# Patient Record
Sex: FEMALE | Race: WHITE | NOT HISPANIC OR LATINO | Employment: UNEMPLOYED | URBAN - METROPOLITAN AREA
[De-identification: names, ages, dates, MRNs, and addresses within clinical notes are randomized per-mention and may not be internally consistent; named-entity substitution may affect disease eponyms.]

---

## 2017-01-11 ENCOUNTER — HOSPITAL ENCOUNTER (OUTPATIENT)
Dept: ONCOLOGY | Facility: CLINIC | Age: 52
Discharge: HOME | End: 2017-01-11
Attending: INTERNAL MEDICINE | Admitting: INTERNAL MEDICINE

## 2017-04-12 ENCOUNTER — HOSPITAL ENCOUNTER (OUTPATIENT)
Dept: ONCOLOGY | Facility: CLINIC | Age: 52
Discharge: HOME | End: 2017-04-12
Attending: INTERNAL MEDICINE | Admitting: INTERNAL MEDICINE

## 2017-04-12 LAB
ALBUMIN SERPL-MCNC: 4.1 G/DL (ref 3.5–4.8)
ALBUMIN/GLOB SERPL: 1.4 {RATIO} (ref 1–1.7)
ALP SERPL-CCNC: 51 IU/L (ref 32–91)
ALT SERPL-CCNC: 17 IU/L (ref 14–54)
ANION GAP SERPL CALC-SCNC: 13.8 MMOL/L (ref 10–20)
AST SERPL-CCNC: 20 IU/L (ref 15–41)
BILIRUB SERPL-MCNC: 0.3 MG/DL (ref 0.3–1.2)
BUN SERPL-MCNC: 11 MG/DL (ref 8–20)
BUN/CREAT SERPL: 15.7 (ref 5.4–26.2)
CALCIUM SERPL-MCNC: 9.7 MG/DL (ref 8.9–10.3)
CHLORIDE SERPL-SCNC: 105 MMOL/L (ref 101–111)
CONV CO2: 25 MMOL/L (ref 22–32)
CONV TOTAL PROTEIN: 7 G/DL (ref 6.1–7.9)
CREAT UR-MCNC: 0.7 MG/DL (ref 0.4–1)
GLOBULIN UR ELPH-MCNC: 2.9 G/DL (ref 2.5–3.8)
GLUCOSE SERPL-MCNC: 94 MG/DL (ref 65–99)
POTASSIUM SERPL-SCNC: 3.8 MMOL/L (ref 3.6–5.1)
SODIUM SERPL-SCNC: 140 MMOL/L (ref 136–144)

## 2017-06-16 ENCOUNTER — HOSPITAL ENCOUNTER (OUTPATIENT)
Dept: MAMMOGRAPHY | Facility: HOSPITAL | Age: 52
Discharge: HOME | End: 2017-06-16
Attending: INTERNAL MEDICINE | Admitting: INTERNAL MEDICINE

## 2017-08-23 ENCOUNTER — HOSPITAL ENCOUNTER (OUTPATIENT)
Dept: TELEMETRY | Facility: HOSPITAL | Age: 52
Discharge: HOME | End: 2017-08-23
Attending: INTERNAL MEDICINE | Admitting: INTERNAL MEDICINE

## 2017-08-23 LAB
ALBUMIN SERPL-MCNC: 4.4 G/DL (ref 3.5–4.8)
ALBUMIN/GLOB SERPL: 1.3 {RATIO} (ref 1–1.7)
ALP SERPL-CCNC: 50 IU/L (ref 32–91)
ALT SERPL-CCNC: 16 IU/L (ref 14–54)
ANION GAP SERPL CALC-SCNC: 13.2 MMOL/L (ref 10–20)
AST SERPL-CCNC: 20 IU/L (ref 15–41)
BILIRUB SERPL-MCNC: 0.4 MG/DL (ref 0.3–1.2)
BUN SERPL-MCNC: 16 MG/DL (ref 8–20)
BUN/CREAT SERPL: 22.9 (ref 5.4–26.2)
CALCIUM SERPL-MCNC: 10.3 MG/DL (ref 8.9–10.3)
CHLORIDE SERPL-SCNC: 105 MMOL/L (ref 101–111)
CONV CO2: 27 MMOL/L (ref 22–32)
CONV TOTAL PROTEIN: 7.7 G/DL (ref 6.1–7.9)
CREAT UR-MCNC: 0.7 MG/DL (ref 0.4–1)
FSH SERPL-ACNC: NORMAL MIU/ML
GLOBULIN UR ELPH-MCNC: 3.3 G/DL (ref 2.5–3.8)
GLUCOSE SERPL-MCNC: 95 MG/DL (ref 65–99)
POTASSIUM SERPL-SCNC: 4.2 MMOL/L (ref 3.6–5.1)
SODIUM SERPL-SCNC: 141 MMOL/L (ref 136–144)

## 2017-12-13 ENCOUNTER — HOSPITAL ENCOUNTER (OUTPATIENT)
Dept: ONCOLOGY | Facility: HOSPITAL | Age: 52
Discharge: HOME | End: 2017-12-13
Attending: INTERNAL MEDICINE | Admitting: INTERNAL MEDICINE

## 2017-12-13 ENCOUNTER — HOSPITAL ENCOUNTER (OUTPATIENT)
Dept: ONCOLOGY | Facility: CLINIC | Age: 52
Setting detail: INFUSION SERIES
Discharge: HOME | End: 2017-12-13
Attending: INTERNAL MEDICINE | Admitting: INTERNAL MEDICINE

## 2017-12-13 ENCOUNTER — CLINICAL SUPPORT (OUTPATIENT)
Dept: ONCOLOGY | Facility: HOSPITAL | Age: 52
End: 2017-12-13

## 2017-12-13 LAB
ALBUMIN SERPL-MCNC: 4.5 G/DL (ref 3.5–4.8)
ALBUMIN/GLOB SERPL: 1.3 {RATIO} (ref 1–1.7)
ALP SERPL-CCNC: 45 IU/L (ref 32–91)
ALT SERPL-CCNC: 17 IU/L (ref 14–54)
ANION GAP SERPL CALC-SCNC: 11 MMOL/L (ref 10–20)
AST SERPL-CCNC: 20 IU/L (ref 15–41)
BILIRUB SERPL-MCNC: 0.4 MG/DL (ref 0.3–1.2)
BUN SERPL-MCNC: 12 MG/DL (ref 8–20)
BUN/CREAT SERPL: 17.1 (ref 5.4–26.2)
CALCIUM SERPL-MCNC: 9.9 MG/DL (ref 8.9–10.3)
CHLORIDE SERPL-SCNC: 103 MMOL/L (ref 101–111)
CONV CO2: 26 MMOL/L (ref 22–32)
CONV TOTAL PROTEIN: 7.9 G/DL (ref 6.1–7.9)
CREAT UR-MCNC: 0.7 MG/DL (ref 0.4–1)
FERRITIN SERPL-MCNC: 10 NG/ML (ref 11–307)
GLOBULIN UR ELPH-MCNC: 3.4 G/DL (ref 2.5–3.8)
GLUCOSE SERPL-MCNC: 93 MG/DL (ref 65–99)
IRON SATN MFR SERPL: 16 % (ref 15–50)
IRON SERPL-MCNC: 77 UG/DL (ref 28–170)
POTASSIUM SERPL-SCNC: 4 MMOL/L (ref 3.6–5.1)
SODIUM SERPL-SCNC: 136 MMOL/L (ref 136–144)
TIBC SERPL-MCNC: 486 UG/DL (ref 228–428)

## 2017-12-13 NOTE — PROGRESS NOTES
PATIENTS ONCOLOGY RECORD LOCATED IN [facility]      Subjective     Name:  NAKUL RODARTE     Date:  2017  Address:  [address] [place] [facility]  Home: [phone]  :  1965 AGE:  52 y.o.        RECORDS OBTAINED:  Patients Oncology Record is located in [facility]

## 2018-01-04 ENCOUNTER — HOSPITAL ENCOUNTER (OUTPATIENT)
Dept: ONCOLOGY | Facility: CLINIC | Age: 53
Setting detail: INFUSION SERIES
Discharge: HOME | End: 2018-01-04
Attending: INTERNAL MEDICINE | Admitting: INTERNAL MEDICINE

## 2018-01-04 ENCOUNTER — CLINICAL SUPPORT (OUTPATIENT)
Dept: ONCOLOGY | Facility: HOSPITAL | Age: 53
End: 2018-01-04

## 2018-01-15 ENCOUNTER — CLINICAL SUPPORT (OUTPATIENT)
Dept: ONCOLOGY | Facility: HOSPITAL | Age: 53
End: 2018-01-15

## 2018-01-15 ENCOUNTER — HOSPITAL ENCOUNTER (OUTPATIENT)
Dept: ONCOLOGY | Facility: CLINIC | Age: 53
Setting detail: INFUSION SERIES
Discharge: HOME | End: 2018-01-15
Attending: INTERNAL MEDICINE | Admitting: INTERNAL MEDICINE

## 2018-01-15 NOTE — PROGRESS NOTES
PATIENTS ONCOLOGY RECORD LOCATED IN [facility]      Subjective     Name:  NAKUL RODARTE     Date:  01/15/2018  Address:  [address] [place] [address]  Home: [phone]  :  1965 AGE:  52 y.o.        RECORDS OBTAINED:  Patients Oncology Record is located in [facility]

## 2018-01-18 ENCOUNTER — OFFICE (OUTPATIENT)
Dept: URBAN - METROPOLITAN AREA CLINIC 64 | Facility: CLINIC | Age: 53
End: 2018-01-18

## 2018-01-18 VITALS
HEART RATE: 99 BPM | SYSTOLIC BLOOD PRESSURE: 139 MMHG | HEIGHT: 65 IN | WEIGHT: 183 LBS | DIASTOLIC BLOOD PRESSURE: 80 MMHG

## 2018-01-18 DIAGNOSIS — D50.0 IRON DEFICIENCY ANEMIA SECONDARY TO BLOOD LOSS (CHRONIC): ICD-10-CM

## 2018-01-18 PROCEDURE — 99213 OFFICE O/P EST LOW 20 MIN: CPT | Performed by: INTERNAL MEDICINE

## 2018-01-24 ENCOUNTER — HOSPITAL ENCOUNTER (OUTPATIENT)
Dept: MAMMOGRAPHY | Facility: HOSPITAL | Age: 53
Discharge: HOME | End: 2018-01-24
Attending: INTERNAL MEDICINE | Admitting: INTERNAL MEDICINE

## 2018-04-11 ENCOUNTER — HOSPITAL ENCOUNTER (OUTPATIENT)
Dept: ONCOLOGY | Facility: HOSPITAL | Age: 53
Discharge: HOME | End: 2018-04-11
Attending: INTERNAL MEDICINE | Admitting: INTERNAL MEDICINE

## 2018-04-11 ENCOUNTER — CLINICAL SUPPORT (OUTPATIENT)
Dept: ONCOLOGY | Facility: HOSPITAL | Age: 53
End: 2018-04-11

## 2018-04-11 ENCOUNTER — HOSPITAL ENCOUNTER (OUTPATIENT)
Dept: ONCOLOGY | Facility: CLINIC | Age: 53
Setting detail: INFUSION SERIES
Discharge: HOME | End: 2018-04-11
Attending: INTERNAL MEDICINE | Admitting: INTERNAL MEDICINE

## 2018-04-11 LAB
ALBUMIN SERPL-MCNC: 4.3 G/DL (ref 3.5–4.8)
ALBUMIN/GLOB SERPL: 1.3 {RATIO} (ref 1–1.7)
ALP SERPL-CCNC: 70 IU/L (ref 32–91)
ALT SERPL-CCNC: 21 IU/L (ref 14–54)
ANION GAP SERPL CALC-SCNC: 13.9 MMOL/L (ref 10–20)
AST SERPL-CCNC: 21 IU/L (ref 15–41)
BILIRUB SERPL-MCNC: 0.2 MG/DL (ref 0.3–1.2)
BUN SERPL-MCNC: 12 MG/DL (ref 8–20)
BUN/CREAT SERPL: 17.1 (ref 5.4–26.2)
CALCIUM SERPL-MCNC: 10 MG/DL (ref 8.9–10.3)
CHLORIDE SERPL-SCNC: 102 MMOL/L (ref 101–111)
CONV CO2: 26 MMOL/L (ref 22–32)
CONV TOTAL PROTEIN: 7.6 G/DL (ref 6.1–7.9)
CREAT UR-MCNC: 0.7 MG/DL (ref 0.4–1)
GLOBULIN UR ELPH-MCNC: 3.3 G/DL (ref 2.5–3.8)
GLUCOSE SERPL-MCNC: 94 MG/DL (ref 65–99)
POTASSIUM SERPL-SCNC: 3.9 MMOL/L (ref 3.6–5.1)
SODIUM SERPL-SCNC: 138 MMOL/L (ref 136–144)

## 2018-04-11 NOTE — PROGRESS NOTES
PATIENTS ONCOLOGY RECORD LOCATED IN [facility]      Subjective     Name:  NAKUL RODARTE     Date:  2018  Address:  [address] [place] [facility]  Home: [phone]  :  1965 AGE:  53 y.o.        RECORDS OBTAINED:  Patients Oncology Record is located in [facility]

## 2018-06-19 ENCOUNTER — HOSPITAL ENCOUNTER (OUTPATIENT)
Dept: MAMMOGRAPHY | Facility: HOSPITAL | Age: 53
Discharge: HOME | End: 2018-06-19
Attending: INTERNAL MEDICINE | Admitting: INTERNAL MEDICINE

## 2018-08-08 ENCOUNTER — HOSPITAL ENCOUNTER (OUTPATIENT)
Dept: ONCOLOGY | Facility: CLINIC | Age: 53
Setting detail: INFUSION SERIES
Discharge: HOME | End: 2018-08-08
Attending: INTERNAL MEDICINE | Admitting: INTERNAL MEDICINE

## 2018-08-08 ENCOUNTER — HOSPITAL ENCOUNTER (OUTPATIENT)
Dept: ONCOLOGY | Facility: HOSPITAL | Age: 53
Discharge: HOME | End: 2018-08-08
Attending: INTERNAL MEDICINE | Admitting: INTERNAL MEDICINE

## 2018-08-08 ENCOUNTER — CLINICAL SUPPORT (OUTPATIENT)
Dept: ONCOLOGY | Facility: HOSPITAL | Age: 53
End: 2018-08-08

## 2018-08-08 LAB
ALBUMIN SERPL-MCNC: 4.2 G/DL (ref 3.5–4.8)
ALBUMIN/GLOB SERPL: 1.2 {RATIO} (ref 1–1.7)
ALP SERPL-CCNC: 83 IU/L (ref 32–91)
ALT SERPL-CCNC: 23 IU/L (ref 14–54)
ANION GAP SERPL CALC-SCNC: 12 MMOL/L (ref 10–20)
AST SERPL-CCNC: 18 IU/L (ref 15–41)
BILIRUB SERPL-MCNC: 0.5 MG/DL (ref 0.3–1.2)
BUN SERPL-MCNC: 13 MG/DL (ref 8–20)
BUN/CREAT SERPL: 21.7 (ref 5.4–26.2)
CALCIUM SERPL-MCNC: 10.3 MG/DL (ref 8.9–10.3)
CHLORIDE SERPL-SCNC: 103 MMOL/L (ref 101–111)
CONV CO2: 27 MMOL/L (ref 22–32)
CONV TOTAL PROTEIN: 7.7 G/DL (ref 6.1–7.9)
CREAT UR-MCNC: 0.6 MG/DL (ref 0.4–1)
GLOBULIN UR ELPH-MCNC: 3.5 G/DL (ref 2.5–3.8)
GLUCOSE SERPL-MCNC: 92 MG/DL (ref 65–99)
POTASSIUM SERPL-SCNC: 4 MMOL/L (ref 3.6–5.1)
SODIUM SERPL-SCNC: 138 MMOL/L (ref 136–144)

## 2018-08-08 NOTE — PROGRESS NOTES
PATIENTS ONCOLOGY RECORD LOCATED IN [facility]      Subjective     Name:  NAKUL RODARTE     Date:  2018  Address:  [address] [place] [address]  Home: [phone]  :  1965 AGE:  53 y.o.        RECORDS OBTAINED:  Patients Oncology Record is located in [facility]

## 2018-10-09 ENCOUNTER — CONVERSION ENCOUNTER (OUTPATIENT)
Dept: FAMILY MEDICINE CLINIC | Facility: CLINIC | Age: 53
End: 2018-10-09

## 2018-11-05 ENCOUNTER — HOSPITAL ENCOUNTER (OUTPATIENT)
Dept: LAB | Facility: HOSPITAL | Age: 53
Setting detail: SPECIMEN
Discharge: HOME | End: 2018-11-05
Attending: INTERNAL MEDICINE | Admitting: INTERNAL MEDICINE

## 2019-02-05 ENCOUNTER — CLINICAL SUPPORT (OUTPATIENT)
Dept: ONCOLOGY | Facility: HOSPITAL | Age: 54
End: 2019-02-05

## 2019-02-05 ENCOUNTER — HOSPITAL ENCOUNTER (OUTPATIENT)
Dept: ONCOLOGY | Facility: HOSPITAL | Age: 54
Discharge: HOME | End: 2019-02-05
Attending: INTERNAL MEDICINE | Admitting: INTERNAL MEDICINE

## 2019-02-05 ENCOUNTER — HOSPITAL ENCOUNTER (OUTPATIENT)
Dept: ONCOLOGY | Facility: CLINIC | Age: 54
Setting detail: INFUSION SERIES
Discharge: HOME | End: 2019-02-05
Attending: INTERNAL MEDICINE | Admitting: INTERNAL MEDICINE

## 2019-02-05 LAB
ALBUMIN SERPL-MCNC: 4 G/DL (ref 3.5–4.8)
ALBUMIN/GLOB SERPL: 1.2 {RATIO} (ref 1–1.7)
ALP SERPL-CCNC: 98 IU/L (ref 32–91)
ALT SERPL-CCNC: 19 IU/L (ref 14–54)
ANION GAP SERPL CALC-SCNC: 15.2 MMOL/L (ref 10–20)
AST SERPL-CCNC: 16 IU/L (ref 15–41)
BILIRUB SERPL-MCNC: 0.4 MG/DL (ref 0.3–1.2)
BUN SERPL-MCNC: 13 MG/DL (ref 8–20)
BUN/CREAT SERPL: 21.7 (ref 5.4–26.2)
CALCIUM SERPL-MCNC: 9.8 MG/DL (ref 8.9–10.3)
CHLORIDE SERPL-SCNC: 100 MMOL/L (ref 101–111)
CONV CO2: 25 MMOL/L (ref 22–32)
CONV TOTAL PROTEIN: 7.3 G/DL (ref 6.1–7.9)
CREAT UR-MCNC: 0.6 MG/DL (ref 0.4–1)
GLOBULIN UR ELPH-MCNC: 3.3 G/DL (ref 2.5–3.8)
GLUCOSE SERPL-MCNC: 86 MG/DL (ref 65–99)
POTASSIUM SERPL-SCNC: 4.2 MMOL/L (ref 3.6–5.1)
SODIUM SERPL-SCNC: 136 MMOL/L (ref 136–144)

## 2019-02-05 NOTE — PROGRESS NOTES
PATIENTS ONCOLOGY RECORD LOCATED IN [facility]      Subjective     Name:  NAKUL RODARTE     Date:  2019  Address:  [address] [place] [address]  Home: [unfilled]  :  1965 AGE:  53 y.o.        RECORDS OBTAINED:  Patients Oncology Record is located in [facility]

## 2019-06-14 ENCOUNTER — HOSPITAL ENCOUNTER (OUTPATIENT)
Dept: MAMMOGRAPHY | Facility: HOSPITAL | Age: 54
Discharge: HOME | End: 2019-06-14
Attending: INTERNAL MEDICINE | Admitting: INTERNAL MEDICINE

## 2019-08-06 NOTE — PROGRESS NOTES
Hematology/Oncology Outpatient Follow Up    PATIENT NAME:Lisseth Linder  :1965  MRN: [id number]  PRIMARY CARE PHYSICIAN: Kari Lou APRN  REFERRING PHYSICIAN: Kari Lou APRN    Chief Complaint   Patient presents with   • Follow-up     Breast cancer   • Follow-up     Iron deficiency   • Follow-up     Monitor drug toxicity        HISTORY OF PRESENT ILLNESS:   This is a 53-year-old female who was originally seen on 10/6/14.  • Patient’s original screening mammogram, not available.   • 14, she underwent needle localized excisional biopsy of the left breast mass and sentinel lymph node biopsy.  Tumor measured 1.1 cm, negative surgical margins.  ER positive, GA positive, HER-2/jamir negative.  • 14 - She had Oncotype DX assay performed on the tumor, which returned with a recurrent score of 10, which corresponds to 7% risk of distant recurrence following five years of Tamoxifen over a course of 10 years.  This places patient at low-risk disease.  ER was positive, GA positive and HER-2/jamir was negative on the validation assay.    • 14 - Patient completed accelerated breast radiation under the care of Dr. Pickett.  • 14 - Patient had a comprehensive genetic analysis with [facility].  This returned with no significant mutation identified, but she was found to have variant of unknown significance in the APC and BARD-1 genes.      • 14 - Patient initiated on Tamoxifen 20 mg daily.   • 6/8/15 - Bilateral digital diagnostic mammogram which showed fatty breasts and postsurgical changes on the left breast, otherwise negative.  Follow up in one year was recommended.  She also had an ultrasound of the left breast which did not show any abnormalities.   • 2/10/16 - EGD and colonoscopy were done.  On the EGD she was found to have erosive gastritis, Manuel erosions and a duodenal ulcer.  Colonoscopy showed tubular adenoma.   • 16 - Follow up mammogram was essentially normal with stable  post-op changes in the left breast.  Follow up in one year was recommended.    • 12/28/16 - Anemia work up revealed reticulocyte count (N), ferritin (L) 4, folate > 24, haptoglobin (N) 145, , iron binding capacity (H) 555.  SPEP with immunofixation assay did not reveal any monoclonal protein.  B12 (N) 353.  Methylmalonic acid level (N) 244.    • 1/11/16 - Urinalysis done showed small blood.  Urine culture did not grow any significant organisms.     • 6/16/17 - Bilateral mammogram with tomosynthesis was a stable exam without any abnormalities detected.    • 8/23/17 - Chemistry panel:  BUN 13, creatinine 0.7, liver function tests (N).  Estradiol level < 20.  FHS 31.5, also in the postmenopausal range.    • December 2017 - Patient received Injectafer for iron deficiency anemia.  Received 750 mg Injectafer day 1 and day 8.  • December 2017 - Ferritin 10.    • 12/13/17 - Tamoxifen discontinued.  Arimidex 1 mg daily and Os-Brenden D were initiated.    • 1/24/18 - DEXA scan, normal.    • 6/19/18 - Patient had bilateral diagnostic mammogram with tomosynthesis.  No mammographic evidence of malignancy.  Follow up in one year was recommended.    • 8/8/18 - Ferritin level was 227.  BUN 13.  Creatinine 0.6.   • 6/14/2019 patient had bilateral diagnostic mammogram with tomosynthesis there is no mammographic evidence of malignancy follow-up in 1 year was recommended.      Past Medical History:   Diagnosis Date   • Breast cancer (CMS/HCC)    • Hypothyroidism    • Thyroid nodule        Past Surgical History:   Procedure Laterality Date   • BREAST LUMPECTOMY     • BREAST RECONSTRUCTION     • SENTINEL LYMPH NODE BIOPSY           Current Outpatient Medications:   •  albuterol sulfate  (90 Base) MCG/ACT inhaler, Inhale 2 puffs., Disp: , Rfl:   •  anastrozole (ARIMIDEX) 1 MG tablet, Daily., Disp: , Rfl:   •  Calcium Carbonate-Vitamin D (CALCIUM 600+D) 600-200 MG-UNIT tablet, Daily., Disp: , Rfl:   •  Multiple Vitamins-Minerals  (MULTIVITAMIN ADULT PO), Take  by mouth., Disp: , Rfl:     No Known Allergies    Family History   Problem Relation Age of Onset   • Kidney cancer Father    • Prostate cancer Father    • Breast cancer Maternal Aunt    • Prostate cancer Paternal Aunt    • Lung cancer Paternal Uncle        Cancer-related family history includes Breast cancer in her maternal aunt; Kidney cancer in her father; Lung cancer in her paternal uncle; Prostate cancer in her father and paternal aunt.    Social History     Tobacco Use   • Smoking status: Never Smoker   • Smokeless tobacco: Never Used   Substance Use Topics   • Alcohol use: No     Frequency: Never   • Drug use: No       I have reviewed the history of present illness, past medical history, family history, social history, lab results, all notes and other records since the patient was last seen on Visit date not found.    SUBJECTIVE:  The patient is here for a follow up appointment.  The patient states that she has been having issues with falling asleep.  The patient continues Arimidex and Calcium twice a day without any issues.  Performs monthly breast self-exam and denies any lumps nipple discharge or skin discoloration.          REVIEW OF SYSTEMS:  Review of Systems   Constitutional: Negative for chills and fever.   HENT: Negative for ear pain, mouth sores, nosebleeds and sore throat.    Eyes: Negative for photophobia and visual disturbance.   Respiratory: Negative for wheezing and stridor.    Cardiovascular: Negative for chest pain and palpitations.   Gastrointestinal: Negative for abdominal pain, diarrhea, nausea and vomiting.   Endocrine: Negative for cold intolerance and heat intolerance.   Genitourinary: Negative for dysuria and hematuria.   Musculoskeletal: Negative for joint swelling and neck stiffness.   Skin: Negative for color change and rash.   Neurological: Negative for seizures and syncope.   Hematological: Negative for adenopathy.        No obvious bleeding  "  Psychiatric/Behavioral: Negative for agitation, confusion and hallucinations.       OBJECTIVE:    Vitals:    08/07/19 1022   BP: 129/78   Pulse: 93   Resp: 16   Temp: 98.1 °F (36.7 °C)   TempSrc: Oral   Weight: 81.6 kg (180 lb)   Height: 165.1 cm (65\")   PainSc: 0-No pain       ECOG  (0) Fully active, able to carry on all predisease performance without restriction    Physical Exam   Constitutional: She is oriented to person, place, and time. No distress.   HENT:   Head: Normocephalic and atraumatic.   Eyes: Conjunctivae and EOM are normal. Right eye exhibits no discharge. Left eye exhibits no discharge. No scleral icterus.   Neck: Normal range of motion. Neck supple. No thyromegaly present.   Cardiovascular: Normal rate, regular rhythm and normal heart sounds. Exam reveals no gallop and no friction rub.   Pulmonary/Chest: Effort normal. No stridor. No respiratory distress. She has no wheezes. Right breast exhibits tenderness. Right breast exhibits no inverted nipple, no mass, no nipple discharge and no skin change. Left breast exhibits no inverted nipple, no mass, no nipple discharge, no skin change and no tenderness.   Abdominal: Soft. Bowel sounds are normal. She exhibits no mass. There is no tenderness. There is no rebound and no guarding.   Musculoskeletal: Normal range of motion. She exhibits no tenderness.   Lymphadenopathy:     She has no cervical adenopathy.   Neurological: She is alert and oriented to person, place, and time. She exhibits normal muscle tone.   Skin: Skin is warm. No rash noted. She is not diaphoretic. No erythema.   Psychiatric: She has a normal mood and affect. Her behavior is normal.   Nursing note and vitals reviewed.      RECENT LABS  WBC   Date Value Ref Range Status   10/16/2018 8.4 THOUSAND/UL 3.8 - 10.8 K/uL Final     RBC   Date Value Ref Range Status   10/16/2018 3.79 MILLION/UL (L) 3.80 - 5.10 10*6/mm3 Final     Hemoglobin   Date Value Ref Range Status   10/16/2018 11.4 (L) 11.7 " - 15.5 g/dL Final     Hematocrit   Date Value Ref Range Status   10/16/2018 33.5 (L) 35.0 - 45.0 % Final     MCV   Date Value Ref Range Status   10/16/2018 88.4 80.0 - 100.0 fL Final     MCH   Date Value Ref Range Status   10/16/2018 30.1 27.0 - 33.0 pg Final     MCHC   Date Value Ref Range Status   10/16/2018 34.0 G/DL 32.0 - 36.0 % Final     RDW   Date Value Ref Range Status   10/16/2018 12.4 11.0 - 15.0 % Final     MPV   Date Value Ref Range Status   10/16/2018 10.3 7.5 - 12.5 fL Final     Platelets   Date Value Ref Range Status   10/16/2018 393 THOUSAND/ - 400 10*3/mm3 Final     Lymphocyte Rel %   Date Value Ref Range Status   10/16/2018 26.0 % Final     Monocyte Rel %   Date Value Ref Range Status   10/16/2018 7.7 % Final     Eosinophil Rel %   Date Value Ref Range Status   10/16/2018 3.2 % Final     Basophil Rel %   Date Value Ref Range Status   10/16/2018 0.5 % Final     Neutrophils Absolute   Date Value Ref Range Status   10/16/2018 5258 CELLS/UL 1500 - 7800 10*3/mm3 Final     Lymphocytes Absolute   Date Value Ref Range Status   10/16/2018 2184 CELLS/ - 3900 10*3/mm3 Final     Monocytes Absolute   Date Value Ref Range Status   10/16/2018 647 CELLS/ - 950 10*3/microliter Final     Eosinophils Absolute   Date Value Ref Range Status   10/16/2018 269 CELLS/UL 15 - 500 10*3/mm3 Final     Basophils Absolute   Date Value Ref Range Status   10/16/2018 42 CELLS/UL 0 - 200 10*3/mm3 Final       Lab Results   Component Value Date    GLUCOSE 86 02/05/2019    BUN 13 02/05/2019    CREATININE 0.6 02/05/2019    EGFRIFNONA 112 10/16/2018    EGFRIFAFRI 97 10/16/2018    BCR 21.7 02/05/2019    K 4.2 02/05/2019    CO2 25 02/05/2019    CALCIUM 9.8 02/05/2019    ALBUMIN 4.0 02/05/2019    LABIL2 1.2 02/05/2019    AST 16 02/05/2019    ALT 19 02/05/2019         Assessment/Plan     There are no diagnoses linked to this encounter.    ASSESSMENT:  1. Invasive ductal carcinoma involving the left breast, status post left  excisional biopsy with sentinel lymph node biopsy, ER positive, RI positive, HER-2/jamir negative for T1c N0 M0.    2. She has completed accelerated partial breast radiation under the care of Dr. Pickett.  3. Negative surgical margin of excision.   4. Low recurrence score on Oncotype DX assay.     5. Family history of three relatives with breast cancer including patient, worrisome for hereditary breast cancer syndrome.   6. Negative for deleterious mutation, but patient was found to have variant of unknown significance in the APC and BARD-1 genes.  7. Status post EGD which showed erosive gastritis, Manuel erosions and duodenal ulcer in February 2016.  Status post colonoscopy which showed tubular adenoma in February 2016.  8. Iron deficiency anemia status post EGD and colonoscopy in 2016.    9. Long history of hematuria.   10. Status post Tamoxifen 12/22/14 to 12/13/17.  Arimidex initiated 12/13/17.    11. Status post Injectafer with response.   12. Lesion on the right lateral aspect of her nose bridge, followed by Dermatology and benign per patient.    PLAN:     I will continue with Arimidex and Os-Brenden D.  She is currently off oral iron supplementation.  I will plan to see her back in six months, but she is due for bilateral diagnostic mammogram in June 2020.  She will continue monthly breast self exam and call for any changes.  Patient would be due for a follow-up bone density in February 2020.  Will go ahead and place the order.  In the meantime she will continue on the above recommendations and I will see her back in six months.   Continue monthly breast self-exam and call for lumps, nipple discharge, skin discoloration or breast pain.  Now she will continue Arimidex and Os-Brenden D.  CBC and CMP were ordered today       I have reviewed labs results, imaging, vitals, and medications with the patient today. Will follow up in 6 months with me.         Patient verbalized understanding and is in agreement of the above  plan.    Part of this document was scribed by Jazlyn Caal RN, BSN.        Much of the above report is an electronic transcription/translation of the spoken language to printed text using Dragon Software. As such, the subtleties and finesse of the spoken language may permit erroneous, or at times, nonsensical words or phrases to be inadvertently transcribed; thus changes may be made at a later date to rectify these errors.

## 2019-08-07 ENCOUNTER — OFFICE VISIT (OUTPATIENT)
Dept: ONCOLOGY | Facility: CLINIC | Age: 54
End: 2019-08-07

## 2019-08-07 VITALS
HEART RATE: 93 BPM | RESPIRATION RATE: 16 BRPM | SYSTOLIC BLOOD PRESSURE: 129 MMHG | BODY MASS INDEX: 29.99 KG/M2 | WEIGHT: 180 LBS | TEMPERATURE: 98.1 F | HEIGHT: 65 IN | DIASTOLIC BLOOD PRESSURE: 78 MMHG

## 2019-08-07 DIAGNOSIS — Z17.0 MALIGNANT NEOPLASM OF LEFT BREAST IN FEMALE, ESTROGEN RECEPTOR POSITIVE, UNSPECIFIED SITE OF BREAST (HCC): Primary | ICD-10-CM

## 2019-08-07 DIAGNOSIS — C50.912 MALIGNANT NEOPLASM OF LEFT BREAST IN FEMALE, ESTROGEN RECEPTOR POSITIVE, UNSPECIFIED SITE OF BREAST (HCC): Primary | ICD-10-CM

## 2019-08-07 DIAGNOSIS — Z78.0 POSTMENOPAUSAL: ICD-10-CM

## 2019-08-07 PROCEDURE — 99215 OFFICE O/P EST HI 40 MIN: CPT | Performed by: INTERNAL MEDICINE

## 2019-08-07 RX ORDER — ANASTROZOLE 1 MG/1
TABLET ORAL EVERY 24 HOURS
COMMUNITY
Start: 2018-10-16 | End: 2019-12-13 | Stop reason: SDUPTHER

## 2019-08-07 RX ORDER — ALBUTEROL SULFATE 90 UG/1
2 AEROSOL, METERED RESPIRATORY (INHALATION)
COMMUNITY
Start: 2014-06-06

## 2019-10-21 PROBLEM — E05.90 HYPERTHYROIDISM: Status: ACTIVE | Noted: 2018-11-05

## 2019-10-21 PROBLEM — R79.89 ABNORMAL SERUM THYROID STIMULATING HORMONE (TSH) LEVEL: Status: ACTIVE | Noted: 2018-10-17

## 2019-10-21 PROBLEM — F41.9 ANXIETY: Status: ACTIVE | Noted: 2019-01-16

## 2019-10-21 PROBLEM — J45.909 ASTHMA: Status: ACTIVE | Noted: 2019-10-21

## 2019-10-21 PROBLEM — E04.1 THYROID NODULE: Status: ACTIVE | Noted: 2018-11-16

## 2019-10-21 PROBLEM — R03.0 ELEVATED BLOOD-PRESSURE READING WITHOUT DIAGNOSIS OF HYPERTENSION: Status: ACTIVE | Noted: 2017-10-31

## 2019-10-21 RX ORDER — FERROUS SULFATE 325(65) MG
325 TABLET ORAL
COMMUNITY
End: 2019-10-22

## 2019-10-21 RX ORDER — DOCUSATE SODIUM 100 MG/1
2 CAPSULE, LIQUID FILLED ORAL
COMMUNITY
Start: 2013-06-01 | End: 2019-10-22

## 2019-10-21 RX ORDER — FLUTICASONE PROPIONATE 50 MCG
1 SPRAY, SUSPENSION (ML) NASAL
COMMUNITY
End: 2019-10-22

## 2019-10-21 RX ORDER — LORATADINE 10 MG/1
TABLET ORAL
COMMUNITY
Start: 2018-03-09 | End: 2022-09-13 | Stop reason: ALTCHOICE

## 2019-10-21 RX ORDER — PANTOPRAZOLE SODIUM 40 MG/1
TABLET, DELAYED RELEASE ORAL
COMMUNITY
Start: 2018-10-30 | End: 2019-10-22

## 2019-10-22 ENCOUNTER — OFFICE VISIT (OUTPATIENT)
Dept: FAMILY MEDICINE CLINIC | Facility: CLINIC | Age: 54
End: 2019-10-22

## 2019-10-22 VITALS
RESPIRATION RATE: 18 BRPM | SYSTOLIC BLOOD PRESSURE: 128 MMHG | HEIGHT: 65 IN | DIASTOLIC BLOOD PRESSURE: 79 MMHG | OXYGEN SATURATION: 100 % | HEART RATE: 109 BPM | TEMPERATURE: 98.2 F | BODY MASS INDEX: 31.59 KG/M2 | WEIGHT: 189.6 LBS

## 2019-10-22 DIAGNOSIS — J30.1 SEASONAL ALLERGIC RHINITIS DUE TO POLLEN: ICD-10-CM

## 2019-10-22 DIAGNOSIS — E05.90 HYPERTHYROIDISM: ICD-10-CM

## 2019-10-22 DIAGNOSIS — E04.1 THYROID NODULE: ICD-10-CM

## 2019-10-22 DIAGNOSIS — R31.1 BENIGN ESSENTIAL MICROSCOPIC HEMATURIA: ICD-10-CM

## 2019-10-22 DIAGNOSIS — K80.20 CALCULUS OF GALLBLADDER WITHOUT CHOLECYSTITIS WITHOUT OBSTRUCTION: ICD-10-CM

## 2019-10-22 DIAGNOSIS — Z12.11 SCREENING FOR COLON CANCER: ICD-10-CM

## 2019-10-22 DIAGNOSIS — Z12.39 SCREENING FOR BREAST CANCER: ICD-10-CM

## 2019-10-22 DIAGNOSIS — Z12.4 CERVICAL CANCER SCREENING: ICD-10-CM

## 2019-10-22 DIAGNOSIS — Z23 FLU VACCINE NEED: ICD-10-CM

## 2019-10-22 DIAGNOSIS — J45.20 MILD INTERMITTENT ASTHMA WITHOUT COMPLICATION: ICD-10-CM

## 2019-10-22 DIAGNOSIS — Z00.00 PREVENTATIVE HEALTH CARE: Primary | ICD-10-CM

## 2019-10-22 DIAGNOSIS — Z85.3 HISTORY OF BREAST CANCER: ICD-10-CM

## 2019-10-22 DIAGNOSIS — D50.9 IRON DEFICIENCY ANEMIA, UNSPECIFIED IRON DEFICIENCY ANEMIA TYPE: ICD-10-CM

## 2019-10-22 DIAGNOSIS — F41.9 ANXIETY: ICD-10-CM

## 2019-10-22 DIAGNOSIS — F51.04 CHRONIC INSOMNIA: ICD-10-CM

## 2019-10-22 PROBLEM — R03.0 ELEVATED BLOOD-PRESSURE READING WITHOUT DIAGNOSIS OF HYPERTENSION: Status: RESOLVED | Noted: 2017-10-31 | Resolved: 2019-10-22

## 2019-10-22 LAB
BILIRUB BLD-MCNC: NEGATIVE MG/DL
CLARITY, POC: CLEAR
COLOR UR: YELLOW
GLUCOSE UR STRIP-MCNC: NEGATIVE MG/DL
KETONES UR QL: ABNORMAL
LEUKOCYTE EST, POC: ABNORMAL
NITRITE UR-MCNC: NEGATIVE MG/ML
PH UR: 5.5 [PH] (ref 5–8)
PROT UR STRIP-MCNC: ABNORMAL MG/DL
RBC # UR STRIP: ABNORMAL /UL
SP GR UR: 1.03 (ref 1–1.03)
UROBILINOGEN UR QL: NORMAL

## 2019-10-22 PROCEDURE — 81003 URINALYSIS AUTO W/O SCOPE: CPT | Performed by: NURSE PRACTITIONER

## 2019-10-22 PROCEDURE — 90471 IMMUNIZATION ADMIN: CPT | Performed by: NURSE PRACTITIONER

## 2019-10-22 PROCEDURE — 99396 PREV VISIT EST AGE 40-64: CPT | Performed by: NURSE PRACTITIONER

## 2019-10-22 PROCEDURE — 90686 IIV4 VACC NO PRSV 0.5 ML IM: CPT | Performed by: NURSE PRACTITIONER

## 2019-10-22 NOTE — PROGRESS NOTES
Chief Complaint   Patient presents with   • Annual Exam      Subjective     Lisseth Linder  has a past medical history of Adenomatous colon polyp, Allergic rhinitis, Asthma, Breast cancer (CMS/HCC), Cholelithiasis, Constipation, Duodenal ulcer, Erosive gastritis, Hiatal hernia, History of breast cancer, Hypothyroidism, Iron deficiency anemia, Microscopic hematuria, SELMA (stress urinary incontinence, female), and Thyroid nodule.    Gynecologic Exam   The patient's pertinent negatives include no pelvic pain or vaginal discharge. The patient is experiencing no pain. Pertinent negatives include no abdominal pain, back pain, chills, constipation, diarrhea, dysuria, fever, flank pain, frequency, hematuria, nausea, rash, sore throat, urgency or vomiting. She is postmenopausal.     PHQ-2 Depression Screening  Little interest or pleasure in doing things? 0   Feeling down, depressed, or hopeless? 0   PHQ-2 Total Score 0     No Known Allergies      Current Outpatient Medications:   •  albuterol sulfate  (90 Base) MCG/ACT inhaler, Inhale 2 puffs., Disp: , Rfl:   •  anastrozole (ARIMIDEX) 1 MG tablet, Daily., Disp: , Rfl:   •  Calcium Carbonate-Vitamin D (CALCIUM 600+D) 600-200 MG-UNIT tablet, 2 (Two) Times a Day., Disp: , Rfl:   •  loratadine (CLARITIN) 10 MG tablet, CLARITIN 10 MG TABS, Disp: , Rfl:   •  Multiple Vitamin (MULTI-VITAMIN) tablet, MULTI-VITAMIN TABS, Disp: , Rfl:     Past Medical History:   Diagnosis Date   • Adenomatous colon polyp    • Allergic rhinitis    • Asthma    • Breast cancer (CMS/HCC)    • Cholelithiasis    • Constipation    • Duodenal ulcer    • Erosive gastritis    • Hiatal hernia    • History of breast cancer    • Hypothyroidism    • Iron deficiency anemia    • Microscopic hematuria    • SELMA (stress urinary incontinence, female)    • Thyroid nodule      Past Surgical History:   Procedure Laterality Date   • BREAST LUMPECTOMY     • BREAST RECONSTRUCTION     • ENDOSCOPY     • SENTINEL LYMPH  NODE BIOPSY       Social History     Socioeconomic History   • Marital status:      Spouse name: Not on file   • Number of children: Not on file   • Years of education: Not on file   • Highest education level: Not on file   Tobacco Use   • Smoking status: Never Smoker   • Smokeless tobacco: Never Used   Substance and Sexual Activity   • Alcohol use: No     Frequency: Never   • Drug use: No   • Sexual activity: Defer     Family History   Problem Relation Age of Onset   • Kidney cancer Father    • Prostate cancer Father    • Breast cancer Maternal Aunt    • Prostate cancer Paternal Aunt    • Lung cancer Paternal Uncle    • Diabetes Mother    • Hypertension Mother    • Hyperlipidemia Mother    • Heart disease Other         Grandparents   • Breast cancer Cousin      Family history, surgical history, past medical history, Allergies and med's reviewed with patient today and updated in [facility] EMR.     ROS:  Review of Systems   Constitutional: Negative for appetite change, chills, diaphoresis, fatigue, fever, unexpected weight gain and unexpected weight loss.   HENT: Negative for congestion, ear discharge, ear pain, hearing loss, mouth sores, nosebleeds, postnasal drip, rhinorrhea, sinus pressure, sneezing, sore throat, swollen glands and trouble swallowing.    Eyes: Negative for blurred vision, double vision, pain, discharge, redness and itching.   Respiratory: Negative for apnea, cough, choking, shortness of breath, wheezing and stridor.    Cardiovascular: Negative for chest pain, palpitations and leg swelling.   Gastrointestinal: Negative for abdominal distention, abdominal pain, anal bleeding, blood in stool, constipation, diarrhea, nausea, rectal pain, vomiting, GERD and indigestion.   Endocrine: Negative for cold intolerance, heat intolerance, polydipsia, polyphagia and polyuria.   Genitourinary: Positive for breast lump. Negative for breast discharge, breast pain, difficulty urinating, dysuria, flank pain,  "frequency, genital sores, hematuria, pelvic pain, urgency, urinary incontinence, vaginal bleeding, vaginal discharge and vaginal pain.        Left side - scar tissue - no change   Musculoskeletal: Negative for arthralgias, back pain, gait problem, joint swelling, myalgias, neck pain and neck stiffness.   Skin: Negative for color change, rash and skin lesions.   Neurological: Negative for dizziness, tremors, seizures, syncope, speech difficulty, weakness, light-headedness, numbness, headache, memory problem and confusion.   Hematological: Negative for adenopathy. Does not bruise/bleed easily.   Psychiatric/Behavioral: Positive for sleep disturbance. Negative for self-injury, suicidal ideas, depressed mood and stress. The patient is not nervous/anxious.         Trouble falling asleep. Tried Tylenol PM - not working. Benadryl helps.      OBJECTIVE:  Vitals:    10/22/19 1300   BP: 128/79   BP Location: Left arm   Patient Position: Sitting   Cuff Size: Large Adult   Pulse: 109   Resp: 18   Temp: 98.2 °F (36.8 °C)   TempSrc: Oral   SpO2: 100%   Weight: 86 kg (189 lb 9.6 oz)   Height: 165.1 cm (65\")     Body mass index is 31.55 kg/m².    Physical Exam   Constitutional: She is oriented to person, place, and time. She appears well-developed and well-nourished. She is active. No distress.   HENT:   Head: Normocephalic and atraumatic.   Right Ear: Tympanic membrane, external ear and ear canal normal. Tympanic membrane is not injected, not erythematous, not retracted and not bulging.   Left Ear: Tympanic membrane, external ear and ear canal normal. Tympanic membrane is not injected, not erythematous, not retracted and not bulging.   Nose: Nose normal. No mucosal edema or rhinorrhea. Right sinus exhibits no maxillary sinus tenderness and no frontal sinus tenderness. Left sinus exhibits no maxillary sinus tenderness and no frontal sinus tenderness.   Mouth/Throat: Uvula is midline, oropharynx is clear and moist and mucous " membranes are normal. No oral lesions. No oropharyngeal exudate, posterior oropharyngeal edema or posterior oropharyngeal erythema.   Eyes: Conjunctivae, EOM and lids are normal. Pupils are equal, round, and reactive to light. Right eye exhibits no discharge. Left eye exhibits no discharge.   Neck: Normal range of motion. Neck supple. No JVD present. Carotid bruit is not present. No tracheal deviation present. No thyroid mass and no thyromegaly present.   Cardiovascular: Normal rate, regular rhythm, normal heart sounds and intact distal pulses. Exam reveals no gallop and no friction rub.   No murmur heard.  Pulmonary/Chest: Effort normal and breath sounds normal. No respiratory distress. She has no wheezes. She has no rales. Right breast exhibits no inverted nipple, no mass, no nipple discharge, no skin change and no tenderness. Left breast exhibits no inverted nipple, no mass, no nipple discharge, no skin change and no tenderness. Breasts are symmetrical.   Abdominal: Soft. Bowel sounds are normal. She exhibits no distension and no mass. There is no hepatosplenomegaly. There is no tenderness. No hernia.   Genitourinary: Rectum normal, vagina normal and uterus normal. Pelvic exam was performed with patient supine. There is no rash, tenderness or lesion on the right labia. There is no rash, tenderness or lesion on the left labia. Uterus is not tender. Cervix exhibits no motion tenderness, no discharge and no friability. Right adnexum displays no mass, no tenderness and no fullness. Left adnexum displays no mass, no tenderness and no fullness. No erythema or tenderness in the vagina. No vaginal discharge found.   Musculoskeletal: Normal range of motion. She exhibits no edema or deformity.   Lymphadenopathy:     She has no cervical adenopathy.     She has no axillary adenopathy. No inguinal adenopathy noted on the right or left side.   Neurological: She is alert and oriented to person, place, and time. She has normal  strength and normal reflexes. No cranial nerve deficit or sensory deficit. Gait normal.   Skin: Skin is warm, dry and intact. No lesion and no rash noted.   Psychiatric: She has a normal mood and affect. Her speech is normal and behavior is normal. Judgment and thought content normal. Cognition and memory are normal.   Vitals reviewed.    Office Visit on 10/22/2019   Component Date Value Ref Range Status   • Color 10/22/2019 Yellow  Yellow, Straw, Dark Yellow, [place]ixa Final   • Clarity, UA 10/22/2019 Clear  Clear Final   • Specific Gravity  10/22/2019 1.030  1.005 - 1.030 Final   • pH, Urine 10/22/2019 5.5  5.0 - 8.0 Final   • Leukocytes 10/22/2019 Trace* Negative Final   • Nitrite, UA 10/22/2019 Negative  Negative Final   • Protein, POC 10/22/2019 2+* Negative mg/dL Final   • Glucose, UA 10/22/2019 Negative  Negative, 1000 mg/dL (3+) mg/dL Final   • Ketones, UA 10/22/2019 Trace* Negative Final   • Urobilinogen, UA 10/22/2019 Normal  Normal Final   • Bilirubin 10/22/2019 Negative  Negative Final   • Blood, UA 10/22/2019 Trace* Negative Final     ASSESSMENT/ PLAN:    Diagnoses and all orders for this visit:    1. Preventative health care (Primary)  Comments:  Discussed age-appropriate health maintenance.   Recommended Shingrix vaccines.  Normal lipids in 2018.  Orders:  -     POC Urinalysis Dipstick, Automated    2. Seasonal allergic rhinitis due to pollen  Comments:  Mild. Stable.     3. Mild intermittent asthma without complication  Comments:  No recent flares. Rare use of rescue inhaler.     4. Calculus of gallbladder without cholecystitis without obstruction  Comments:  Asymptomatic.     5. Hyperthyroidism  Comments:  Followed by Dr. Vital.     6. Thyroid nodule  Comments:  Followed by Dr. Vital.     7. Iron deficiency anemia, unspecified iron deficiency anemia type  Comments:  Followed by Dr. Bautista.     8. History of breast cancer  Comments:  Followed by Dr. Bautista.     9. Anxiety  Comments:  Improved.     10.  Chronic insomnia  Comments:  Discussed sleep hygiene. Recommended Benadryl PRN.     11. Benign essential microscopic hematuria  Comments:  Past eval by Urology.     12. Screening for colon cancer  Comments:  Next colonoscopy due in 2021.     13. Cervical cancer screening  -     Pap IG, HPV-hr    14. Screening for breast cancer  Comments:  Negative mammogram in 6/2019.   Orders:  -     Cancel: Mammo Screening Digital Tomosynthesis Bilateral With CAD; Future    15. Flu vaccine need  -     Fluarix/Fluzone/Afluria Quad>6 Months      Orders Placed Today:     No orders of the defined types were placed in this encounter.     Management Plan:     An After Visit Summary was printed and given to the patient at discharge.    Follow-up: No Follow-up on file.    Kari Lou, LELA 10/22/2019 1:51 PM  This note was electronically signed.

## 2019-10-25 LAB
CYTOLOGIST CVX/VAG CYTO: NORMAL
CYTOLOGY CVX/VAG DOC CYTO: NORMAL
CYTOLOGY CVX/VAG DOC THIN PREP: NORMAL
DX ICD CODE: NORMAL
HIV 1 & 2 AB SER-IMP: NORMAL
HPV I/H RISK 1 DNA CVX QL PROBE+SIG AMP: NEGATIVE
Lab: NORMAL
OTHER STN SPEC: NORMAL
RECOM F/U CVX/VAG CYTO: NORMAL
STAT OF ADQ CVX/VAG CYTO-IMP: NORMAL

## 2019-11-06 ENCOUNTER — OFFICE VISIT (OUTPATIENT)
Dept: FAMILY MEDICINE CLINIC | Facility: CLINIC | Age: 54
End: 2019-11-06

## 2019-11-06 ENCOUNTER — CLINICAL SUPPORT (OUTPATIENT)
Dept: FAMILY MEDICINE CLINIC | Facility: CLINIC | Age: 54
End: 2019-11-06

## 2019-11-06 VITALS
BODY MASS INDEX: 31.56 KG/M2 | SYSTOLIC BLOOD PRESSURE: 134 MMHG | OXYGEN SATURATION: 96 % | DIASTOLIC BLOOD PRESSURE: 76 MMHG | HEIGHT: 65 IN | HEART RATE: 102 BPM | WEIGHT: 189.4 LBS | RESPIRATION RATE: 18 BRPM | TEMPERATURE: 98.1 F

## 2019-11-06 DIAGNOSIS — Z23 NEED FOR SHINGLES VACCINE: Primary | ICD-10-CM

## 2019-11-06 DIAGNOSIS — Z12.4 CERVICAL CANCER SCREENING: Primary | ICD-10-CM

## 2019-11-06 PROCEDURE — 95115 IMMUNOTHERAPY ONE INJECTION: CPT | Performed by: NURSE PRACTITIONER

## 2019-11-06 PROCEDURE — 90750 HZV VACC RECOMBINANT IM: CPT | Performed by: NURSE PRACTITIONER

## 2019-11-06 NOTE — PROGRESS NOTES
Chief Complaint   Patient presents with   • Gynecologic Exam        Subjective     Lisseth VIELKA Linder  has a past medical history of Adenomatous colon polyp, Allergic rhinitis, Asthma, Breast cancer (CMS/HCC), Cholelithiasis, Constipation, Duodenal ulcer, Erosive gastritis, Hiatal hernia, History of breast cancer, Hypothyroidism, Iron deficiency anemia, Microscopic hematuria, SELMA (stress urinary incontinence, female), and Thyroid nodule.    HPI:  Pap Smear Repeat:  10/22/19 Pap Specimen was processed and examined but unsatisfactory for evaluation of epithelial abnormality because of insufficient cellularity. Areas of partially obscuring inflammatory exudate were present. High-risk HPV testing was negative. She is here this morning to repeat her pap smear. She has had no GYN symptoms.     No Known Allergies      Current Outpatient Medications:   •  albuterol sulfate  (90 Base) MCG/ACT inhaler, Inhale 2 puffs., Disp: , Rfl:   •  anastrozole (ARIMIDEX) 1 MG tablet, Daily., Disp: , Rfl:   •  Calcium Carbonate-Vitamin D (CALCIUM 600+D) 600-200 MG-UNIT tablet, 2 (Two) Times a Day., Disp: , Rfl:   •  loratadine (CLARITIN) 10 MG tablet, CLARITIN 10 MG TABS, Disp: , Rfl:   •  Multiple Vitamin (MULTI-VITAMIN) tablet, MULTI-VITAMIN TABS, Disp: , Rfl:     Past Medical History:   Diagnosis Date   • Adenomatous colon polyp    • Allergic rhinitis    • Asthma    • Breast cancer (CMS/HCC)    • Cholelithiasis    • Constipation    • Duodenal ulcer    • Erosive gastritis    • Hiatal hernia    • History of breast cancer    • Hypothyroidism    • Iron deficiency anemia    • Microscopic hematuria    • SELMA (stress urinary incontinence, female)    • Thyroid nodule        Past Surgical History:   Procedure Laterality Date   • BREAST LUMPECTOMY     • BREAST RECONSTRUCTION     • ENDOSCOPY     • SENTINEL LYMPH NODE BIOPSY         Social History     Socioeconomic History   • Marital status:      Spouse name: Not on file   • Number of  "children: Not on file   • Years of education: Not on file   • Highest education level: Not on file   Tobacco Use   • Smoking status: Never Smoker   • Smokeless tobacco: Never Used   Substance and Sexual Activity   • Alcohol use: No     Frequency: Never   • Drug use: No   • Sexual activity: Defer       Family History   Problem Relation Age of Onset   • Kidney cancer Father    • Prostate cancer Father    • Breast cancer Maternal Aunt    • Prostate cancer Paternal Aunt    • Lung cancer Paternal Uncle    • Diabetes Mother    • Hypertension Mother    • Hyperlipidemia Mother    • Heart disease Other         Grandparents   • Breast cancer Cousin        Family history, surgical history, past medical history, Allergies and med's reviewed with patient today and updated in [employer] EMR.     ROS:  Review of Systems   Genitourinary: Negative for pelvic pain, pelvic pressure, vaginal bleeding, vaginal discharge and vaginal pain.       OBJECTIVE:  Vitals:    11/06/19 1032   BP: 134/76   BP Location: Left arm   Patient Position: Sitting   Cuff Size: Large Adult   Pulse: 102   Resp: 18   Temp: 98.1 °F (36.7 °C)   TempSrc: Oral   SpO2: 96%   Weight: 85.9 kg (189 lb 6.4 oz)   Height: 165.1 cm (65\")     Body mass index is 31.52 kg/m².    Physical Exam   Genitourinary: Rectum normal, vagina normal and uterus normal. Pelvic exam was performed with patient supine. There is no rash or tenderness on the right labia. There is no rash or tenderness on the left labia. Cervix exhibits no motion tenderness, no discharge and no friability. Right adnexum displays no mass, no tenderness and no fullness. Left adnexum displays no mass, no tenderness and no fullness.       ASSESSMENT/ PLAN:    Diagnoses and all orders for this visit:    1. Cervical cancer screening (Primary)  -     PAP, Liquid Based ([facility] Only)      Orders Placed Today:     No orders of the defined types were placed in this encounter.       Management Plan:     An After Visit Summary " was printed and given to the patient at discharge.    Follow-up: No Follow-up on file.    Kari Lou, APRN 11/6/2019 12:25 PM  This note was electronically signed.

## 2019-11-09 LAB
CYTOLOGIST CVX/VAG CYTO: NORMAL
CYTOLOGY CVX/VAG DOC CYTO: NORMAL
DX ICD CODE: NORMAL
HIV 1 & 2 AB SER-IMP: NORMAL
OTHER STN SPEC: NORMAL
STAT OF ADQ CVX/VAG CYTO-IMP: NORMAL

## 2019-11-11 ENCOUNTER — TELEPHONE (OUTPATIENT)
Dept: FAMILY MEDICINE CLINIC | Facility: CLINIC | Age: 54
End: 2019-11-11

## 2019-11-11 NOTE — TELEPHONE ENCOUNTER
Patient says that she can not come in at all this week. She said that you told her that if her pap came back showing inflammation that you would just send something in for her. She is wanting to know if you can just do this. Please advise.

## 2019-11-12 ENCOUNTER — OFFICE VISIT (OUTPATIENT)
Dept: FAMILY MEDICINE CLINIC | Facility: CLINIC | Age: 54
End: 2019-11-12

## 2019-11-12 VITALS
TEMPERATURE: 98 F | DIASTOLIC BLOOD PRESSURE: 78 MMHG | SYSTOLIC BLOOD PRESSURE: 157 MMHG | HEIGHT: 65 IN | OXYGEN SATURATION: 96 % | HEART RATE: 93 BPM | RESPIRATION RATE: 16 BRPM | BODY MASS INDEX: 31.49 KG/M2 | WEIGHT: 189 LBS

## 2019-11-12 DIAGNOSIS — N72 CERVICITIS: Primary | ICD-10-CM

## 2019-11-12 PROCEDURE — 99212 OFFICE O/P EST SF 10 MIN: CPT | Performed by: NURSE PRACTITIONER

## 2019-11-12 NOTE — TELEPHONE ENCOUNTER
I told her we would just treat the inflammation if it came back showing it again. But I need to try to figure out what is causing it. That will require a vaginal swab - then I will know what to use to treat it. It doesn't have to be this week.

## 2019-11-12 NOTE — PATIENT INSTRUCTIONS
Cervicitis              Cervicitis is irritation and swelling of the cervix. The cervix is the lower and narrow end of the uterus. It is the part of the uterus that opens up to the vagina.  What are the causes?  This condition may be caused by:  · An STI (sexually transmitted infection), such as gonorrhea, chlamydia, or genital herpes.  · Objects that are put in the vagina, such as tampons or birth control devices. This usually occurs if an object is left in for too long.  · Chemical irritation or allergic reaction. This may be from vaginal douches, latex condoms, or contraceptive creams.  · An injury to the cervix.  · A bacterial infection.  · Radiation therapy.  What increases the risk?  You are more likely to develop this condition if:  · You have unprotected sex.  · You have sex with many partners.  · You have a new sexual partner.  · You start having sex at an early age.  · You have a history of STIs.  What are the signs or symptoms?  Symptoms of this condition include:  · Gray, white, yellow, or bad-smelling vaginal discharge.  · Pain or itchiness around the vagina.  · Pain during sex.  · Pain in the lower abdomen or lower back, especially during sex.  · Urinating often.  · Pain during urination.  · Abnormal vaginal bleeding, such as bleeding between periods, after sex, or after menopause.  In some cases, there are no symptoms.  How is this diagnosed?  This condition may be diagnosed with:  · A pelvic exam. Your health care provider will examine whether the cervix has an unusual discharge or bleeds easily when touched with a swab.  · A wet prep. This is a test in which vaginal discharge is examined under a microscope to check for signs of infection.  · A swab test of the cervix. For this test, sample cells from the cervix are collected on a swab and examined under a microscope to check for signs of infection.  · Urine tests.  How is this treated?  Treatment for cervicitis depends on what is causing the  condition. Treatment may include:  · Antibiotic medicines. These are used to treat certain infections, including STIs like gonorrhea or chlamydia. If you are taking these medicines to treat an STI, your sexual partner may also need to take these medicines.  · Antiviral medicines. These are used to treat herpes simplex virus. Your sexual partner may also need to take these medicines.  · Stopping use of items that cause irritation, such as tampons, latex condoms, douches, or spermicides.  Follow these instructions at home:  · Take over-the-counter and prescription medicines only as told by your health care provider.  · If you were prescribed an antibiotic, take it as told by your health care provider. Do not stop taking the antibiotic even if you start to feel better.  · Keep all follow-up visits as told by your health care provider. This is important.  Contact a health care provider if:  · Your symptoms come back or get worse after treatment.  · You have a fever.  · You have fatigue.  · You have pain in your abdomen.  · You experience nausea, vomiting, or diarrhea.  · You have back pain.  Get help right away if:  · You have severe abdominal pain that cannot be helped with medicine.  · You cannot urinate.  Summary  · Cervicitis is irritation and swelling of the cervix.  · This condition may be caused by an STI (sexually transmitted infection), an allergic reaction or chemical irritation, radiation therapy, or objects that are put in the vagina, such as tampons or diaphragms.  · Symptoms of this condition can include unusual vaginal discharge, painful urination, irritation or pain around the vagina, bleeding between periods or after sex, and pain during sex.  · You are more likely to develop this condition if you have unprotected sex, have many sexual partners, or have a history of STIs.  · This condition may be treated with antibiotic or antiviral medicines or by stopping use of items that cause irritation.  This  information is not intended to replace advice given to you by your health care provider. Make sure you discuss any questions you have with your health care provider.  Document Released: 12/18/2006 Document Revised: 09/02/2017 Document Reviewed: 09/02/2017  [employer] Patient Education © 2019 [employer]

## 2019-11-12 NOTE — PROGRESS NOTES
Chief Complaint   Patient presents with   • Results     Pap showing inflammation        Subjective     Lissethyoav Linder  has a past medical history of Adenomatous colon polyp, Allergic rhinitis, Asthma, Breast cancer (CMS/HCC), Cholelithiasis, Constipation, Duodenal ulcer, Erosive gastritis, Hiatal hernia, History of breast cancer, Hypothyroidism, Iron deficiency anemia, Microscopic hematuria, SELMA (stress urinary incontinence, female), and Thyroid nodule.    HPI:  Cervicitis  Recent pap smears showing inflammation, though negative for intraepithelial lesion or malignancy and negative for high risk HPV. She denies vaginal discharge or irritation. No pelvic pain. She is in a monogamous relationship and has no concerns about STD's. She does not douche, no tampon or condom use. She had not had intercourse for several days prior to her Pap Tests.     No Known Allergies      Current Outpatient Medications:   •  albuterol sulfate  (90 Base) MCG/ACT inhaler, Inhale 2 puffs., Disp: , Rfl:   •  anastrozole (ARIMIDEX) 1 MG tablet, Daily., Disp: , Rfl:   •  Calcium Carbonate-Vitamin D (CALCIUM 600+D) 600-200 MG-UNIT tablet, 2 (Two) Times a Day., Disp: , Rfl:   •  loratadine (CLARITIN) 10 MG tablet, CLARITIN 10 MG TABS, Disp: , Rfl:   •  Multiple Vitamin (MULTI-VITAMIN) tablet, MULTI-VITAMIN TABS, Disp: , Rfl:     Past Medical History:   Diagnosis Date   • Adenomatous colon polyp    • Allergic rhinitis    • Asthma    • Breast cancer (CMS/HCC)    • Cholelithiasis    • Constipation    • Duodenal ulcer    • Erosive gastritis    • Hiatal hernia    • History of breast cancer    • Hypothyroidism    • Iron deficiency anemia    • Microscopic hematuria    • SELMA (stress urinary incontinence, female)    • Thyroid nodule        Past Surgical History:   Procedure Laterality Date   • BREAST LUMPECTOMY     • BREAST RECONSTRUCTION     • ENDOSCOPY     • SENTINEL LYMPH NODE BIOPSY         Social History     Socioeconomic History   •  "Marital status:      Spouse name: Not on file   • Number of children: Not on file   • Years of education: Not on file   • Highest education level: Not on file   Tobacco Use   • Smoking status: Never Smoker   • Smokeless tobacco: Never Used   Substance and Sexual Activity   • Alcohol use: No     Frequency: Never   • Drug use: No   • Sexual activity: Defer       Family History   Problem Relation Age of Onset   • Kidney cancer Father    • Prostate cancer Father    • Breast cancer Maternal Aunt    • Prostate cancer Paternal Aunt    • Lung cancer Paternal Uncle    • Diabetes Mother    • Hypertension Mother    • Hyperlipidemia Mother    • Heart disease Other         Grandparents   • Breast cancer Cousin        Family history, surgical history, past medical history, Allergies and med's reviewed with patient today and updated in [employer] EMR.     ROS:  Review of Systems   Genitourinary: Negative for dysuria, genital sores, pelvic pain, pelvic pressure, vaginal bleeding, vaginal discharge and vaginal pain.   Musculoskeletal: Negative for back pain.       OBJECTIVE:  Vitals:    11/12/19 1605   BP: 157/78   BP Location: Left arm   Patient Position: Sitting   Cuff Size: Adult   Pulse: 93   Resp: 16   Temp: 98 °F (36.7 °C)   TempSrc: Oral   SpO2: 96%   Weight: 85.7 kg (189 lb)   Height: 165.1 cm (65\")     Body mass index is 31.45 kg/m².    Physical Exam   Genitourinary: Rectum normal, vagina normal and uterus normal. Pelvic exam was performed with patient supine. There is no rash, tenderness, lesion or injury on the right labia. There is no rash, tenderness, lesion or injury on the left labia. Cervix exhibits no motion tenderness, no discharge and no friability. Right adnexum displays no mass, no tenderness and no fullness. Left adnexum displays no mass, no tenderness and no fullness. No erythema or tenderness in the vagina. No vaginal discharge found.   Lymphadenopathy: No inguinal adenopathy noted on the right or left side. "       Office Visit on 11/06/2019   Component Date Value Ref Range Status   • Diagnosis 11/06/2019 Comment   Final    Comment: NEGATIVE FOR INTRAEPITHELIAL LESION OR MALIGNANCY.  CELLULAR CHANGES ASSOCIATED WITH INFLAMMATION ARE PRESENT.     • Specimen adequacy: 11/06/2019 Comment   Final    Comment: Satisfactory for evaluation.  Endocervical and/or squamous metaplastic  cells (endocervical component) are present.  Areas of partially obscuring inflammatory exudate are present.     • Clinician Provided ICD-10: 11/06/2019 Comment   Final    Z12.4   • Performed by: 11/06/2019 Comment   Final    Linda Mckeon, Cytotechnologist ([facility])   • . 11/06/2019 .   Final   • Note: 11/06/2019 Comment   Final    Comment: The Pap smear is a screening test designed to aid in the detection of  premalignant and malignant conditions of the uterine cervix.  It is not a  diagnostic procedure and should not be used as the sole means of detecting  cervical cancer.  Both false-positive and false-negative reports do occur.     Office Visit on 10/22/2019   Component Date Value Ref Range Status   • Color 10/22/2019 Yellow  Yellow, Straw, Dark Yellow, Aixa Final   • Clarity, UA 10/22/2019 Clear  Clear Final   • Specific Gravity  10/22/2019 1.030  1.005 - 1.030 Final   • pH, Urine 10/22/2019 5.5  5.0 - 8.0 Final   • Leukocytes 10/22/2019 Trace* Negative Final   • Nitrite, UA 10/22/2019 Negative  Negative Final   • Protein, POC 10/22/2019 2+* Negative mg/dL Final   • Glucose, UA 10/22/2019 Negative  Negative, 1000 mg/dL (3+) mg/dL Final   • Ketones, UA 10/22/2019 Trace* Negative Final   • Urobilinogen, UA 10/22/2019 Normal  Normal Final   • Bilirubin 10/22/2019 Negative  Negative Final   • Blood, UA 10/22/2019 Trace* Negative Final   • Diagnosis 10/22/2019 Comment   Final    UNSATISFACTORY FOR EVALUATION.   • Recommendation: 10/22/2019 Comment   Final    Suggest follow up as clinically appropriate.   • Specimen adequacy: 10/22/2019 Comment   Final     Comment: Specimen processed and examined but unsatisfactory for evaluation of  epithelial abnormality because of insufficient cellularity.  Areas of partially obscuring inflammatory exudate are present.     • Clinician Provided ICD-10: 10/22/2019 Comment   Final    Z12.4   • Performed by: 10/22/2019 Comment   Final    Leah Hardin, Cytotechnologist ([facility])   • QC reviewed by: 10/22/2019 Comment   Final    Keely Hung, Supervisory Cytotechnologist ([facility])   • . 10/22/2019 .   Final   • Note: 10/22/2019 Comment   Final    Comment: The Pap smear is a screening test designed to aid in the detection of  premalignant and malignant conditions of the uterine cervix.  It is not a  diagnostic procedure and should not be used as the sole means of detecting  cervical cancer.  Both false-positive and false-negative reports do occur.     • Method: 10/22/2019 Comment   Final    Comment: This liquid based ThinPrep(R) pap test was screened with the  use of an image guided system.     • HPV, high-risk 10/22/2019 Negative  Negative Final    Comment: This high-risk HPV test detects thirteen high-risk types  (16/18/31/33/35/39/45/51/52/56/58/59/68) without differentiation.         ASSESSMENT/ PLAN:    Diagnoses and all orders for this visit:    1. Cervicitis (Primary)  Comments:  Will call with test results and further recommendations.   Discussed possible causes w/pt.   Orders:  -     NuSwab VG+ - Swab, Vagina        Orders Placed Today:     No orders of the defined types were placed in this encounter.       Management Plan:     An After Visit Summary was printed and given to the patient at discharge.    Follow-up: No Follow-up on file.    LELA Caldwell 11/12/2019 5:52 PM  This note was electronically signed.

## 2019-11-15 DIAGNOSIS — N72 CERVICITIS: Primary | ICD-10-CM

## 2019-11-15 LAB
A VAGINAE DNA VAG QL NAA+PROBE: NORMAL SCORE
BVAB2 DNA VAG QL NAA+PROBE: NORMAL SCORE
C ALBICANS DNA VAG QL NAA+PROBE: NEGATIVE
C GLABRATA DNA VAG QL NAA+PROBE: NEGATIVE
C TRACH RRNA SPEC QL NAA+PROBE: NEGATIVE
MEGA1 DNA VAG QL NAA+PROBE: NORMAL SCORE
N GONORRHOEA RRNA SPEC QL NAA+PROBE: NEGATIVE
T VAGINALIS RRNA SPEC QL NAA+PROBE: NEGATIVE

## 2019-11-26 ENCOUNTER — TELEPHONE (OUTPATIENT)
Dept: FAMILY MEDICINE CLINIC | Facility: CLINIC | Age: 54
End: 2019-11-26

## 2019-11-26 NOTE — TELEPHONE ENCOUNTER
Antibiotics are no longer recommended for sinus infections, generally. You can work her in to my schedule today if she'd like to be seen.

## 2019-11-26 NOTE — TELEPHONE ENCOUNTER
Patient thinks she has a sinus infection, having drainage and a cough. Says that you checked her ears last time she was in. Wanting to know if you could send something into [facility].

## 2019-12-08 DIAGNOSIS — Z17.0 MALIGNANT NEOPLASM OF BREAST IN FEMALE, ESTROGEN RECEPTOR POSITIVE, UNSPECIFIED LATERALITY, UNSPECIFIED SITE OF BREAST (HCC): Primary | ICD-10-CM

## 2019-12-08 DIAGNOSIS — C50.919 MALIGNANT NEOPLASM OF BREAST IN FEMALE, ESTROGEN RECEPTOR POSITIVE, UNSPECIFIED LATERALITY, UNSPECIFIED SITE OF BREAST (HCC): Primary | ICD-10-CM

## 2019-12-09 PROBLEM — Z17.0 MALIGNANT NEOPLASM OF BREAST IN FEMALE, ESTROGEN RECEPTOR POSITIVE (HCC): Status: ACTIVE | Noted: 2019-12-09

## 2019-12-09 PROBLEM — C50.919 MALIGNANT NEOPLASM OF BREAST IN FEMALE, ESTROGEN RECEPTOR POSITIVE: Status: ACTIVE | Noted: 2019-12-09

## 2019-12-09 RX ORDER — ANASTROZOLE 1 MG/1
1 TABLET ORAL DAILY
Qty: 30 TABLET | Refills: 11 | Status: SHIPPED | OUTPATIENT
Start: 2019-12-09 | End: 2019-12-13 | Stop reason: SDUPTHER

## 2019-12-09 RX ORDER — ANASTROZOLE 1 MG/1
TABLET ORAL
Qty: 90 TABLET | Refills: 4 | OUTPATIENT
Start: 2019-12-09

## 2019-12-13 ENCOUNTER — TELEPHONE (OUTPATIENT)
Dept: ONCOLOGY | Facility: CLINIC | Age: 54
End: 2019-12-13

## 2019-12-13 DIAGNOSIS — C50.919 MALIGNANT NEOPLASM OF BREAST IN FEMALE, ESTROGEN RECEPTOR POSITIVE, UNSPECIFIED LATERALITY, UNSPECIFIED SITE OF BREAST (HCC): ICD-10-CM

## 2019-12-13 DIAGNOSIS — Z17.0 MALIGNANT NEOPLASM OF BREAST IN FEMALE, ESTROGEN RECEPTOR POSITIVE, UNSPECIFIED LATERALITY, UNSPECIFIED SITE OF BREAST (HCC): ICD-10-CM

## 2019-12-13 RX ORDER — ANASTROZOLE 1 MG/1
1 TABLET ORAL DAILY
Qty: 90 TABLET | Refills: 0 | Status: SHIPPED | OUTPATIENT
Start: 2019-12-13 | End: 2020-03-12

## 2020-01-16 ENCOUNTER — CLINICAL SUPPORT (OUTPATIENT)
Dept: FAMILY MEDICINE CLINIC | Facility: CLINIC | Age: 55
End: 2020-01-16

## 2020-01-16 DIAGNOSIS — Z23 NEED FOR VACCINATION: Primary | ICD-10-CM

## 2020-01-16 PROCEDURE — 90471 IMMUNIZATION ADMIN: CPT | Performed by: NURSE PRACTITIONER

## 2020-01-16 PROCEDURE — 90750 HZV VACC RECOMBINANT IM: CPT | Performed by: NURSE PRACTITIONER

## 2020-01-27 ENCOUNTER — TELEPHONE (OUTPATIENT)
Dept: ONCOLOGY | Facility: CLINIC | Age: 55
End: 2020-01-27

## 2020-01-27 NOTE — TELEPHONE ENCOUNTER
Tiana with [facility] saying does not have account number for lab orders. Needs to speak to someone in regards to this [phone] option 7.

## 2020-01-27 NOTE — TELEPHONE ENCOUNTER
Spoke with Tiana from [employer] about account number. She stated it was Dr. Bautista's account number she needed. She spoke with Yaneth our [facility] person, to get the right account number in their system.

## 2020-02-04 ENCOUNTER — HOSPITAL ENCOUNTER (OUTPATIENT)
Dept: BONE DENSITY | Facility: HOSPITAL | Age: 55
Discharge: HOME | End: 2020-02-04
Admitting: INTERNAL MEDICINE

## 2020-02-04 DIAGNOSIS — Z78.0 POSTMENOPAUSAL: ICD-10-CM

## 2020-02-04 PROCEDURE — 77080 DXA BONE DENSITY AXIAL: CPT

## 2020-02-12 ENCOUNTER — OFFICE VISIT (OUTPATIENT)
Dept: ONCOLOGY | Facility: CLINIC | Age: 55
End: 2020-02-12

## 2020-02-12 ENCOUNTER — APPOINTMENT (OUTPATIENT)
Dept: LAB | Facility: HOSPITAL | Age: 55
End: 2020-02-12

## 2020-02-12 VITALS
DIASTOLIC BLOOD PRESSURE: 83 MMHG | RESPIRATION RATE: 18 BRPM | BODY MASS INDEX: 30.62 KG/M2 | TEMPERATURE: 98 F | HEART RATE: 93 BPM | WEIGHT: 183.8 LBS | HEIGHT: 65 IN | SYSTOLIC BLOOD PRESSURE: 149 MMHG

## 2020-02-12 DIAGNOSIS — D50.0 IRON DEFICIENCY ANEMIA DUE TO CHRONIC BLOOD LOSS: Primary | ICD-10-CM

## 2020-02-12 DIAGNOSIS — C50.919 MALIGNANT NEOPLASM OF BREAST IN FEMALE, ESTROGEN RECEPTOR POSITIVE, UNSPECIFIED LATERALITY, UNSPECIFIED SITE OF BREAST (HCC): Primary | ICD-10-CM

## 2020-02-12 DIAGNOSIS — Z17.0 MALIGNANT NEOPLASM OF BREAST IN FEMALE, ESTROGEN RECEPTOR POSITIVE, UNSPECIFIED LATERALITY, UNSPECIFIED SITE OF BREAST (HCC): Primary | ICD-10-CM

## 2020-02-12 DIAGNOSIS — D50.9 IRON DEFICIENCY ANEMIA, UNSPECIFIED IRON DEFICIENCY ANEMIA TYPE: ICD-10-CM

## 2020-02-12 LAB
ALBUMIN SERPL-MCNC: 4.2 G/DL (ref 3.5–5.2)
ALBUMIN/GLOB SERPL: 1.3 G/DL
ALP SERPL-CCNC: 101 U/L (ref 39–117)
ALT SERPL W P-5'-P-CCNC: 13 U/L (ref 1–33)
ANION GAP SERPL CALCULATED.3IONS-SCNC: 12 MMOL/L (ref 5–15)
AST SERPL-CCNC: 14 U/L (ref 1–32)
BASOPHILS # BLD AUTO: 0.04 10*3/MM3 (ref 0–0.2)
BASOPHILS NFR BLD AUTO: 0.4 % (ref 0–1.5)
BILIRUB SERPL-MCNC: 0.2 MG/DL (ref 0.2–1.2)
BUN BLD-MCNC: 15 MG/DL (ref 6–20)
BUN/CREAT SERPL: 22.4 (ref 7–25)
CALCIUM SPEC-SCNC: 9.8 MG/DL (ref 8.6–10.5)
CHLORIDE SERPL-SCNC: 104 MMOL/L (ref 98–107)
CO2 SERPL-SCNC: 25 MMOL/L (ref 22–29)
CREAT BLD-MCNC: 0.67 MG/DL (ref 0.57–1)
DEPRECATED RDW RBC AUTO: 55.9 FL (ref 37–54)
EOSINOPHIL # BLD AUTO: 0.22 10*3/MM3 (ref 0–0.4)
EOSINOPHIL NFR BLD AUTO: 2.4 % (ref 0.3–6.2)
ERYTHROCYTE [DISTWIDTH] IN BLOOD BY AUTOMATED COUNT: 20.9 % (ref 12.3–15.4)
FERRITIN SERPL-MCNC: 4.84 NG/ML (ref 13–150)
GFR SERPL CREATININE-BSD FRML MDRD: 91 ML/MIN/1.73
GLOBULIN UR ELPH-MCNC: 3.2 GM/DL
GLUCOSE BLD-MCNC: 125 MG/DL (ref 65–99)
HCT VFR BLD AUTO: 29.9 % (ref 34–46.6)
HGB BLD-MCNC: 9 G/DL (ref 12–15.9)
IRON 24H UR-MRATE: 13 MCG/DL (ref 37–145)
IRON SATN MFR SERPL: 2 % (ref 20–50)
LDH SERPL-CCNC: 155 U/L (ref 135–214)
LYMPHOCYTES # BLD AUTO: 2.18 10*3/MM3 (ref 0.7–3.1)
LYMPHOCYTES NFR BLD AUTO: 23.4 % (ref 19.6–45.3)
MCH RBC QN AUTO: 22.9 PG (ref 26.6–33)
MCHC RBC AUTO-ENTMCNC: 30.1 G/DL (ref 31.5–35.7)
MCV RBC AUTO: 76.1 FL (ref 79–97)
MONOCYTES # BLD AUTO: 0.76 10*3/MM3 (ref 0.1–0.9)
MONOCYTES NFR BLD AUTO: 8.2 % (ref 5–12)
NEUTROPHILS # BLD AUTO: 6.11 10*3/MM3 (ref 1.7–7)
NEUTROPHILS NFR BLD AUTO: 65.6 % (ref 42.7–76)
PLATELET # BLD AUTO: 442 10*3/MM3 (ref 140–450)
PMV BLD AUTO: 8.4 FL (ref 6–12)
POTASSIUM BLD-SCNC: 3.7 MMOL/L (ref 3.5–5.2)
PROT SERPL-MCNC: 7.4 G/DL (ref 6–8.5)
RBC # BLD AUTO: 3.93 10*6/MM3 (ref 3.77–5.28)
RETICS # AUTO: 0.05 10*6/MM3 (ref 0.02–0.13)
RETICS/RBC NFR AUTO: 1.17 % (ref 0.7–1.9)
SODIUM BLD-SCNC: 141 MMOL/L (ref 136–145)
TIBC SERPL-MCNC: 559 MCG/DL (ref 298–536)
TRANSFERRIN SERPL-MCNC: 375 MG/DL (ref 200–360)
WBC NRBC COR # BLD: 9.31 10*3/MM3 (ref 3.4–10.8)

## 2020-02-12 PROCEDURE — 36415 COLL VENOUS BLD VENIPUNCTURE: CPT | Performed by: INTERNAL MEDICINE

## 2020-02-12 PROCEDURE — 83540 ASSAY OF IRON: CPT | Performed by: NURSE PRACTITIONER

## 2020-02-12 PROCEDURE — 82728 ASSAY OF FERRITIN: CPT | Performed by: NURSE PRACTITIONER

## 2020-02-12 PROCEDURE — 80053 COMPREHEN METABOLIC PANEL: CPT | Performed by: NURSE PRACTITIONER

## 2020-02-12 PROCEDURE — 85025 COMPLETE CBC W/AUTO DIFF WBC: CPT | Performed by: INTERNAL MEDICINE

## 2020-02-12 PROCEDURE — 99214 OFFICE O/P EST MOD 30 MIN: CPT | Performed by: INTERNAL MEDICINE

## 2020-02-12 PROCEDURE — 83615 LACTATE (LD) (LDH) ENZYME: CPT | Performed by: NURSE PRACTITIONER

## 2020-02-12 PROCEDURE — 84466 ASSAY OF TRANSFERRIN: CPT | Performed by: NURSE PRACTITIONER

## 2020-02-12 PROCEDURE — 85045 AUTOMATED RETICULOCYTE COUNT: CPT | Performed by: INTERNAL MEDICINE

## 2020-02-12 RX ORDER — PANTOPRAZOLE SODIUM 40 MG/1
40 TABLET, DELAYED RELEASE ORAL DAILY
Qty: 30 TABLET | Refills: 3 | Status: SHIPPED | OUTPATIENT
Start: 2020-02-12 | End: 2020-03-31 | Stop reason: SDUPTHER

## 2020-02-12 NOTE — PROGRESS NOTES
Hematology/Oncology Outpatient Follow Up    PATIENT NAME:Lisseth Linder  :1965  MRN: [id number]  PRIMARY CARE PHYSICIAN: Kari Lou APRN  REFERRING PHYSICIAN: Kari Lou APRN    Chief Complaint   Patient presents with   • Follow-up     Breast cancer   • Follow-up     Monitor drug toxicity        HISTORY OF PRESENT ILLNESS:   This is a 54-year-old female who was originally seen on 10/6/14.  • Patient’s original screening mammogram, not available.   • 14, she underwent needle localized excisional biopsy of the left breast mass and sentinel lymph node biopsy.  Tumor measured 1.1 cm, negative surgical margins.  ER positive, UT positive, HER-2/jamir negative.  • 14 - She had Oncotype DX assay performed on the tumor, which returned with a recurrent score of 10, which corresponds to 7% risk of distant recurrence following five years of Tamoxifen over a course of 10 years.  This places patient at low-risk disease.  ER was positive, UT positive and HER-2/jamir was negative on the validation assay.    • 14 - Patient completed accelerated breast radiation under the care of Dr. Pickett.  • 14 - Patient had a comprehensive genetic analysis with [facility].  This returned with no significant mutation identified, but she was found to have variant of unknown significance in the APC and BARD-1 genes.      • 14 - Patient initiated on Tamoxifen 20 mg daily.   • 6/8/15 - Bilateral digital diagnostic mammogram which showed fatty breasts and postsurgical changes on the left breast, otherwise negative.  Follow up in one year was recommended.  She also had an ultrasound of the left breast which did not show any abnormalities.   • 2/10/16 - EGD and colonoscopy were done.  On the EGD she was found to have erosive gastritis, Manuel erosions and a duodenal ulcer.  Colonoscopy showed tubular adenoma.   • 16 - Follow up mammogram was essentially normal with stable post-op changes in the left breast.   Follow up in one year was recommended.    • 12/28/16 - Anemia work up revealed reticulocyte count (N), ferritin (L) 4, folate > 24, haptoglobin (N) 145, , iron binding capacity (H) 555.  SPEP with immunofixation assay did not reveal any monoclonal protein.  B12 (N) 353.  Methylmalonic acid level (N) 244.    • 1/11/16 - Urinalysis done showed small blood.  Urine culture did not grow any significant organisms.     • 6/16/17 - Bilateral mammogram with tomosynthesis was a stable exam without any abnormalities detected.    • 8/23/17 - Chemistry panel:  BUN 13, creatinine 0.7, liver function tests (N).  Estradiol level < 20.  FHS 31.5, also in the postmenopausal range.    • December 2017 - Patient received Injectafer for iron deficiency anemia.  Received 750 mg Injectafer day 1 and day 8.  • December 2017 - Ferritin 10.    • 12/13/17 - Tamoxifen discontinued.  Arimidex 1 mg daily and Os-Brenden D were initiated.    • 1/24/18 - DEXA scan, normal.    • 6/19/18 - Patient had bilateral diagnostic mammogram with tomosynthesis.  No mammographic evidence of malignancy.  Follow up in one year was recommended.    • 8/8/18 - Ferritin level was 227.  BUN 13.  Creatinine 0.6.   • 6/14/2019 patient had bilateral diagnostic mammogram with tomosynthesis there is no mammographic evidence of malignancy follow-up in 1 year was recommended.  • 2/4/2020-patient had a DEXA scan which showed a normal bone density      Past Medical History:   Diagnosis Date   • Adenomatous colon polyp    • Allergic rhinitis    • Asthma    • Breast cancer (CMS/HCC)    • Cholelithiasis    • Constipation    • Duodenal ulcer    • Erosive gastritis    • Hiatal hernia    • History of breast cancer    • Hypothyroidism    • Iron deficiency anemia    • Microscopic hematuria    • SELMA (stress urinary incontinence, female)    • Thyroid nodule        Past Surgical History:   Procedure Laterality Date   • BREAST LUMPECTOMY     • BREAST RECONSTRUCTION     • ENDOSCOPY     •  SENTINEL LYMPH NODE BIOPSY           Current Outpatient Medications:   •  albuterol sulfate  (90 Base) MCG/ACT inhaler, Inhale 2 puffs., Disp: , Rfl:   •  anastrozole (ARIMIDEX) 1 MG tablet, Take 1 tablet by mouth Daily., Disp: 90 tablet, Rfl: 0  •  Calcium Carbonate-Vitamin D (CALCIUM 600+D) 600-200 MG-UNIT tablet, 2 (Two) Times a Day., Disp: , Rfl:   •  loratadine (CLARITIN) 10 MG tablet, CLARITIN 10 MG TABS, Disp: , Rfl:   •  Multiple Vitamin (MULTI-VITAMIN) tablet, MULTI-VITAMIN TABS, Disp: , Rfl:     No Known Allergies    Family History   Problem Relation Age of Onset   • Kidney cancer Father    • Prostate cancer Father    • Breast cancer Maternal Aunt    • Prostate cancer Paternal Aunt    • Lung cancer Paternal Uncle    • Diabetes Mother    • Hypertension Mother    • Hyperlipidemia Mother    • Heart disease Other         Grandparents   • Breast cancer Cousin        Cancer-related family history includes Breast cancer in her cousin and maternal aunt; Kidney cancer in her father; Lung cancer in her paternal uncle; Prostate cancer in her father and paternal aunt.    Social History     Tobacco Use   • Smoking status: Never Smoker   • Smokeless tobacco: Never Used   Substance Use Topics   • Alcohol use: No     Frequency: Never   • Drug use: No       I have reviewed the history of present illness, past medical history, family history, social history, lab results, all notes and other records since the patient was last seen on Visit date not found.    SUBJECTIVE:    The patient is here for a follow up appointment.  The patient continues Arimidex and Calcium twice a day without any issues.  Performs monthly breast self-exam and denies any lumps nipple discharge or skin discoloration.          REVIEW OF SYSTEMS:  Review of Systems   Constitutional: Negative for chills and fever.   HENT: Negative for ear pain, mouth sores, nosebleeds and sore throat.    Eyes: Negative for photophobia and visual disturbance.  "  Respiratory: Negative for wheezing and stridor.    Cardiovascular: Negative for chest pain and palpitations.   Gastrointestinal: Negative for abdominal pain, diarrhea, nausea and vomiting.   Endocrine: Negative for cold intolerance and heat intolerance.   Genitourinary: Negative for dysuria and hematuria.   Musculoskeletal: Negative for joint swelling and neck stiffness.   Skin: Negative for color change and rash.   Neurological: Negative for seizures and syncope.   Hematological: Negative for adenopathy.        No obvious bleeding   Psychiatric/Behavioral: Negative for agitation, confusion and hallucinations.       OBJECTIVE:    Vitals:    02/12/20 1226   BP: 149/83   Pulse: 93   Resp: 18   Temp: 98 °F (36.7 °C)   Weight: 83.4 kg (183 lb 12.8 oz)   Height: 165.1 cm (65\")   PainSc: 0-No pain       ECOG  (0) Fully active, able to carry on all predisease performance without restriction    Physical Exam   Constitutional: She is oriented to person, place, and time. No distress.   HENT:   Head: Normocephalic and atraumatic.   Eyes: Conjunctivae and EOM are normal. Right eye exhibits no discharge. Left eye exhibits no discharge. No scleral icterus.   Neck: Normal range of motion. Neck supple. No thyromegaly present.   Cardiovascular: Normal rate, regular rhythm and normal heart sounds. Exam reveals no gallop and no friction rub.   Pulmonary/Chest: Effort normal. No stridor. No respiratory distress. She has no wheezes. Right breast exhibits tenderness. Right breast exhibits no inverted nipple, no mass, no nipple discharge and no skin change. Left breast exhibits no inverted nipple, no mass, no nipple discharge, no skin change and no tenderness. No breast bleeding. Breasts are symmetrical.   Bilateral breast exam was negative  Bilateral axillary exam was negative   Abdominal: Soft. Bowel sounds are normal. She exhibits no mass. There is no tenderness. There is no rebound and no guarding.   Musculoskeletal: Normal range of " motion. She exhibits no tenderness.   Lymphadenopathy:     She has no cervical adenopathy.   Neurological: She is alert and oriented to person, place, and time. She exhibits normal muscle tone.   Skin: Skin is warm. No rash noted. She is not diaphoretic. No erythema.   Psychiatric: She has a normal mood and affect. Her behavior is normal.   Nursing note and vitals reviewed.      RECENT LABS  WBC   Date Value Ref Range Status   02/12/2020 9.31 3.40 - 10.80 10*3/mm3 Final     RBC   Date Value Ref Range Status   02/12/2020 3.93 3.77 - 5.28 10*6/mm3 Final     Hemoglobin   Date Value Ref Range Status   02/12/2020 9.0 (L) 12.0 - 15.9 g/dL Final     Hematocrit   Date Value Ref Range Status   02/12/2020 29.9 (L) 34.0 - 46.6 % Final     MCV   Date Value Ref Range Status   02/12/2020 76.1 (L) 79.0 - 97.0 fL Final     MCH   Date Value Ref Range Status   02/12/2020 22.9 (L) 26.6 - 33.0 pg Final     MCHC   Date Value Ref Range Status   02/12/2020 30.1 (L) 31.5 - 35.7 g/dL Final     RDW   Date Value Ref Range Status   02/12/2020 20.9 (H) 12.3 - 15.4 % Final     RDW-SD   Date Value Ref Range Status   02/12/2020 55.9 (H) 37.0 - 54.0 fl Final     MPV   Date Value Ref Range Status   02/12/2020 8.4 6.0 - 12.0 fL Final     Platelets   Date Value Ref Range Status   02/12/2020 442 140 - 450 10*3/mm3 Final     Neutrophil %   Date Value Ref Range Status   02/12/2020 65.6 42.7 - 76.0 % Final     Lymphocyte %   Date Value Ref Range Status   02/12/2020 23.4 19.6 - 45.3 % Final     Monocyte %   Date Value Ref Range Status   02/12/2020 8.2 5.0 - 12.0 % Final     Eosinophil %   Date Value Ref Range Status   02/12/2020 2.4 0.3 - 6.2 % Final     Basophil %   Date Value Ref Range Status   02/12/2020 0.4 0.0 - 1.5 % Final     Neutrophils, Absolute   Date Value Ref Range Status   02/12/2020 6.11 1.70 - 7.00 10*3/mm3 Final     Lymphocytes, Absolute   Date Value Ref Range Status   02/12/2020 2.18 0.70 - 3.10 10*3/mm3 Final     Monocytes, Absolute    Date Value Ref Range Status   02/12/2020 0.76 0.10 - 0.90 10*3/mm3 Final     Eosinophils, Absolute   Date Value Ref Range Status   02/12/2020 0.22 0.00 - 0.40 10*3/mm3 Final     Basophils, Absolute   Date Value Ref Range Status   02/12/2020 0.04 0.00 - 0.20 10*3/mm3 Final       Lab Results   Component Value Date    GLUCOSE 86 02/05/2019    BUN 13 02/05/2019    CREATININE 0.6 02/05/2019    EGFRIFNONA 112 10/16/2018    EGFRIFAFRI 97 10/16/2018    BCR 21.7 02/05/2019    K 4.2 02/05/2019    CO2 25 02/05/2019    CALCIUM 9.8 02/05/2019    ALBUMIN 4.0 02/05/2019    LABIL2 1.2 02/05/2019    AST 16 02/05/2019    ALT 19 02/05/2019         Assessment/Plan     Malignant neoplasm of breast in female, estrogen receptor positive, unspecified laterality, unspecified site of breast (CMS/HCC)  - CBC & Differential  - Comprehensive Metabolic Panel  - CBC Auto Differential    Iron deficiency anemia, unspecified iron deficiency anemia type  - Iron Profile  - Ferritin  - Vitamin B12  - Folate  - Reticulocytes  - Lactate Dehydrogenase  - Haptoglobin  - Protein Electrophoresis, Total  - Immunofixation, Serum      ASSESSMENT:  1. 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    2. She has completed accelerated partial breast radiation under the care of Dr. Pickett.  3. Negative surgical margin of excision.   4. Low recurrence score on Oncotype DX assay.     5. Family history of three relatives with breast cancer including patient, worrisome for hereditary breast cancer syndrome.   6. Negative for deleterious mutation, but patient was found to have variant of unknown significance in the APC and BARD-1 genes.  7. Status post EGD which showed erosive gastritis, Manuel erosions and duodenal ulcer in February 2016.  Status post colonoscopy which showed tubular adenoma in February 2016.  8. Iron deficiency  anemia status post EGD and colonoscopy in 2016.    9. Long history of hematuria.   10. Status post Tamoxifen 12/22/14 to 12/13/17.  Arimidex initiated 12/13/17.    11. Status post Injectafer with response.   12. Lesion on the right lateral aspect of her nose bridge, followed by Dermatology and benign per patient.    PLANS:    She has recurrent microcytic anemia likely secondary to iron deficiency.  We will obtain a full iron panel.  Would also include B12 and folate as well as reticulocyte count LDH and haptoglobin level.    She will continue with Arimidex and Os-Brenden D.  She is currently off oral iron supplementation.  I will plan to see her back in 6 weeks, but she is due for bilateral diagnostic mammogram in June 2020.  She will continue monthly breast self exam and call for any changes.   Will go ahead and place the order.  In the meantime she will continue on the above recommendations and I will see her back in 6 weeks.   Continue monthly breast self-exam and call for lumps, nipple discharge, skin discoloration or breast pain.  Now she will continue Arimidex and Os-Brenden D.  CBC and CMP were ordered today    Bone density is due 2/4/22       I have reviewed labs results, imaging, vitals, and medications with the patient today. Will follow up in 6 weeks with me.         Patient verbalized understanding and is in agreement of the above plan.    Part of this document was scribed by Jazlyn Caal, RN, BSN.

## 2020-02-12 NOTE — PROGRESS NOTES
Let patient know I will be referring her back to [facility].  I also will be sending in a prescription for Protonix.

## 2020-02-13 LAB
ALBUMIN SERPL-MCNC: 3.6 G/DL (ref 2.9–4.4)
ALBUMIN/GLOB SERPL: 1.1 {RATIO} (ref 0.7–1.7)
ALPHA1 GLOB FLD ELPH-MCNC: 0.2 G/DL (ref 0–0.4)
ALPHA2 GLOB SERPL ELPH-MCNC: 0.8 G/DL (ref 0.4–1)
B-GLOBULIN SERPL ELPH-MCNC: 1.1 G/DL (ref 0.7–1.3)
FOLATE SERPL-MCNC: >20 NG/ML
GAMMA GLOB SERPL ELPH-MCNC: 1.1 G/DL (ref 0.4–1.8)
GLOBULIN SER CALC-MCNC: 3.3 G/DL (ref 2.2–3.9)
HAPTOGLOB SERPL-MCNC: 212 MG/DL (ref 33–346)
IGA SERPL-MCNC: 187 MG/DL (ref 87–352)
IGG SERPL-MCNC: 1105 MG/DL (ref 700–1600)
IGM SERPL-MCNC: 178 MG/DL (ref 26–217)
Lab: NORMAL
M-SPIKE: NORMAL G/DL
PROT PATTERN SERPL IFE-IMP: NORMAL
PROT SERPL-MCNC: 6.9 G/DL (ref 6–8.5)
VIT B12 SERPL-MCNC: 950 PG/ML (ref 232–1245)

## 2020-02-14 ENCOUNTER — TELEPHONE (OUTPATIENT)
Dept: ONCOLOGY | Facility: CLINIC | Age: 55
End: 2020-02-14

## 2020-02-14 DIAGNOSIS — D50.0 IRON DEFICIENCY ANEMIA DUE TO CHRONIC BLOOD LOSS: Primary | ICD-10-CM

## 2020-02-14 NOTE — TELEPHONE ENCOUNTER
The patient's Iron saturation and ferritin were low and Dr. Bautista ordered for the patient to see [facility].  Dr. Bautista also ordered for the patient to receive Injectafer 750mg IV days 1 and 8.  The patient is also needing a urinalysis done the day she comes in for Injectafer.  I called and left a message for the patient to call the office to discuss her results.

## 2020-02-14 NOTE — TELEPHONE ENCOUNTER
I called the patient and she was notified that Injectafer has been ordered and that Dr. Bautista would like for her to follow up with [facility].  She stated that she would call to schedule the appointment with [facility].  She stated that she did receive the refill of her Protonix.  She was also notified that she would need a urinalysis when she comes in for the Injectafer.  The patient voiced understanding.  She was notified that our office would call to schedule the Injectafer once it was approved.

## 2020-03-12 DIAGNOSIS — C50.919 MALIGNANT NEOPLASM OF BREAST IN FEMALE, ESTROGEN RECEPTOR POSITIVE, UNSPECIFIED LATERALITY, UNSPECIFIED SITE OF BREAST (HCC): ICD-10-CM

## 2020-03-12 DIAGNOSIS — Z17.0 MALIGNANT NEOPLASM OF BREAST IN FEMALE, ESTROGEN RECEPTOR POSITIVE, UNSPECIFIED LATERALITY, UNSPECIFIED SITE OF BREAST (HCC): ICD-10-CM

## 2020-03-12 RX ORDER — ANASTROZOLE 1 MG/1
TABLET ORAL
Qty: 30 TABLET | Refills: 3 | Status: SHIPPED | OUTPATIENT
Start: 2020-03-12 | End: 2020-10-27 | Stop reason: SDUPTHER

## 2020-03-20 ENCOUNTER — TELEPHONE (OUTPATIENT)
Dept: ONCOLOGY | Facility: CLINIC | Age: 55
End: 2020-03-20

## 2020-03-20 NOTE — TELEPHONE ENCOUNTER
Patient wants to cancel appointment on 03/25/20 and reschedule      Call patient back at [phone]

## 2020-03-20 NOTE — TELEPHONE ENCOUNTER
Ms best call about reschedule her appointment on 3/25/20 stated that she want to way until thing settle down and she is doing fine also stated that she will call back to make appointment

## 2020-03-25 ENCOUNTER — APPOINTMENT (OUTPATIENT)
Dept: LAB | Facility: HOSPITAL | Age: 55
End: 2020-03-25

## 2020-03-31 DIAGNOSIS — C50.919 MALIGNANT NEOPLASM OF BREAST IN FEMALE, ESTROGEN RECEPTOR POSITIVE, UNSPECIFIED LATERALITY, UNSPECIFIED SITE OF BREAST (HCC): Primary | ICD-10-CM

## 2020-03-31 DIAGNOSIS — Z17.0 MALIGNANT NEOPLASM OF BREAST IN FEMALE, ESTROGEN RECEPTOR POSITIVE, UNSPECIFIED LATERALITY, UNSPECIFIED SITE OF BREAST (HCC): Primary | ICD-10-CM

## 2020-03-31 RX ORDER — ANASTROZOLE 1 MG/1
1 TABLET ORAL DAILY
Qty: 30 TABLET | Refills: 3 | OUTPATIENT
Start: 2020-03-31

## 2020-03-31 RX ORDER — PANTOPRAZOLE SODIUM 40 MG/1
40 TABLET, DELAYED RELEASE ORAL DAILY
Qty: 30 TABLET | Refills: 3 | Status: SHIPPED | OUTPATIENT
Start: 2020-03-31 | End: 2020-04-07

## 2020-03-31 RX ORDER — PANTOPRAZOLE SODIUM 40 MG/1
40 TABLET, DELAYED RELEASE ORAL DAILY
Qty: 30 TABLET | Refills: 3 | OUTPATIENT
Start: 2020-03-31

## 2020-03-31 RX ORDER — ANASTROZOLE 1 MG/1
1 TABLET ORAL DAILY
Qty: 90 TABLET | Refills: 1 | Status: SHIPPED | OUTPATIENT
Start: 2020-03-31 | End: 2020-10-27 | Stop reason: SDUPTHER

## 2020-03-31 NOTE — TELEPHONE ENCOUNTER
Patient needs refills:    Pantoprazole 40 mg  Anastrozole 1 mg tab    Patient has enough for this week.    [facility] to [facility] for [facility] day supply    [phone]  
None

## 2020-04-07 DIAGNOSIS — Z17.0 MALIGNANT NEOPLASM OF BREAST IN FEMALE, ESTROGEN RECEPTOR POSITIVE, UNSPECIFIED LATERALITY, UNSPECIFIED SITE OF BREAST (HCC): ICD-10-CM

## 2020-04-07 DIAGNOSIS — C50.919 MALIGNANT NEOPLASM OF BREAST IN FEMALE, ESTROGEN RECEPTOR POSITIVE, UNSPECIFIED LATERALITY, UNSPECIFIED SITE OF BREAST (HCC): ICD-10-CM

## 2020-04-07 RX ORDER — ANASTROZOLE 1 MG/1
1 TABLET ORAL DAILY
Qty: 30 TABLET | Refills: 3 | OUTPATIENT
Start: 2020-04-07

## 2020-04-07 RX ORDER — PANTOPRAZOLE SODIUM 40 MG/1
40 TABLET, DELAYED RELEASE ORAL DAILY
Qty: 90 TABLET | Refills: 1 | OUTPATIENT
Start: 2020-04-07

## 2020-04-07 RX ORDER — PANTOPRAZOLE SODIUM 40 MG/1
40 TABLET, DELAYED RELEASE ORAL DAILY
Qty: 90 TABLET | Refills: 1 | Status: SHIPPED | OUTPATIENT
Start: 2020-04-07 | End: 2020-09-16

## 2020-04-07 NOTE — TELEPHONE ENCOUNTER
Pt needs both prescriptions changed to 90 day refills and sent to [facility]     Pt contact # [phone]

## 2020-04-13 DIAGNOSIS — Z17.0 MALIGNANT NEOPLASM OF BREAST IN FEMALE, ESTROGEN RECEPTOR POSITIVE, UNSPECIFIED LATERALITY, UNSPECIFIED SITE OF BREAST (HCC): Primary | ICD-10-CM

## 2020-04-13 DIAGNOSIS — C50.919 MALIGNANT NEOPLASM OF BREAST IN FEMALE, ESTROGEN RECEPTOR POSITIVE, UNSPECIFIED LATERALITY, UNSPECIFIED SITE OF BREAST (HCC): Primary | ICD-10-CM

## 2020-04-13 RX ORDER — ANASTROZOLE 1 MG/1
1 TABLET ORAL DAILY
Qty: 90 TABLET | Refills: 1 | Status: SHIPPED | OUTPATIENT
Start: 2020-04-13 | End: 2020-10-27 | Stop reason: SDUPTHER

## 2020-04-13 RX ORDER — ANASTROZOLE 1 MG/1
1 TABLET ORAL DAILY
Qty: 30 TABLET | Refills: 3 | OUTPATIENT
Start: 2020-04-13

## 2020-05-18 ENCOUNTER — TRANSCRIBE ORDERS (OUTPATIENT)
Dept: ADMINISTRATIVE | Facility: HOSPITAL | Age: 55
End: 2020-05-18

## 2020-05-18 DIAGNOSIS — Z12.31 ENCOUNTER FOR SCREENING MAMMOGRAM FOR BREAST CANCER: Primary | ICD-10-CM

## 2020-06-15 ENCOUNTER — HOSPITAL ENCOUNTER (OUTPATIENT)
Dept: MAMMOGRAPHY | Facility: HOSPITAL | Age: 55
Discharge: HOME | End: 2020-06-15
Admitting: INTERNAL MEDICINE

## 2020-06-15 DIAGNOSIS — Z12.31 ENCOUNTER FOR SCREENING MAMMOGRAM FOR BREAST CANCER: ICD-10-CM

## 2020-06-15 PROCEDURE — 77063 BREAST TOMOSYNTHESIS BI: CPT

## 2020-06-15 PROCEDURE — 77067 SCR MAMMO BI INCL CAD: CPT

## 2020-08-13 ENCOUNTER — TELEPHONE (OUTPATIENT)
Dept: ONCOLOGY | Facility: CLINIC | Age: 55
End: 2020-08-13

## 2020-08-13 NOTE — TELEPHONE ENCOUNTER
R/S patient's missed appt to 8/19/20.  Will fax lab order for pt to have done tomorrow prior to appt next week.  cc

## 2020-08-13 NOTE — TELEPHONE ENCOUNTER
Patient called to reschedule cancelled appointment from March, please schedule on a Wednesday in [facility] call back number [phone]

## 2020-08-14 NOTE — PROGRESS NOTES
Hematology/Oncology Outpatient Follow Up    PATIENT NAME:Lisseth Linder  :1965  MRN: [id number]  PRIMARY CARE PHYSICIAN: Kari Lou APRN  REFERRING PHYSICIAN: Kari Lou APRN    Chief Complaint   Patient presents with   • Follow-up     breast cancer        HISTORY OF PRESENT ILLNESS:   This is a 54-year-old female who was originally seen on 10/6/14.  • Patient’s original screening mammogram, not available.   • 14, she underwent needle localized excisional biopsy of the left breast mass and sentinel lymph node biopsy.  Tumor measured 1.1 cm, negative surgical margins.  ER positive, WV positive, HER-2/jamir negative.  • 14 - She had Oncotype DX assay performed on the tumor, which returned with a recurrent score of 10, which corresponds to 7% risk of distant recurrence following five years of Tamoxifen over a course of 10 years.  This places patient at low-risk disease.  ER was positive, WV positive and HER-2/jamir was negative on the validation assay.    • 14 - Patient completed accelerated breast radiation under the care of Dr. Pickett.  • 14 - Patient had a comprehensive genetic analysis with [facility].  This returned with no significant mutation identified, but she was found to have variant of unknown significance in the APC and BARD-1 genes.      • 14 - Patient initiated on Tamoxifen 20 mg daily.   • 6/8/15 - Bilateral digital diagnostic mammogram which showed fatty breasts and postsurgical changes on the left breast, otherwise negative.  Follow up in one year was recommended.  She also had an ultrasound of the left breast which did not show any abnormalities.   • 2/10/16 - EGD and colonoscopy were done.  On the EGD she was found to have erosive gastritis, Manuel erosions and a duodenal ulcer.  Colonoscopy showed tubular adenoma.   • 16 - Follow up mammogram was essentially normal with stable post-op changes in the left breast.  Follow up in one year was recommended.     • 12/28/16 - Anemia work up revealed reticulocyte count (N), ferritin (L) 4, folate > 24, haptoglobin (N) 145, , iron binding capacity (H) 555.  SPEP with immunofixation assay did not reveal any monoclonal protein.  B12 (N) 353.  Methylmalonic acid level (N) 244.    • 1/11/16 - Urinalysis done showed small blood.  Urine culture did not grow any significant organisms.     • 6/16/17 - Bilateral mammogram with tomosynthesis was a stable exam without any abnormalities detected.    • 8/23/17 - Chemistry panel:  BUN 13, creatinine 0.7, liver function tests (N).  Estradiol level < 20.  FHS 31.5, also in the postmenopausal range.    • December 2017 - Patient received Injectafer for iron deficiency anemia.  Received 750 mg Injectafer day 1 and day 8.  • December 2017 - Ferritin 10.    • 12/13/17 - Tamoxifen discontinued.  Arimidex 1 mg daily and Os-Brenden D were initiated.    • 1/24/18 - DEXA scan, normal.    • 6/19/18 - Patient had bilateral diagnostic mammogram with tomosynthesis.  No mammographic evidence of malignancy.  Follow up in one year was recommended.    • 8/8/18 - Ferritin level was 227.  BUN 13.  Creatinine 0.6.   • 6/14/2019 patient had bilateral diagnostic mammogram with tomosynthesis there is no mammographic evidence of malignancy follow-up in 1 year was recommended.  • 2/4/2020-patient had a DEXA scan which showed a normal bone density  • February 2020 patient had anemia work-up which showed persistent iron deficiency with a ferritin level of 4.  Patient was referred back to [facility] and is to receive  IV Injectafer but due to the pandemic she canceled her appointments  • 5/18/2020 patient had bilateral screening mammogram, there was no mammographic evidence of malignancy.  Follow-up in 1 year was recommended      Past Medical History:   Diagnosis Date   • Adenomatous colon polyp    • Allergic rhinitis    • Asthma    • Breast cancer (CMS/HCC)    • Cholelithiasis    • Constipation    • Duodenal ulcer    •  Erosive gastritis    • Hiatal hernia    • History of breast cancer    • Hypothyroidism    • Iron deficiency anemia    • Microscopic hematuria    • SELMA (stress urinary incontinence, female)    • Thyroid nodule        Past Surgical History:   Procedure Laterality Date   • AUGMENTATION MAMMAPLASTY     • BREAST LUMPECTOMY     • BREAST RECONSTRUCTION     • ENDOSCOPY     • SENTINEL LYMPH NODE BIOPSY           Current Outpatient Medications:   •  albuterol sulfate  (90 Base) MCG/ACT inhaler, Inhale 2 puffs., Disp: , Rfl:   •  anastrozole (ARIMIDEX) 1 MG tablet, TAKE 1 TABLET DAILY, Disp: 30 tablet, Rfl: 3  •  anastrozole (ARIMIDEX) 1 MG tablet, Take 1 tablet by mouth Daily., Disp: 90 tablet, Rfl: 1  •  anastrozole (ARIMIDEX) 1 MG tablet, Take 1 tablet by mouth Daily., Disp: 90 tablet, Rfl: 1  •  Calcium Carbonate-Vitamin D (CALCIUM 600+D) 600-200 MG-UNIT tablet, 2 (Two) Times a Day., Disp: , Rfl:   •  loratadine (CLARITIN) 10 MG tablet, CLARITIN 10 MG TABS, Disp: , Rfl:   •  Multiple Vitamin (MULTI-VITAMIN) tablet, MULTI-VITAMIN TABS, Disp: , Rfl:   •  pantoprazole (PROTONIX) 40 MG EC tablet, Take 1 tablet by mouth Daily., Disp: 90 tablet, Rfl: 1    No Known Allergies    Family History   Problem Relation Age of Onset   • Kidney cancer Father    • Prostate cancer Father    • Breast cancer Maternal Aunt    • Prostate cancer Paternal Aunt    • Lung cancer Paternal Uncle    • Diabetes Mother    • Hypertension Mother    • Hyperlipidemia Mother    • Heart disease Other         Grandparents   • Breast cancer Cousin        Cancer-related family history includes Breast cancer in her cousin and maternal aunt; Kidney cancer in her father; Lung cancer in her paternal uncle; Prostate cancer in her father and paternal aunt.    Social History     Tobacco Use   • Smoking status: Never Smoker   • Smokeless tobacco: Never Used   Substance Use Topics   • Alcohol use: No     Frequency: Never   • Drug use: No     I have reviewed and  "confirmed the accuracy of the patient's history: Chief complaint, HPI and ROS as entered by the MA/LPN/RN. Courtney Bautista MD 08/19/20       SUBJECTIVE:    The patient is here for a follow up appointment.  The patient continues Arimidex and Calcium twice a day without any issues.  Performs monthly breast self-exam and denies any lumps nipple discharge or skin discoloration.  She did not receive IV Injectafer due to the pandemic has not followed up with [facility] but she will try to make those appointments now.  Her energy level is good.  Denies any obvious bleeding issues          REVIEW OF SYSTEMS:  Review of Systems   Constitutional: Negative for chills and fever.   HENT: Negative for ear pain, mouth sores, nosebleeds and sore throat.    Eyes: Negative for photophobia and visual disturbance.   Respiratory: Negative for wheezing and stridor.    Cardiovascular: Negative for chest pain and palpitations.   Gastrointestinal: Negative for abdominal pain, diarrhea, nausea and vomiting.   Endocrine: Negative for cold intolerance and heat intolerance.   Genitourinary: Negative for dysuria and hematuria.   Musculoskeletal: Negative for joint swelling and neck stiffness.   Skin: Negative for color change and rash.   Neurological: Negative for seizures and syncope.   Hematological: Negative for adenopathy.        No obvious bleeding   Psychiatric/Behavioral: Negative for agitation, confusion and hallucinations.     ROS as documented above  OBJECTIVE:    Vitals:    08/19/20 1050   BP: 148/88   Pulse: 92   Resp: 18   Temp: 98 °F (36.7 °C)   TempSrc: Temporal   Weight: 90.3 kg (199 lb)   Height: 167.6 cm (66\")   PainSc: 0-No pain       ECOG  (0) Fully active, able to carry on all predisease performance without restriction    Physical Exam   Constitutional: She is oriented to person, place, and time. No distress.   HENT:   Head: Normocephalic and atraumatic.   Eyes: Conjunctivae and EOM are normal. Right eye exhibits no " discharge. Left eye exhibits no discharge. No scleral icterus.   Neck: Normal range of motion. Neck supple. No thyromegaly present.   Cardiovascular: Normal rate, regular rhythm and normal heart sounds. Exam reveals no gallop and no friction rub.   Pulmonary/Chest: Effort normal. No stridor. No respiratory distress. She has no wheezes. Right breast exhibits tenderness. Right breast exhibits no inverted nipple, no mass, no nipple discharge and no skin change. Left breast exhibits no inverted nipple, no mass, no nipple discharge, no skin change and no tenderness. No breast bleeding. Breasts are symmetrical.   Bilateral breast exam was negative  Bilateral axillary exam was negative   Abdominal: Soft. Bowel sounds are normal. She exhibits no mass. There is no tenderness. There is no rebound and no guarding.   Musculoskeletal: Normal range of motion. She exhibits no tenderness.   Lymphadenopathy:     She has no cervical adenopathy.   Neurological: She is alert and oriented to person, place, and time. She exhibits normal muscle tone.   Skin: Skin is warm. No rash noted. She is not diaphoretic. No erythema.   Psychiatric: She has a normal mood and affect. Her behavior is normal.   Nursing note and vitals reviewed.    Physical exam as documented above  RECENT LABS  WBC   Date Value Ref Range Status   02/12/2020 9.31 3.40 - 10.80 10*3/mm3 Final     RBC   Date Value Ref Range Status   02/12/2020 3.93 3.77 - 5.28 10*6/mm3 Final     Hemoglobin   Date Value Ref Range Status   02/12/2020 9.0 (L) 12.0 - 15.9 g/dL Final     Hematocrit   Date Value Ref Range Status   02/12/2020 29.9 (L) 34.0 - 46.6 % Final     MCV   Date Value Ref Range Status   02/12/2020 76.1 (L) 79.0 - 97.0 fL Final     MCH   Date Value Ref Range Status   02/12/2020 22.9 (L) 26.6 - 33.0 pg Final     MCHC   Date Value Ref Range Status   02/12/2020 30.1 (L) 31.5 - 35.7 g/dL Final     RDW   Date Value Ref Range Status   02/12/2020 20.9 (H) 12.3 - 15.4 % Final      RDW-SD   Date Value Ref Range Status   02/12/2020 55.9 (H) 37.0 - 54.0 fl Final     MPV   Date Value Ref Range Status   02/12/2020 8.4 6.0 - 12.0 fL Final     Platelets   Date Value Ref Range Status   02/12/2020 442 140 - 450 10*3/mm3 Final     Neutrophil %   Date Value Ref Range Status   02/12/2020 65.6 42.7 - 76.0 % Final     Lymphocyte %   Date Value Ref Range Status   02/12/2020 23.4 19.6 - 45.3 % Final     Monocyte %   Date Value Ref Range Status   02/12/2020 8.2 5.0 - 12.0 % Final     Eosinophil %   Date Value Ref Range Status   02/12/2020 2.4 0.3 - 6.2 % Final     Basophil %   Date Value Ref Range Status   02/12/2020 0.4 0.0 - 1.5 % Final     Neutrophils, Absolute   Date Value Ref Range Status   02/12/2020 6.11 1.70 - 7.00 10*3/mm3 Final     Lymphocytes, Absolute   Date Value Ref Range Status   02/12/2020 2.18 0.70 - 3.10 10*3/mm3 Final     Monocytes, Absolute   Date Value Ref Range Status   02/12/2020 0.76 0.10 - 0.90 10*3/mm3 Final     Eosinophils, Absolute   Date Value Ref Range Status   02/12/2020 0.22 0.00 - 0.40 10*3/mm3 Final     Basophils, Absolute   Date Value Ref Range Status   02/12/2020 0.04 0.00 - 0.20 10*3/mm3 Final       Lab Results   Component Value Date    GLUCOSE 125 (H) 02/12/2020    BUN 15 02/12/2020    CREATININE 0.67 02/12/2020    EGFRIFNONA 91 02/12/2020    EGFRIFAFRI 97 10/16/2018    BCR 22.4 02/12/2020    K 3.7 02/12/2020    CO2 25.0 02/12/2020    CALCIUM 9.8 02/12/2020    PROTENTOTREF 6.9 02/12/2020    ALBUMIN 4.20 02/12/2020    LABIL2 1.1 02/12/2020    AST 14 02/12/2020    ALT 13 02/12/2020         Assessment/Plan     There are no diagnoses linked to this encounter.    ASSESSMENT:  1. Invasive ductal carcinoma involving the left breast, status post left excisional biopsy with sentinel lymph node biopsy, ER positive, VT positive, HER-2/jamir negative for T1c N0 M0.  Ongoing surveillance  2. She has completed accelerated partial breast radiation under the care of   Piyush.  3. Negative surgical margin of excision.   4. Low recurrence score on Oncotype DX assay.     5. Family history of three relatives with breast cancer including patient, worrisome for hereditary breast cancer syndrome.   6. Negative for deleterious mutation, but patient was found to have variant of unknown significance in the APC and BARD-1 genes.  7. Status post EGD which showed erosive gastritis, Manuel erosions and duodenal ulcer in February 2016.  Status post colonoscopy which showed tubular adenoma in February 2016.  8. Recurrent iron deficiency anemia status post EGD and colonoscopy in 2016.  Have recommended follow-up with GI  9. Long history of hematuria.   10. Status post Tamoxifen 12/22/14 to 12/13/17.  Arimidex initiated 12/13/17.    11. Lesion on the right lateral aspect of her nose bridge, followed by Dermatology and benign per patient.    PLANS:    She has recurrent microcytic anemia likely due to to iron deficiency.  Sidney CBC and CMP now.  She will continue with Arimidex and Os-Brenden D.  Monitoring for toxicities    She is due for bilateral diagnostic mammogram in May 2021.      Continue monthly breast self-exam and call for lumps, nipple discharge, skin discoloration or breast pain.  She will continue Arimidex and Os-Brenden D.    CBC and CMP will be ordered today to reassess her anemia and also monitoring for toxicities on aromatase inhibitor    Bone density is due 2/4/22       I have reviewed labs results, imaging, vitals, and medications with the patient today. Will follow up in 4 months with me.         Patient verbalized understanding and is in agreement of the above plan.

## 2020-08-19 ENCOUNTER — OFFICE VISIT (OUTPATIENT)
Dept: ONCOLOGY | Facility: CLINIC | Age: 55
End: 2020-08-19

## 2020-08-19 VITALS
TEMPERATURE: 98 F | HEIGHT: 66 IN | RESPIRATION RATE: 18 BRPM | HEART RATE: 92 BPM | SYSTOLIC BLOOD PRESSURE: 148 MMHG | BODY MASS INDEX: 31.98 KG/M2 | WEIGHT: 199 LBS | DIASTOLIC BLOOD PRESSURE: 88 MMHG

## 2020-08-19 DIAGNOSIS — D50.0 IRON DEFICIENCY ANEMIA DUE TO CHRONIC BLOOD LOSS: Primary | ICD-10-CM

## 2020-08-19 PROCEDURE — 99214 OFFICE O/P EST MOD 30 MIN: CPT | Performed by: INTERNAL MEDICINE

## 2020-09-16 RX ORDER — PANTOPRAZOLE SODIUM 40 MG/1
TABLET, DELAYED RELEASE ORAL
Qty: 90 TABLET | Refills: 3 | Status: SHIPPED | OUTPATIENT
Start: 2020-09-16 | End: 2021-09-13

## 2020-10-12 ENCOUNTER — TELEPHONE (OUTPATIENT)
Dept: ONCOLOGY | Facility: CLINIC | Age: 55
End: 2020-10-12

## 2020-10-12 NOTE — TELEPHONE ENCOUNTER
This is a treatment med and goes through [facility].     Electronically signed by LELA Hunt, 10/12/20, 12:43 PM EDT.

## 2020-10-12 NOTE — TELEPHONE ENCOUNTER
Pt needs a refill for anastrozole (ARIMIDEX) 1 MG. The prescription should go through [facility].    The pt has about a week's supply left.    Phone #: [phone]

## 2020-10-13 DIAGNOSIS — Z17.0 MALIGNANT NEOPLASM OF BREAST IN FEMALE, ESTROGEN RECEPTOR POSITIVE, UNSPECIFIED LATERALITY, UNSPECIFIED SITE OF BREAST (HCC): Primary | ICD-10-CM

## 2020-10-13 DIAGNOSIS — C50.919 MALIGNANT NEOPLASM OF BREAST IN FEMALE, ESTROGEN RECEPTOR POSITIVE, UNSPECIFIED LATERALITY, UNSPECIFIED SITE OF BREAST (HCC): Primary | ICD-10-CM

## 2020-10-13 RX ORDER — ANASTROZOLE 1 MG/1
1 TABLET ORAL DAILY
Qty: 90 TABLET | Refills: 1 | Status: SHIPPED | OUTPATIENT
Start: 2020-10-13 | End: 2021-03-25

## 2020-10-27 ENCOUNTER — OFFICE VISIT (OUTPATIENT)
Dept: FAMILY MEDICINE CLINIC | Facility: CLINIC | Age: 55
End: 2020-10-27

## 2020-10-27 ENCOUNTER — TELEPHONE (OUTPATIENT)
Dept: FAMILY MEDICINE CLINIC | Facility: CLINIC | Age: 55
End: 2020-10-27

## 2020-10-27 VITALS
TEMPERATURE: 97.6 F | HEIGHT: 65 IN | DIASTOLIC BLOOD PRESSURE: 88 MMHG | OXYGEN SATURATION: 95 % | BODY MASS INDEX: 32.82 KG/M2 | WEIGHT: 197 LBS | RESPIRATION RATE: 16 BRPM | HEART RATE: 86 BPM | SYSTOLIC BLOOD PRESSURE: 142 MMHG

## 2020-10-27 DIAGNOSIS — R03.0 ELEVATED BLOOD PRESSURE READING: ICD-10-CM

## 2020-10-27 DIAGNOSIS — R31.1 BENIGN ESSENTIAL MICROSCOPIC HEMATURIA: ICD-10-CM

## 2020-10-27 DIAGNOSIS — Z12.11 SCREENING FOR COLON CANCER: ICD-10-CM

## 2020-10-27 DIAGNOSIS — D50.0 IRON DEFICIENCY ANEMIA DUE TO CHRONIC BLOOD LOSS: ICD-10-CM

## 2020-10-27 DIAGNOSIS — J45.20 MILD INTERMITTENT ASTHMA WITHOUT COMPLICATION: ICD-10-CM

## 2020-10-27 DIAGNOSIS — E05.90 HYPERTHYROIDISM: ICD-10-CM

## 2020-10-27 DIAGNOSIS — K80.20 CALCULUS OF GALLBLADDER WITHOUT CHOLECYSTITIS WITHOUT OBSTRUCTION: ICD-10-CM

## 2020-10-27 DIAGNOSIS — Z85.3 HISTORY OF BREAST CANCER: ICD-10-CM

## 2020-10-27 DIAGNOSIS — K44.9 HIATAL HERNIA: ICD-10-CM

## 2020-10-27 DIAGNOSIS — Z00.00 PREVENTATIVE HEALTH CARE: Primary | ICD-10-CM

## 2020-10-27 DIAGNOSIS — J30.1 SEASONAL ALLERGIC RHINITIS DUE TO POLLEN: ICD-10-CM

## 2020-10-27 DIAGNOSIS — E04.1 THYROID NODULE: ICD-10-CM

## 2020-10-27 DIAGNOSIS — Z12.4 SCREENING FOR CERVICAL CANCER: ICD-10-CM

## 2020-10-27 LAB
BILIRUB BLD-MCNC: NEGATIVE MG/DL
CLARITY, POC: CLEAR
COLOR UR: YELLOW
GLUCOSE UR STRIP-MCNC: NEGATIVE MG/DL
KETONES UR QL: ABNORMAL
LEUKOCYTE EST, POC: NEGATIVE
NITRITE UR-MCNC: NEGATIVE MG/ML
PH UR: 5.5 [PH] (ref 5–8)
PROT UR STRIP-MCNC: ABNORMAL MG/DL
RBC # UR STRIP: ABNORMAL /UL
SP GR UR: 1.03 (ref 1–1.03)
UROBILINOGEN UR QL: NORMAL

## 2020-10-27 PROCEDURE — 81003 URINALYSIS AUTO W/O SCOPE: CPT | Performed by: NURSE PRACTITIONER

## 2020-10-27 PROCEDURE — 99213 OFFICE O/P EST LOW 20 MIN: CPT | Performed by: NURSE PRACTITIONER

## 2020-10-27 PROCEDURE — 99396 PREV VISIT EST AGE 40-64: CPT | Performed by: NURSE PRACTITIONER

## 2020-10-27 RX ORDER — FERROUS SULFATE 325(65) MG
325 TABLET ORAL
COMMUNITY
End: 2022-09-13

## 2020-10-27 NOTE — ASSESSMENT & PLAN NOTE
Followed by Dr. Vital in the past, but has not seen him for about 2 years.  We will recheck lab work and refer back to him if abnormal.

## 2020-10-27 NOTE — PROGRESS NOTES
Chief Complaint   Patient presents with   • Annual Exam        Subjective     Lissethyoav Linder  has a past medical history of Adenomatous colon polyp, Allergic rhinitis, Asthma, Breast cancer (CMS/HCC), Cholelithiasis, Constipation, Duodenal ulcer, Erosive gastritis, Hiatal hernia, History of breast cancer, Hypothyroidism, Iron deficiency anemia, Microscopic hematuria, SELMA (stress urinary incontinence, female), and Thyroid nodule.    Gynecologic Exam  The patient's pertinent negatives include no genital itching, genital lesions, genital odor, genital rash, pelvic pain or vaginal discharge. The patient is experiencing no pain. Pertinent negatives include no abdominal pain, back pain, chills, constipation, diarrhea, dysuria, fever, flank pain, frequency, hematuria, nausea, rash, sore throat, urgency or vomiting. She is postmenopausal.   Thyroid Problem  Presents for follow-up visit. Patient reports no anxiety, cold intolerance, constipation, depressed mood, diaphoresis, diarrhea, fatigue, heat intolerance, palpitations, tremors, weight gain or weight loss. The symptoms have been stable.   Thyroid nodules noted on past ultrasound.  She has had decreased TSH with normal free T4 in the past.  Was followed by Dr. Vital until 2018, however she has not followed back up with him.    PHQ-2 Depression Screening  Little interest or pleasure in doing things? 0   Feeling down, depressed, or hopeless? 0   PHQ-2 Total Score 0     No Known Allergies      Current Outpatient Medications:   •  albuterol sulfate  (90 Base) MCG/ACT inhaler, Inhale 2 puffs., Disp: , Rfl:   •  anastrozole (ARIMIDEX) 1 MG tablet, Take 1 tablet by mouth Daily., Disp: 90 tablet, Rfl: 1  •  Calcium Carbonate-Vitamin D (CALCIUM 600+D) 600-200 MG-UNIT tablet, 2 (Two) Times a Day., Disp: , Rfl:   •  ferrous sulfate 325 (65 FE) MG tablet, Take 325 mg by mouth Daily With Breakfast., Disp: , Rfl:   •  loratadine (CLARITIN) 10 MG tablet, CLARITIN 10 MG TABS,  Disp: , Rfl:   •  Multiple Vitamin (MULTI-VITAMIN) tablet, MULTI-VITAMIN TABS, Disp: , Rfl:   •  pantoprazole (PROTONIX) 40 MG EC tablet, TAKE 1 TABLET DAILY, Disp: 90 tablet, Rfl: 3    Patient Active Problem List   Diagnosis   • Hematuria, unspecified   • Abnormal serum thyroid stimulating hormone (TSH) level   • Allergic rhinitis   • Anxiety   • Asthma   • Calculus of gallbladder   • Hiatal hernia   • Hyperthyroidism   • Iron deficiency anemia   • Thyroid nodule   • Chronic insomnia   • History of breast cancer   • Malignant neoplasm of breast in female, estrogen receptor positive (CMS/HCC)        Past Surgical History:   Procedure Laterality Date   • AUGMENTATION MAMMAPLASTY  1989   • BREAST LUMPECTOMY Left 2014   • BREAST RECONSTRUCTION Left 2014   • ENDOSCOPY     • SENTINEL LYMPH NODE BIOPSY         Social History     Socioeconomic History   • Marital status:      Spouse name: Not on file   • Number of children: Not on file   • Years of education: Not on file   • Highest education level: Not on file   Tobacco Use   • Smoking status: Never Smoker   • Smokeless tobacco: Never Used   Substance and Sexual Activity   • Alcohol use: No     Frequency: Never   • Drug use: No   • Sexual activity: Defer       Family History   Problem Relation Age of Onset   • Kidney cancer Father    • Prostate cancer Father    • Breast cancer Maternal Aunt    • Prostate cancer Paternal Aunt    • Lung cancer Paternal Uncle    • Diabetes Mother    • Hypertension Mother    • Hyperlipidemia Mother    • Heart disease Other         Grandparents   • Breast cancer Cousin        Family history, surgical history, past medical history, Allergies and med's reviewed with patient today and updated in [facility] EMR.     ROS:  Review of Systems   Constitutional: Negative for appetite change, chills, diaphoresis, fatigue, fever, unexpected weight gain and unexpected weight loss.   HENT: Negative for congestion, ear discharge, ear pain, hearing loss,  "mouth sores, nosebleeds, postnasal drip, rhinorrhea, sinus pressure, sneezing, sore throat, swollen glands and trouble swallowing.    Eyes: Negative for blurred vision, double vision, pain, discharge, redness and itching.   Respiratory: Negative for apnea, cough, choking, shortness of breath, wheezing and stridor.    Cardiovascular: Negative for chest pain, palpitations and leg swelling.   Gastrointestinal: Negative for abdominal distention, abdominal pain, anal bleeding, blood in stool, constipation, diarrhea, nausea, rectal pain, vomiting, GERD and indigestion.   Endocrine: Negative for cold intolerance, heat intolerance, polydipsia, polyphagia and polyuria.   Genitourinary: Negative for breast discharge, breast lump, breast pain, difficulty urinating, dysuria, flank pain, frequency, genital sores, hematuria, pelvic pain, urgency, urinary incontinence, vaginal bleeding, vaginal discharge and vaginal pain.   Musculoskeletal: Negative for arthralgias, back pain, gait problem, joint swelling, myalgias, neck pain and neck stiffness.   Skin: Negative for color change, rash and skin lesions.   Neurological: Negative for dizziness, tremors, seizures, syncope, speech difficulty, weakness, light-headedness, numbness, headache, memory problem and confusion.   Hematological: Negative for adenopathy. Does not bruise/bleed easily.   Psychiatric/Behavioral: Negative for self-injury, sleep disturbance, suicidal ideas, depressed mood and stress. The patient is not nervous/anxious.        OBJECTIVE:  Vitals:    10/27/20 1406   BP: 142/88   BP Location: Right arm   Patient Position: Sitting   Cuff Size: Large Adult   Pulse: 86   Resp: 16   Temp: 97.6 °F (36.4 °C)   TempSrc: Infrared   SpO2: 95%   Weight: 89.4 kg (197 lb)   Height: 165.1 cm (65\")     Body mass index is 32.78 kg/m².     Physical Exam  Vitals signs reviewed.   Constitutional:       General: She is not in acute distress.     Appearance: She is well-developed.   HENT: "      Head: Normocephalic and atraumatic.      Right Ear: Tympanic membrane, ear canal and external ear normal. Tympanic membrane is not injected, erythematous, retracted or bulging.      Left Ear: Tympanic membrane, ear canal and external ear normal. Tympanic membrane is not injected, erythematous, retracted or bulging.      Nose: Nose normal. No mucosal edema or rhinorrhea.      Right Sinus: No maxillary sinus tenderness or frontal sinus tenderness.      Left Sinus: No maxillary sinus tenderness or frontal sinus tenderness.      Mouth/Throat:      Mouth: No oral lesions.      Pharynx: Uvula midline. No oropharyngeal exudate or posterior oropharyngeal erythema.   Eyes:      General: Lids are normal.         Right eye: No discharge.         Left eye: No discharge.      Conjunctiva/sclera: Conjunctivae normal.      Pupils: Pupils are equal, round, and reactive to light.   Neck:      Musculoskeletal: Normal range of motion and neck supple.      Thyroid: No thyroid mass or thyromegaly.      Vascular: No carotid bruit or JVD.      Trachea: No tracheal deviation.   Cardiovascular:      Rate and Rhythm: Normal rate and regular rhythm.      Heart sounds: Normal heart sounds. No murmur. No friction rub. No gallop.    Pulmonary:      Effort: Pulmonary effort is normal. No respiratory distress.      Breath sounds: Normal breath sounds. No wheezing or rales.   Chest:      Breasts: Breasts are symmetrical.         Right: No inverted nipple, mass, nipple discharge, skin change or tenderness.         Left: No inverted nipple, mass, nipple discharge, skin change or tenderness.   Abdominal:      General: Bowel sounds are normal. There is no distension.      Palpations: Abdomen is soft. There is no mass.      Tenderness: There is no abdominal tenderness.      Hernia: No hernia is present.   Genitourinary:     Exam position: Supine.      Labia:         Right: No rash, tenderness or lesion.         Left: No rash, tenderness or lesion.        Vagina: Normal. No vaginal discharge, erythema or tenderness.      Cervix: No cervical motion tenderness, discharge or friability.      Uterus: Not tender.       Adnexa:         Right: No mass, tenderness or fullness.          Left: No mass, tenderness or fullness.        Rectum: Normal.   Musculoskeletal: Normal range of motion.         General: No deformity.   Lymphadenopathy:      Cervical: No cervical adenopathy.      Lower Body: No right inguinal adenopathy. No left inguinal adenopathy.   Skin:     General: Skin is warm and dry.      Findings: No lesion or rash.   Neurological:      Mental Status: She is alert and oriented to person, place, and time.      Cranial Nerves: No cranial nerve deficit.      Sensory: No sensory deficit.      Gait: Gait normal.      Deep Tendon Reflexes: Reflexes are normal and symmetric.   Psychiatric:         Speech: Speech normal.         Behavior: Behavior normal.         Thought Content: Thought content normal.         Judgment: Judgment normal.         Office Visit on 10/27/2020   Component Date Value Ref Range Status   • Color 10/27/2020 Yellow  Yellow, Straw, Dark Yellow, [place] Final   • Clarity, UA 10/27/2020 Clear  Clear Final   • Specific Gravity  10/27/2020 1.030  1.005 - 1.030 Final   • pH, Urine 10/27/2020 5.5  5.0 - 8.0 Final   • Leukocytes 10/27/2020 Negative  Negative Final   • Nitrite, UA 10/27/2020 Negative  Negative Final   • Protein, POC 10/27/2020 Trace* Negative mg/dL Final   • Glucose, UA 10/27/2020 Negative  Negative, 1000 mg/dL (3+) mg/dL Final   • Ketones, UA 10/27/2020 Trace* Negative Final   • Urobilinogen, UA 10/27/2020 Normal  Normal Final   • Bilirubin 10/27/2020 Negative  Negative Final   • Blood, UA 10/27/2020 Trace* Negative Final     Lab Results   Component Value Date    CHOL 182 10/16/2018    TRIG 135 10/16/2018    HDL 66 10/16/2018    LDL 93 MG/DL (CALC) 10/16/2018     Lab Results   Component Value Date    WBC 9.31 02/12/2020    HGB 9.0 (L)  02/12/2020    HCT 29.9 (L) 02/12/2020    MCV 76.1 (L) 02/12/2020     02/12/2020     Lab Results   Component Value Date    GLUCOSE 125 (H) 02/12/2020    BUN 15 02/12/2020    CREATININE 0.67 02/12/2020    EGFRIFNONA 91 02/12/2020    EGFRIFAFRI 97 10/16/2018    BCR 22.4 02/12/2020    K 3.7 02/12/2020    CO2 25.0 02/12/2020    CALCIUM 9.8 02/12/2020    PROTENTOTREF 6.9 02/12/2020    ALBUMIN 4.20 02/12/2020    LABIL2 1.1 02/12/2020    AST 14 02/12/2020    ALT 13 02/12/2020     Lab Results   Component Value Date    TSH 0.34 (L) 10/16/2018       ASSESSMENT/ PLAN:    Diagnoses and all orders for this visit:    1. Preventative health care (Primary)  Comments:  Discussed age-appropriate health maintenance.  Orders:  -     POC Urinalysis Dipstick, Automated    2. Hyperthyroidism  Assessment & Plan:  Followed by Dr. Vital in the past, but has not seen him for about 2 years.  We will recheck lab work and refer back to him if abnormal.    Orders:  -     T3  -     T4, Free  -     TSH    3. Thyroid nodule  Assessment & Plan:  2018 ultrasound showed mild thyroid enlargement with multiple thyroid nodules, the largest was 1.3 x 1.1 cm in the right thyroid lobe.  Findings are consistent with multinodular goiter.  2019 nuclear medicine thyroid imaging with uptake showed normal uptake and no focal lesion.      Orders:  -     US Thyroid; Future    4. Elevated blood pressure reading  Comments:  Return for nurse visit in 1-2 weeks to recheck.     5. Iron deficiency anemia due to chronic blood loss  Assessment & Plan:  Followed by Dr. Bautista and Dr. Daniels.    Orders:  -     CBC & Differential  -     Comprehensive Metabolic Panel    6. Mild intermittent asthma without complication  Assessment & Plan:  Mild.  Has not needed rescue inhaler in over 2 years.  Report any worsening.        7. Seasonal allergic rhinitis due to pollen  Assessment & Plan:  Mild.  Uses over-the-counter antihistamine as needed.      8. Calculus of gallbladder  without cholecystitis without obstruction  Assessment & Plan:  Asymptomatic.      9. Benign essential microscopic hematuria  Assessment & Plan:  Negative work-up by urology in the past, including CT of the abdomen and pelvis.  No worsening.  We will continue to monitor.      10. Hiatal hernia  Assessment & Plan:  Continue follow-up with GI.      11. History of breast cancer  Assessment & Plan:  Negative mammogram in June 2020.  Continue follow-up with Dr. Bautista.      12. Screening for cervical cancer  -     PAP, Liquid Based ([facility] Only)    13. Screening for colon cancer  Comments:  Colonoscopy due February 2021.  Seeing GI next month, may be having this done earlier due to FAUSTO.    Other orders  -     Cancel: Fluarix/Fluzone/Afluria Quad>6 Months      Orders Placed Today:     No orders of the defined types were placed in this encounter.       Management Plan:     An After Visit Summary was printed and given to the patient at discharge.    Follow-up: No follow-ups on file.    LELA Caldwell 10/27/2020 15:05 EDT  This note was electronically signed.

## 2020-10-27 NOTE — ASSESSMENT & PLAN NOTE
Negative work-up by urology in the past, including CT of the abdomen and pelvis.  No worsening.  We will continue to monitor.

## 2020-10-27 NOTE — ASSESSMENT & PLAN NOTE
2018 ultrasound showed mild thyroid enlargement with multiple thyroid nodules, the largest was 1.3 x 1.1 cm in the right thyroid lobe.  Findings are consistent with multinodular goiter.  2019 nuclear medicine thyroid imaging with uptake showed normal uptake and no focal lesion.

## 2020-10-27 NOTE — TELEPHONE ENCOUNTER
Let her know that I was looking back at her last thyroid ultrasound (November 2018) and they recommended that she have the thyroid nodules rechecked in 1 year. I've put in an order for the ultrasound, they should call her from downstairs to get this scheduled.   Also - her blood pressure was a little high today. Have her stop in for a recheck of the blood pressure when she has the thyroid ultrasound done. If it is still high I want her to schedule a follow-up appointment with me.

## 2020-10-28 PROBLEM — R74.8 ELEVATED ALKALINE PHOSPHATASE LEVEL: Status: ACTIVE | Noted: 2020-10-28

## 2020-10-28 LAB
ALBUMIN SERPL-MCNC: 4.3 G/DL (ref 3.8–4.9)
ALBUMIN/GLOB SERPL: 1.4 {RATIO} (ref 1.2–2.2)
ALP SERPL-CCNC: 120 IU/L (ref 39–117)
ALT SERPL-CCNC: 16 IU/L (ref 0–32)
AST SERPL-CCNC: 21 IU/L (ref 0–40)
BASOPHILS # BLD AUTO: 0 X10E3/UL (ref 0–0.2)
BASOPHILS NFR BLD AUTO: 0 %
BILIRUB SERPL-MCNC: 0.3 MG/DL (ref 0–1.2)
BUN SERPL-MCNC: 16 MG/DL (ref 6–24)
BUN/CREAT SERPL: 21 (ref 9–23)
CALCIUM SERPL-MCNC: 9.9 MG/DL (ref 8.7–10.2)
CHLORIDE SERPL-SCNC: 104 MMOL/L (ref 96–106)
CO2 SERPL-SCNC: 26 MMOL/L (ref 20–29)
CREAT SERPL-MCNC: 0.77 MG/DL (ref 0.57–1)
EOSINOPHIL # BLD AUTO: 0.2 X10E3/UL (ref 0–0.4)
EOSINOPHIL NFR BLD AUTO: 3 %
ERYTHROCYTE [DISTWIDTH] IN BLOOD BY AUTOMATED COUNT: 16.9 % (ref 11.7–15.4)
GLOBULIN SER CALC-MCNC: 3.1 G/DL (ref 1.5–4.5)
GLUCOSE SERPL-MCNC: 83 MG/DL (ref 65–99)
HCT VFR BLD AUTO: 37.8 % (ref 34–46.6)
HGB BLD-MCNC: 12.2 G/DL (ref 11.1–15.9)
IMM GRANULOCYTES # BLD AUTO: 0 X10E3/UL (ref 0–0.1)
IMM GRANULOCYTES NFR BLD AUTO: 0 %
LYMPHOCYTES # BLD AUTO: 2.2 X10E3/UL (ref 0.7–3.1)
LYMPHOCYTES NFR BLD AUTO: 25 %
MCH RBC QN AUTO: 27 PG (ref 26.6–33)
MCHC RBC AUTO-ENTMCNC: 32.3 G/DL (ref 31.5–35.7)
MCV RBC AUTO: 84 FL (ref 79–97)
MONOCYTES # BLD AUTO: 0.8 X10E3/UL (ref 0.1–0.9)
MONOCYTES NFR BLD AUTO: 9 %
NEUTROPHILS # BLD AUTO: 5.7 X10E3/UL (ref 1.4–7)
NEUTROPHILS NFR BLD AUTO: 63 %
PLATELET # BLD AUTO: 386 X10E3/UL (ref 150–450)
POTASSIUM SERPL-SCNC: 4.4 MMOL/L (ref 3.5–5.2)
PROT SERPL-MCNC: 7.4 G/DL (ref 6–8.5)
RBC # BLD AUTO: 4.52 X10E6/UL (ref 3.77–5.28)
SODIUM SERPL-SCNC: 143 MMOL/L (ref 134–144)
T3 SERPL-MCNC: 105 NG/DL (ref 71–180)
T4 FREE SERPL-MCNC: 1.44 NG/DL (ref 0.82–1.77)
TSH SERPL DL<=0.005 MIU/L-ACNC: 0.42 UIU/ML (ref 0.45–4.5)
WBC # BLD AUTO: 9 X10E3/UL (ref 3.4–10.8)

## 2020-10-30 LAB
CYTOLOGIST CVX/VAG CYTO: NORMAL
CYTOLOGY CVX/VAG DOC CYTO: NORMAL
DX ICD CODE: NORMAL
HIV 1 & 2 AB SER-IMP: NORMAL
Lab: NORMAL
OTHER STN SPEC: NORMAL
RECOM F/U CVX/VAG CYTO: NORMAL
STAT OF ADQ CVX/VAG CYTO-IMP: NORMAL

## 2020-11-09 ENCOUNTER — APPOINTMENT (OUTPATIENT)
Dept: OTHER | Facility: HOSPITAL | Age: 55
End: 2020-11-09

## 2020-11-09 ENCOUNTER — HOSPITAL ENCOUNTER (OUTPATIENT)
Dept: ULTRASOUND IMAGING | Facility: HOSPITAL | Age: 55
Discharge: HOME | End: 2020-11-09

## 2020-11-09 DIAGNOSIS — E04.1 THYROID NODULE: ICD-10-CM

## 2020-11-09 DIAGNOSIS — E04.2 MULTIPLE THYROID NODULES: ICD-10-CM

## 2020-11-09 PROCEDURE — 76536 US EXAM OF HEAD AND NECK: CPT

## 2020-11-10 ENCOUNTER — OFFICE (OUTPATIENT)
Dept: URBAN - METROPOLITAN AREA CLINIC 64 | Facility: CLINIC | Age: 55
End: 2020-11-10

## 2020-11-10 VITALS
SYSTOLIC BLOOD PRESSURE: 114 MMHG | HEART RATE: 108 BPM | DIASTOLIC BLOOD PRESSURE: 81 MMHG | HEIGHT: 65 IN | WEIGHT: 182 LBS

## 2020-11-10 DIAGNOSIS — Z86.010 PERSONAL HISTORY OF COLONIC POLYPS: ICD-10-CM

## 2020-11-10 DIAGNOSIS — D50.0 IRON DEFICIENCY ANEMIA SECONDARY TO BLOOD LOSS (CHRONIC): ICD-10-CM

## 2020-11-10 PROCEDURE — 99213 OFFICE O/P EST LOW 20 MIN: CPT | Performed by: INTERNAL MEDICINE

## 2020-12-02 ENCOUNTER — OFFICE VISIT (OUTPATIENT)
Dept: ONCOLOGY | Facility: CLINIC | Age: 55
End: 2020-12-02

## 2020-12-02 VITALS
BODY MASS INDEX: 31.65 KG/M2 | TEMPERATURE: 96.9 F | HEIGHT: 65 IN | WEIGHT: 190 LBS | RESPIRATION RATE: 18 BRPM | SYSTOLIC BLOOD PRESSURE: 159 MMHG | HEART RATE: 91 BPM | DIASTOLIC BLOOD PRESSURE: 94 MMHG

## 2020-12-02 DIAGNOSIS — C50.919 MALIGNANT NEOPLASM OF BREAST IN FEMALE, ESTROGEN RECEPTOR POSITIVE, UNSPECIFIED LATERALITY, UNSPECIFIED SITE OF BREAST (HCC): Primary | ICD-10-CM

## 2020-12-02 DIAGNOSIS — Z17.0 MALIGNANT NEOPLASM OF BREAST IN FEMALE, ESTROGEN RECEPTOR POSITIVE, UNSPECIFIED LATERALITY, UNSPECIFIED SITE OF BREAST (HCC): Primary | ICD-10-CM

## 2020-12-02 PROCEDURE — 99214 OFFICE O/P EST MOD 30 MIN: CPT | Performed by: INTERNAL MEDICINE

## 2021-01-06 ENCOUNTER — ON CAMPUS - OUTPATIENT (OUTPATIENT)
Dept: RURAL HOSPITAL 3 | Facility: HOSPITAL | Age: 56
End: 2021-01-06
Payer: COMMERCIAL

## 2021-01-06 DIAGNOSIS — K64.1 SECOND DEGREE HEMORRHOIDS: ICD-10-CM

## 2021-01-06 DIAGNOSIS — D50.9 IRON DEFICIENCY ANEMIA, UNSPECIFIED: ICD-10-CM

## 2021-01-06 DIAGNOSIS — K44.9 DIAPHRAGMATIC HERNIA WITHOUT OBSTRUCTION OR GANGRENE: ICD-10-CM

## 2021-01-06 DIAGNOSIS — Z86.010 PERSONAL HISTORY OF COLONIC POLYPS: ICD-10-CM

## 2021-01-06 DIAGNOSIS — K31.89 OTHER DISEASES OF STOMACH AND DUODENUM: ICD-10-CM

## 2021-01-06 PROCEDURE — 43239 EGD BIOPSY SINGLE/MULTIPLE: CPT | Performed by: INTERNAL MEDICINE

## 2021-01-06 PROCEDURE — 45378 DIAGNOSTIC COLONOSCOPY: CPT | Mod: 33 | Performed by: INTERNAL MEDICINE

## 2021-01-12 ENCOUNTER — TELEPHONE (OUTPATIENT)
Dept: FAMILY MEDICINE CLINIC | Facility: CLINIC | Age: 56
End: 2021-01-12

## 2021-01-12 NOTE — TELEPHONE ENCOUNTER
----- Message from LELA Caldwell sent at 1/11/2021 11:07 AM EST -----  Regarding: path report needed  Please get path report from narcisa biopsy done at [facility] during her EGD on 1/6/21.

## 2021-02-09 ENCOUNTER — TELEPHONE (OUTPATIENT)
Dept: FAMILY MEDICINE CLINIC | Facility: CLINIC | Age: 56
End: 2021-02-09

## 2021-02-09 DIAGNOSIS — R74.8 ELEVATED ALKALINE PHOSPHATASE LEVEL: Primary | ICD-10-CM

## 2021-02-09 NOTE — TELEPHONE ENCOUNTER
----- Message from Maureen Vargas sent at 12/21/2020  1:02 PM EST -----  Regarding: FW: Labs    ----- Message -----  From: Arlet Ren MA  Sent: 10/29/2020  11:48 AM EST  To: [facility] [place] [facility]  Subject: Labs                                             CMP due for elevated alkaline phos.

## 2021-02-09 NOTE — TELEPHONE ENCOUNTER
Patient is due for blood work, I have ordered it but she is wanting to know if you can do a blood count on her as well.    Please advise.

## 2021-02-10 NOTE — TELEPHONE ENCOUNTER
It looks like Dr. Bautista future ordered a CBC and CMP at her December appointment. She should be able to get this labwork done when she has our labs done. Please check on this.

## 2021-02-20 LAB
ALBUMIN SERPL-MCNC: 4.4 G/DL (ref 3.8–4.9)
ALBUMIN/GLOB SERPL: 1.5 {RATIO} (ref 1.2–2.2)
ALP SERPL-CCNC: 130 IU/L (ref 39–117)
ALT SERPL-CCNC: 15 IU/L (ref 0–32)
AST SERPL-CCNC: 16 IU/L (ref 0–40)
BILIRUB SERPL-MCNC: 0.5 MG/DL (ref 0–1.2)
BUN SERPL-MCNC: 12 MG/DL (ref 6–24)
BUN/CREAT SERPL: 17 (ref 9–23)
CALCIUM SERPL-MCNC: 10.3 MG/DL (ref 8.7–10.2)
CHLORIDE SERPL-SCNC: 101 MMOL/L (ref 96–106)
CO2 SERPL-SCNC: 26 MMOL/L (ref 20–29)
CREAT SERPL-MCNC: 0.71 MG/DL (ref 0.57–1)
GLOBULIN SER CALC-MCNC: 2.9 G/DL (ref 1.5–4.5)
GLUCOSE SERPL-MCNC: 93 MG/DL (ref 65–99)
POTASSIUM SERPL-SCNC: 4.4 MMOL/L (ref 3.5–5.2)
PROT SERPL-MCNC: 7.3 G/DL (ref 6–8.5)
SODIUM SERPL-SCNC: 141 MMOL/L (ref 134–144)

## 2021-03-24 DIAGNOSIS — Z17.0 MALIGNANT NEOPLASM OF BREAST IN FEMALE, ESTROGEN RECEPTOR POSITIVE, UNSPECIFIED LATERALITY, UNSPECIFIED SITE OF BREAST (HCC): ICD-10-CM

## 2021-03-24 DIAGNOSIS — C50.919 MALIGNANT NEOPLASM OF BREAST IN FEMALE, ESTROGEN RECEPTOR POSITIVE, UNSPECIFIED LATERALITY, UNSPECIFIED SITE OF BREAST (HCC): ICD-10-CM

## 2021-03-25 RX ORDER — ANASTROZOLE 1 MG/1
TABLET ORAL
Qty: 90 TABLET | Refills: 3 | Status: SHIPPED | OUTPATIENT
Start: 2021-03-25 | End: 2022-03-21

## 2021-04-02 ENCOUNTER — TELEPHONE (OUTPATIENT)
Dept: FAMILY MEDICINE CLINIC | Facility: CLINIC | Age: 56
End: 2021-04-02

## 2021-04-02 NOTE — TELEPHONE ENCOUNTER
"Patient got her COVID-19 vaccine yesterday and has \"COVID arm\" today. Her arm is swollen and itchy. She is using ice and hydrocortisone cream. She is wanting to know what else can be done for this.   "

## 2021-04-07 ENCOUNTER — APPOINTMENT (OUTPATIENT)
Dept: ONCOLOGY | Facility: CLINIC | Age: 56
End: 2021-04-07

## 2021-04-07 ENCOUNTER — TELEPHONE (OUTPATIENT)
Dept: ONCOLOGY | Facility: CLINIC | Age: 56
End: 2021-04-07

## 2021-04-07 NOTE — TELEPHONE ENCOUNTER
4-7-2021 MD appt rescheduled for Friday 4- at 10:15AM.  Dr. Bautista out.  Patient notified and v/u.

## 2021-04-23 ENCOUNTER — OFFICE VISIT (OUTPATIENT)
Dept: ONCOLOGY | Facility: CLINIC | Age: 56
End: 2021-04-23

## 2021-04-23 ENCOUNTER — LAB (OUTPATIENT)
Dept: LAB | Facility: HOSPITAL | Age: 56
End: 2021-04-23

## 2021-04-23 VITALS
WEIGHT: 180 LBS | DIASTOLIC BLOOD PRESSURE: 90 MMHG | RESPIRATION RATE: 18 BRPM | SYSTOLIC BLOOD PRESSURE: 161 MMHG | HEART RATE: 83 BPM | TEMPERATURE: 97.7 F | BODY MASS INDEX: 29.99 KG/M2 | HEIGHT: 65 IN

## 2021-04-23 DIAGNOSIS — C50.919 MALIGNANT NEOPLASM OF BREAST IN FEMALE, ESTROGEN RECEPTOR POSITIVE, UNSPECIFIED LATERALITY, UNSPECIFIED SITE OF BREAST (HCC): ICD-10-CM

## 2021-04-23 DIAGNOSIS — Z17.0 MALIGNANT NEOPLASM OF BREAST IN FEMALE, ESTROGEN RECEPTOR POSITIVE, UNSPECIFIED LATERALITY, UNSPECIFIED SITE OF BREAST (HCC): Primary | ICD-10-CM

## 2021-04-23 DIAGNOSIS — D50.0 IRON DEFICIENCY ANEMIA DUE TO CHRONIC BLOOD LOSS: ICD-10-CM

## 2021-04-23 DIAGNOSIS — C50.919 MALIGNANT NEOPLASM OF BREAST IN FEMALE, ESTROGEN RECEPTOR POSITIVE, UNSPECIFIED LATERALITY, UNSPECIFIED SITE OF BREAST (HCC): Primary | ICD-10-CM

## 2021-04-23 DIAGNOSIS — Z17.0 MALIGNANT NEOPLASM OF BREAST IN FEMALE, ESTROGEN RECEPTOR POSITIVE, UNSPECIFIED LATERALITY, UNSPECIFIED SITE OF BREAST (HCC): ICD-10-CM

## 2021-04-23 LAB
ALBUMIN SERPL-MCNC: 4.2 G/DL (ref 3.5–5.2)
ALBUMIN/GLOB SERPL: 1.2 G/DL
ALP SERPL-CCNC: 107 U/L (ref 39–117)
ALT SERPL W P-5'-P-CCNC: 10 U/L (ref 1–33)
ANION GAP SERPL CALCULATED.3IONS-SCNC: 10 MMOL/L (ref 5–15)
AST SERPL-CCNC: 16 U/L (ref 1–32)
BASOPHILS # BLD AUTO: 0.03 10*3/MM3 (ref 0–0.2)
BASOPHILS NFR BLD AUTO: 0.4 % (ref 0–1.5)
BILIRUB SERPL-MCNC: 0.5 MG/DL (ref 0–1.2)
BUN SERPL-MCNC: 17 MG/DL (ref 6–20)
BUN/CREAT SERPL: 24.3 (ref 7–25)
CALCIUM SPEC-SCNC: 10.3 MG/DL (ref 8.6–10.5)
CHLORIDE SERPL-SCNC: 103 MMOL/L (ref 98–107)
CO2 SERPL-SCNC: 28 MMOL/L (ref 22–29)
CREAT SERPL-MCNC: 0.7 MG/DL (ref 0.57–1)
DEPRECATED RDW RBC AUTO: 43.2 FL (ref 37–54)
EOSINOPHIL # BLD AUTO: 0.29 10*3/MM3 (ref 0–0.4)
EOSINOPHIL NFR BLD AUTO: 3.6 % (ref 0.3–6.2)
ERYTHROCYTE [DISTWIDTH] IN BLOOD BY AUTOMATED COUNT: 13.1 % (ref 12.3–15.4)
GFR SERPL CREATININE-BSD FRML MDRD: 86 ML/MIN/1.73
GLOBULIN UR ELPH-MCNC: 3.4 GM/DL
GLUCOSE SERPL-MCNC: 80 MG/DL (ref 65–99)
HCT VFR BLD AUTO: 41.7 % (ref 34–46.6)
HGB BLD-MCNC: 13.5 G/DL (ref 12–15.9)
LYMPHOCYTES # BLD AUTO: 2.08 10*3/MM3 (ref 0.7–3.1)
LYMPHOCYTES NFR BLD AUTO: 25.8 % (ref 19.6–45.3)
MCH RBC QN AUTO: 30 PG (ref 26.6–33)
MCHC RBC AUTO-ENTMCNC: 32.4 G/DL (ref 31.5–35.7)
MCV RBC AUTO: 92.7 FL (ref 79–97)
MONOCYTES # BLD AUTO: 0.8 10*3/MM3 (ref 0.1–0.9)
MONOCYTES NFR BLD AUTO: 9.9 % (ref 5–12)
NEUTROPHILS NFR BLD AUTO: 4.87 10*3/MM3 (ref 1.7–7)
NEUTROPHILS NFR BLD AUTO: 60.3 % (ref 42.7–76)
PLATELET # BLD AUTO: 304 10*3/MM3 (ref 140–450)
PMV BLD AUTO: 9.6 FL (ref 6–12)
POTASSIUM SERPL-SCNC: 4.7 MMOL/L (ref 3.5–5.2)
PROT SERPL-MCNC: 7.6 G/DL (ref 6–8.5)
RBC # BLD AUTO: 4.5 10*6/MM3 (ref 3.77–5.28)
SODIUM SERPL-SCNC: 141 MMOL/L (ref 136–145)
WBC # BLD AUTO: 8.07 10*3/MM3 (ref 3.4–10.8)

## 2021-04-23 PROCEDURE — 80053 COMPREHEN METABOLIC PANEL: CPT | Performed by: INTERNAL MEDICINE

## 2021-04-23 PROCEDURE — 99214 OFFICE O/P EST MOD 30 MIN: CPT | Performed by: INTERNAL MEDICINE

## 2021-04-23 PROCEDURE — 85025 COMPLETE CBC W/AUTO DIFF WBC: CPT

## 2021-04-23 PROCEDURE — 36415 COLL VENOUS BLD VENIPUNCTURE: CPT | Performed by: INTERNAL MEDICINE

## 2021-04-23 NOTE — PROGRESS NOTES
Hematology/Oncology Outpatient Follow Up    PATIENT NAME:Lisseth Linder  :1965  MRN: [id number]  PRIMARY CARE PHYSICIAN: Kari Lou APRN  REFERRING PHYSICIAN: Kari Lou APRN    Chief Complaint   Patient presents with   • Follow-up     Malignant neoplasm of breast in female, estrogen receptor positive        HISTORY OF PRESENT ILLNESS:     This is a 54-year-old female who was originally seen on 10/6/14.  • Patient’s original screening mammogram, not available.   • 14, she underwent needle localized excisional biopsy of the left breast mass and sentinel lymph node biopsy.  Tumor measured 1.1 cm, negative surgical margins.  ER positive, OR positive, HER-2/jamir negative.  • 14 - She had Oncotype DX assay performed on the tumor, which returned with a recurrent score of 10, which corresponds to 7% risk of distant recurrence following five years of Tamoxifen over a course of 10 years.  This places patient at low-risk disease.  ER was positive, OR positive and HER-2/jamir was negative on the validation assay.    • 14 - Patient completed accelerated breast radiation under the care of Dr. Pickett.  • 14 - Patient had a comprehensive genetic analysis with [facility].  This returned with no significant mutation identified, but she was found to have variant of unknown significance in the APC and BARD-1 genes.      • 14 - Patient initiated on Tamoxifen 20 mg daily.   • 6/8/15 - Bilateral digital diagnostic mammogram which showed fatty breasts and postsurgical changes on the left breast, otherwise negative.  Follow up in one year was recommended.  She also had an ultrasound of the left breast which did not show any abnormalities.   • 2/10/16 - EGD and colonoscopy were done.  On the EGD she was found to have erosive gastritis, Manuel erosions and a duodenal ulcer.  Colonoscopy showed tubular adenoma.   • 16 - Follow up mammogram was essentially normal with stable post-op changes in the  left breast.  Follow up in one year was recommended.    • 12/28/16 - Anemia work up revealed reticulocyte count (N), ferritin (L) 4, folate > 24, haptoglobin (N) 145, , iron binding capacity (H) 555.  SPEP with immunofixation assay did not reveal any monoclonal protein.  B12 (N) 353.  Methylmalonic acid level (N) 244.    • 1/11/16 - Urinalysis done showed small blood.  Urine culture did not grow any significant organisms.     • 6/16/17 - Bilateral mammogram with tomosynthesis was a stable exam without any abnormalities detected.    • 8/23/17 - Chemistry panel:  BUN 13, creatinine 0.7, liver function tests (N).  Estradiol level < 20.  FHS 31.5, also in the postmenopausal range.    • December 2017 - Patient received Injectafer for iron deficiency anemia.  Received 750 mg Injectafer day 1 and day 8.  • December 2017 - Ferritin 10.    • 12/13/17 - Tamoxifen discontinued.  Arimidex 1 mg daily and Os-Brenden D were initiated.    • 1/24/18 - DEXA scan, normal.    • 6/19/18 - Patient had bilateral diagnostic mammogram with tomosynthesis.  No mammographic evidence of malignancy.  Follow up in one year was recommended.    • 8/8/18 - Ferritin level was 227.  BUN 13.  Creatinine 0.6.   • 6/14/2019 patient had bilateral diagnostic mammogram with tomosynthesis there is no mammographic evidence of malignancy follow-up in 1 year was recommended.  • 2/4/2020-patient had a DEXA scan which showed a normal bone density  • February 2020 patient had anemia work-up which showed persistent iron deficiency with a ferritin level of 4.  Patient was referred back to [facility] and is to receive  IV Injectafer but due to the pandemic she canceled her appointments  • 5/18/2020 patient had bilateral screening mammogram, there was no mammographic evidence of malignancy.  Follow-up in 1 year was recommended  • HPI has been reviewed      Past Medical History:   Diagnosis Date   • Adenomatous colon polyp    • Allergic rhinitis    • Asthma    • Breast  cancer (CMS/HCC)    • Cholelithiasis    • Constipation    • Duodenal ulcer    • Erosive gastritis    • Hiatal hernia    • History of breast cancer    • Hypothyroidism    • Iron deficiency anemia    • Microscopic hematuria    • SELMA (stress urinary incontinence, female)    • Thyroid nodule        Past Surgical History:   Procedure Laterality Date   • AUGMENTATION MAMMAPLASTY  1989   • BREAST LUMPECTOMY Left 2014   • BREAST RECONSTRUCTION Left 2014   • ENDOSCOPY     • SENTINEL LYMPH NODE BIOPSY           Current Outpatient Medications:   •  albuterol sulfate  (90 Base) MCG/ACT inhaler, Inhale 2 puffs., Disp: , Rfl:   •  anastrozole (ARIMIDEX) 1 MG tablet, TAKE 1 TABLET DAILY, Disp: 90 tablet, Rfl: 3  •  Calcium Carbonate-Vitamin D (CALCIUM 600+D) 600-200 MG-UNIT tablet, 2 (Two) Times a Day., Disp: , Rfl:   •  ferrous sulfate 325 (65 FE) MG tablet, Take 325 mg by mouth Daily With Breakfast., Disp: , Rfl:   •  loratadine (CLARITIN) 10 MG tablet, CLARITIN 10 MG TABS, Disp: , Rfl:   •  Multiple Vitamin (MULTI-VITAMIN) tablet, MULTI-VITAMIN TABS, Disp: , Rfl:   •  pantoprazole (PROTONIX) 40 MG EC tablet, TAKE 1 TABLET DAILY, Disp: 90 tablet, Rfl: 3    No Known Allergies    Family History   Problem Relation Age of Onset   • Kidney cancer Father    • Prostate cancer Father    • Breast cancer Maternal Aunt    • Prostate cancer Paternal Aunt    • Lung cancer Paternal Uncle    • Diabetes Mother    • Hypertension Mother    • Hyperlipidemia Mother    • Heart disease Other         Grandparents   • Breast cancer Cousin        Cancer-related family history includes Breast cancer in her cousin and maternal aunt; Kidney cancer in her father; Lung cancer in her paternal uncle; Prostate cancer in her father and paternal aunt.    Social History     Tobacco Use   • Smoking status: Never Smoker   • Smokeless tobacco: Never Used   Substance Use Topics   • Alcohol use: No   • Drug use: No     I have reviewed and confirmed the accuracy  "of the patient's history: Chief complaint, HPI and ROS as entered by the MA/RADHAN/RN. Courtney Bautista MD 04/23/21       SUBJECTIVE:    The patient is here for a follow up appointment.  Does not have any new issues today        REVIEW OF SYSTEMS:  Review of Systems   Constitutional: Negative for chills and fever.   HENT: Negative for ear pain, mouth sores, nosebleeds and sore throat.    Eyes: Negative for photophobia and visual disturbance.   Respiratory: Negative for wheezing and stridor.    Cardiovascular: Negative for chest pain and palpitations.   Gastrointestinal: Negative for abdominal pain, diarrhea, nausea and vomiting.   Endocrine: Negative for cold intolerance and heat intolerance.   Genitourinary: Negative for dysuria and hematuria.   Musculoskeletal: Negative for joint swelling and neck stiffness.   Skin: Negative for color change and rash.   Neurological: Negative for seizures and syncope.   Hematological: Negative for adenopathy.        No obvious bleeding   Psychiatric/Behavioral: Negative for agitation, confusion and hallucinations.     I have reviewed and confirmed the accuracy of the ROS as documented by the MA/LPN/RN Courtney Bautista MD    OBJECTIVE:    Vitals:    04/23/21 1028   BP: 161/90   Pulse: 83   Resp: 18   Temp: 97.7 °F (36.5 °C)   TempSrc: Temporal   Weight: 81.6 kg (180 lb)   Height: 165.1 cm (65\")   PainSc: 0-No pain       ECOG  (0) Fully active, able to carry on all predisease performance without restriction    Physical Exam   Constitutional: She is oriented to person, place, and time. No distress.   HENT:   Head: Normocephalic and atraumatic.   Eyes: Conjunctivae are normal. Right eye exhibits no discharge. Left eye exhibits no discharge. No scleral icterus.   Neck: No thyromegaly present.   Cardiovascular: Normal rate, regular rhythm and normal heart sounds. Exam reveals no gallop and no friction rub.   Pulmonary/Chest: Effort normal. No stridor. No respiratory distress. " She has no wheezes. Right breast exhibits tenderness. Right breast exhibits no inverted nipple, no mass, no nipple discharge and no skin change. Left breast exhibits no inverted nipple, no mass, no nipple discharge, no skin change and no tenderness. No breast bleeding. Breasts are symmetrical.   Bilateral breast exam was negative  Bilateral axillary exam was negative   Abdominal: Soft. Bowel sounds are normal. She exhibits no mass. There is no abdominal tenderness. There is no rebound and no guarding.   Musculoskeletal: Normal range of motion. No tenderness.   Lymphadenopathy:     She has no cervical adenopathy.   Neurological: She is alert and oriented to person, place, and time. She exhibits normal muscle tone.   Skin: Skin is warm. No rash noted. She is not diaphoretic. No erythema.   Psychiatric: Her behavior is normal.   Nursing note and vitals reviewed.    Physical exam as documented above  I have reexamined the patient and the results are consistent with the previously documented exam. Courtneyele Bautista MD     RECENT LABS  WBC   Date Value Ref Range Status   04/23/2021 8.07 3.40 - 10.80 10*3/mm3 Final   10/27/2020 9.0 3.4 - 10.8 x10E3/uL Final     RBC   Date Value Ref Range Status   04/23/2021 4.50 3.77 - 5.28 10*6/mm3 Final   10/27/2020 4.52 3.77 - 5.28 x10E6/uL Final     Hemoglobin   Date Value Ref Range Status   04/23/2021 13.5 12.0 - 15.9 g/dL Final     Hematocrit   Date Value Ref Range Status   04/23/2021 41.7 34.0 - 46.6 % Final     MCV   Date Value Ref Range Status   04/23/2021 92.7 79.0 - 97.0 fL Final     MCH   Date Value Ref Range Status   04/23/2021 30.0 26.6 - 33.0 pg Final     MCHC   Date Value Ref Range Status   04/23/2021 32.4 31.5 - 35.7 g/dL Final     RDW   Date Value Ref Range Status   04/23/2021 13.1 12.3 - 15.4 % Final     RDW-SD   Date Value Ref Range Status   04/23/2021 43.2 37.0 - 54.0 fl Final     MPV   Date Value Ref Range Status   04/23/2021 9.6 6.0 - 12.0 fL Final     Platelets    Date Value Ref Range Status   04/23/2021 304 140 - 450 10*3/mm3 Final     Neutrophil %   Date Value Ref Range Status   04/23/2021 60.3 42.7 - 76.0 % Final     Lymphocyte %   Date Value Ref Range Status   04/23/2021 25.8 19.6 - 45.3 % Final     Monocyte %   Date Value Ref Range Status   04/23/2021 9.9 5.0 - 12.0 % Final     Eosinophil %   Date Value Ref Range Status   04/23/2021 3.6 0.3 - 6.2 % Final     Basophil %   Date Value Ref Range Status   04/23/2021 0.4 0.0 - 1.5 % Final     Neutrophils, Absolute   Date Value Ref Range Status   04/23/2021 4.87 1.70 - 7.00 10*3/mm3 Final     Lymphocytes, Absolute   Date Value Ref Range Status   04/23/2021 2.08 0.70 - 3.10 10*3/mm3 Final     Monocytes, Absolute   Date Value Ref Range Status   04/23/2021 0.80 0.10 - 0.90 10*3/mm3 Final     Eosinophils, Absolute   Date Value Ref Range Status   04/23/2021 0.29 0.00 - 0.40 10*3/mm3 Final     Basophils, Absolute   Date Value Ref Range Status   04/23/2021 0.03 0.00 - 0.20 10*3/mm3 Final       Lab Results   Component Value Date    GLUCOSE 125 (H) 02/12/2020    BUN 12 02/19/2021    CREATININE 0.71 02/19/2021    EGFRIFNONA 96 02/19/2021    EGFRIFAFRI 110 02/19/2021    BCR 17 02/19/2021    K 4.4 02/19/2021    CO2 26 02/19/2021    CALCIUM 10.3 (H) 02/19/2021    PROTENTOTREF 7.3 02/19/2021    ALBUMIN 4.4 02/19/2021    LABIL2 1.5 02/19/2021    AST 16 02/19/2021    ALT 15 02/19/2021         ASSESSMENT:  1. Invasive ductal carcinoma involving the left breast, status post left excisional biopsy with sentinel lymph node biopsy, ER positive, VT positive, HER-2/jamir negative for T1c N0 M0.  Ongoing surveillance  2. She has completed accelerated partial breast radiation under the care of Dr. Pickett.  3. Low recurrence score on Oncotype DX assay.     4. Family history of three relatives with breast cancer including patient, worrisome for hereditary breast cancer syndrome.   5. Negative for deleterious mutation, but patient was found to have  variant of unknown significance in the APC and BARD-1 genes.  6. Status post EGD which showed erosive gastritis, Manuel erosions and duodenal ulcer in February 2016.  Status post colonoscopy which showed tubular adenoma in February 2016.  Scheduled for repeat EGD colonoscopy January 2021 by Dr. Daniels  7. Long history of hematuria.  Blows up with urology  8. Status post Tamoxifen 12/22/14 to 12/13/17.  Arimidex initiated 12/13/17.  Monitoring for side effects  9. Recurrent anemia due to iron deficiency  10. Assessment has been reviewed and updated    PLANS:    She has discontinued oral iron  She will continue with Arimidex and Os-Brenden D.  I have reviewed this with patient    She is due for bilateral diagnostic mammogram in May 2021.  We will schedule today    Continue monthly breast self-exam and call for lumps, nipple discharge, skin discoloration or breast pain.  She will continue Arimidex and Os-Brenden D.  Reviewed  CBC reviewed  CMP ordered    Bone density is due 2/4/22       I have reviewed labs results, imaging, vitals, and medications with the patient today. Will follow up in 6 months with me.         Patient verbalized understanding and is in agreement of the above plan.

## 2021-06-08 ENCOUNTER — HOSPITAL ENCOUNTER (OUTPATIENT)
Dept: MAMMOGRAPHY | Facility: HOSPITAL | Age: 56
Discharge: HOME | End: 2021-06-08
Admitting: INTERNAL MEDICINE

## 2021-06-08 DIAGNOSIS — Z17.0 MALIGNANT NEOPLASM OF BREAST IN FEMALE, ESTROGEN RECEPTOR POSITIVE, UNSPECIFIED LATERALITY, UNSPECIFIED SITE OF BREAST (HCC): ICD-10-CM

## 2021-06-08 DIAGNOSIS — C50.919 MALIGNANT NEOPLASM OF BREAST IN FEMALE, ESTROGEN RECEPTOR POSITIVE, UNSPECIFIED LATERALITY, UNSPECIFIED SITE OF BREAST (HCC): ICD-10-CM

## 2021-06-08 DIAGNOSIS — D50.0 IRON DEFICIENCY ANEMIA DUE TO CHRONIC BLOOD LOSS: ICD-10-CM

## 2021-06-08 PROCEDURE — 77066 DX MAMMO INCL CAD BI: CPT

## 2021-06-08 PROCEDURE — G0279 TOMOSYNTHESIS, MAMMO: HCPCS

## 2021-09-13 RX ORDER — PANTOPRAZOLE SODIUM 40 MG/1
TABLET, DELAYED RELEASE ORAL
Qty: 90 TABLET | Refills: 3 | Status: SHIPPED | OUTPATIENT
Start: 2021-09-13 | End: 2022-09-06

## 2021-10-25 ENCOUNTER — APPOINTMENT (OUTPATIENT)
Dept: LAB | Facility: HOSPITAL | Age: 56
End: 2021-10-25

## 2021-10-29 ENCOUNTER — LAB (OUTPATIENT)
Dept: LAB | Facility: HOSPITAL | Age: 56
End: 2021-10-29

## 2021-10-29 ENCOUNTER — OFFICE VISIT (OUTPATIENT)
Dept: ONCOLOGY | Facility: CLINIC | Age: 56
End: 2021-10-29

## 2021-10-29 VITALS
HEART RATE: 84 BPM | RESPIRATION RATE: 18 BRPM | DIASTOLIC BLOOD PRESSURE: 100 MMHG | SYSTOLIC BLOOD PRESSURE: 166 MMHG | HEIGHT: 65 IN | WEIGHT: 181 LBS | BODY MASS INDEX: 30.16 KG/M2 | TEMPERATURE: 97.1 F

## 2021-10-29 DIAGNOSIS — C50.919 MALIGNANT NEOPLASM OF BREAST IN FEMALE, ESTROGEN RECEPTOR POSITIVE, UNSPECIFIED LATERALITY, UNSPECIFIED SITE OF BREAST (HCC): Primary | ICD-10-CM

## 2021-10-29 DIAGNOSIS — Z78.0 POST-MENOPAUSAL: ICD-10-CM

## 2021-10-29 DIAGNOSIS — Z17.0 MALIGNANT NEOPLASM OF BREAST IN FEMALE, ESTROGEN RECEPTOR POSITIVE, UNSPECIFIED LATERALITY, UNSPECIFIED SITE OF BREAST (HCC): Primary | ICD-10-CM

## 2021-10-29 DIAGNOSIS — D50.0 IRON DEFICIENCY ANEMIA DUE TO CHRONIC BLOOD LOSS: ICD-10-CM

## 2021-10-29 LAB
ALBUMIN SERPL-MCNC: 4.3 G/DL (ref 3.5–5.2)
ALBUMIN/GLOB SERPL: 1.4 G/DL
ALP SERPL-CCNC: 119 U/L (ref 39–117)
ALT SERPL W P-5'-P-CCNC: 13 U/L (ref 1–33)
ANION GAP SERPL CALCULATED.3IONS-SCNC: 13 MMOL/L (ref 5–15)
AST SERPL-CCNC: 15 U/L (ref 1–32)
BASOPHILS # BLD AUTO: 0.03 10*3/MM3 (ref 0–0.2)
BASOPHILS NFR BLD AUTO: 0.3 % (ref 0–1.5)
BILIRUB SERPL-MCNC: 0.5 MG/DL (ref 0–1.2)
BUN SERPL-MCNC: 15 MG/DL (ref 6–20)
BUN/CREAT SERPL: 24.6 (ref 7–25)
CALCIUM SPEC-SCNC: 10 MG/DL (ref 8.6–10.5)
CHLORIDE SERPL-SCNC: 102 MMOL/L (ref 98–107)
CO2 SERPL-SCNC: 25 MMOL/L (ref 22–29)
CREAT SERPL-MCNC: 0.61 MG/DL (ref 0.57–1)
DEPRECATED RDW RBC AUTO: 41.5 FL (ref 37–54)
EOSINOPHIL # BLD AUTO: 0.22 10*3/MM3 (ref 0–0.4)
EOSINOPHIL NFR BLD AUTO: 2.4 % (ref 0.3–6.2)
ERYTHROCYTE [DISTWIDTH] IN BLOOD BY AUTOMATED COUNT: 12.6 % (ref 12.3–15.4)
GFR SERPL CREATININE-BSD FRML MDRD: 101 ML/MIN/1.73
GLOBULIN UR ELPH-MCNC: 3.1 GM/DL
GLUCOSE SERPL-MCNC: 92 MG/DL (ref 65–99)
HCT VFR BLD AUTO: 40.6 % (ref 34–46.6)
HGB BLD-MCNC: 13.1 G/DL (ref 12–15.9)
HOLD SPECIMEN: NORMAL
HOLD SPECIMEN: NORMAL
LYMPHOCYTES # BLD AUTO: 2.17 10*3/MM3 (ref 0.7–3.1)
LYMPHOCYTES NFR BLD AUTO: 23.3 % (ref 19.6–45.3)
MCH RBC QN AUTO: 29.8 PG (ref 26.6–33)
MCHC RBC AUTO-ENTMCNC: 32.3 G/DL (ref 31.5–35.7)
MCV RBC AUTO: 92.3 FL (ref 79–97)
MONOCYTES # BLD AUTO: 0.9 10*3/MM3 (ref 0.1–0.9)
MONOCYTES NFR BLD AUTO: 9.6 % (ref 5–12)
NEUTROPHILS NFR BLD AUTO: 6.01 10*3/MM3 (ref 1.7–7)
NEUTROPHILS NFR BLD AUTO: 64.4 % (ref 42.7–76)
PLATELET # BLD AUTO: 323 10*3/MM3 (ref 140–450)
PMV BLD AUTO: 9.6 FL (ref 6–12)
POTASSIUM SERPL-SCNC: 4.1 MMOL/L (ref 3.5–5.2)
PROT SERPL-MCNC: 7.4 G/DL (ref 6–8.5)
RBC # BLD AUTO: 4.4 10*6/MM3 (ref 3.77–5.28)
SODIUM SERPL-SCNC: 140 MMOL/L (ref 136–145)
WBC # BLD AUTO: 9.33 10*3/MM3 (ref 3.4–10.8)

## 2021-10-29 PROCEDURE — 36415 COLL VENOUS BLD VENIPUNCTURE: CPT

## 2021-10-29 PROCEDURE — 80053 COMPREHEN METABOLIC PANEL: CPT

## 2021-10-29 PROCEDURE — 99214 OFFICE O/P EST MOD 30 MIN: CPT | Performed by: INTERNAL MEDICINE

## 2021-10-29 PROCEDURE — 85025 COMPLETE CBC W/AUTO DIFF WBC: CPT

## 2021-10-29 NOTE — PROGRESS NOTES
Hematology/Oncology Outpatient Follow Up    PATIENT NAME:Lisseth Linder  :1965  MRN: [id number]  PRIMARY CARE PHYSICIAN: Kari Lou APRN  REFERRING PHYSICIAN: Kari Lou APRN    Chief Complaint   Patient presents with   • Follow-up     Malignant neoplasm of breast in female, estrogen receptor positive        HISTORY OF PRESENT ILLNESS:     This is a 54-year-old female who was originally seen on 10/6/14.  • Patient’s original screening mammogram, not available.   • 14, she underwent needle localized excisional biopsy of the left breast mass and sentinel lymph node biopsy.  Tumor measured 1.1 cm, negative surgical margins.  ER positive, NM positive, HER-2/jamir negative.  • 14 - She had Oncotype DX assay performed on the tumor, which returned with a recurrent score of 10, which corresponds to 7% risk of distant recurrence following five years of Tamoxifen over a course of 10 years.  This places patient at low-risk disease.  ER was positive, NM positive and HER-2/jamir was negative on the validation assay.    • 14 - Patient completed accelerated breast radiation under the care of Dr. Pickett.  • 14 - Patient had a comprehensive genetic analysis with [facility].  This returned with no significant mutation identified, but she was found to have variant of unknown significance in the APC and BARD-1 genes.      • 14 - Patient initiated on Tamoxifen 20 mg daily.   • 6/8/15 - Bilateral digital diagnostic mammogram which showed fatty breasts and postsurgical changes on the left breast, otherwise negative.  Follow up in one year was recommended.  She also had an ultrasound of the left breast which did not show any abnormalities.   • 2/10/16 - EGD and colonoscopy were done.  On the EGD she was found to have erosive gastritis, Manuel erosions and a duodenal ulcer.  Colonoscopy showed tubular adenoma.   • 16 - Follow up mammogram was essentially normal with stable post-op changes in the  left breast.  Follow up in one year was recommended.    • 12/28/16 - Anemia work up revealed reticulocyte count (N), ferritin (L) 4, folate > 24, haptoglobin (N) 145, , iron binding capacity (H) 555.  SPEP with immunofixation assay did not reveal any monoclonal protein.  B12 (N) 353.  Methylmalonic acid level (N) 244.    • 1/11/16 - Urinalysis done showed small blood.  Urine culture did not grow any significant organisms.     • 6/16/17 - Bilateral mammogram with tomosynthesis was a stable exam without any abnormalities detected.    • 8/23/17 - Chemistry panel:  BUN 13, creatinine 0.7, liver function tests (N).  Estradiol level < 20.  FHS 31.5, also in the postmenopausal range.    • December 2017 - Patient received Injectafer for iron deficiency anemia.  Received 750 mg Injectafer day 1 and day 8.  • December 2017 - Ferritin 10.    • 12/13/17 - Tamoxifen discontinued.  Arimidex 1 mg daily and Os-Brenden D were initiated.    • 1/24/18 - DEXA scan, normal.    • 6/19/18 - Patient had bilateral diagnostic mammogram with tomosynthesis.  No mammographic evidence of malignancy.  Follow up in one year was recommended.    • 8/8/18 - Ferritin level was 227.  BUN 13.  Creatinine 0.6.   • 6/14/2019 patient had bilateral diagnostic mammogram with tomosynthesis there is no mammographic evidence of malignancy follow-up in 1 year was recommended.  • 2/4/2020-patient had a DEXA scan which showed a normal bone density  • February 2020 patient had anemia work-up which showed persistent iron deficiency with a ferritin level of 4.  Patient was referred back to [facility] and is to receive  IV Injectafer but due to the pandemic she canceled her appointments  • 5/18/2020 patient had bilateral screening mammogram, there was no mammographic evidence of malignancy.  Follow-up in 1 year was recommended  • HPI has been reviewed      Past Medical History:   Diagnosis Date   • Adenomatous colon polyp    • Allergic rhinitis    • Asthma    • Breast  cancer (HCC)    • Cholelithiasis    • Constipation    • Duodenal ulcer    • Erosive gastritis    • Hiatal hernia    • History of breast cancer    • Hypothyroidism    • Iron deficiency anemia    • Microscopic hematuria    • SELMA (stress urinary incontinence, female)    • Thyroid nodule        Past Surgical History:   Procedure Laterality Date   • AUGMENTATION MAMMAPLASTY  1989   • BREAST LUMPECTOMY Left 2014   • BREAST RECONSTRUCTION Left 2014   • ENDOSCOPY     • SENTINEL LYMPH NODE BIOPSY           Current Outpatient Medications:   •  albuterol sulfate  (90 Base) MCG/ACT inhaler, Inhale 2 puffs., Disp: , Rfl:   •  anastrozole (ARIMIDEX) 1 MG tablet, TAKE 1 TABLET DAILY, Disp: 90 tablet, Rfl: 3  •  Calcium Carbonate-Vitamin D (CALCIUM 600+D) 600-200 MG-UNIT tablet, 2 (Two) Times a Day., Disp: , Rfl:   •  ferrous sulfate 325 (65 FE) MG tablet, Take 325 mg by mouth Daily With Breakfast., Disp: , Rfl:   •  loratadine (CLARITIN) 10 MG tablet, CLARITIN 10 MG TABS, Disp: , Rfl:   •  Multiple Vitamin (MULTI-VITAMIN) tablet, MULTI-VITAMIN TABS, Disp: , Rfl:   •  pantoprazole (PROTONIX) 40 MG EC tablet, TAKE 1 TABLET DAILY, Disp: 90 tablet, Rfl: 3    No Known Allergies    Family History   Problem Relation Age of Onset   • Kidney cancer Father    • Prostate cancer Father    • Breast cancer Maternal Aunt    • Prostate cancer Paternal Aunt    • Lung cancer Paternal Uncle    • Diabetes Mother    • Hypertension Mother    • Hyperlipidemia Mother    • Heart disease Other         Grandparents   • Breast cancer Cousin        Cancer-related family history includes Breast cancer in her cousin and maternal aunt; Kidney cancer in her father; Lung cancer in her paternal uncle; Prostate cancer in her father and paternal aunt.    Social History     Tobacco Use   • Smoking status: Never Smoker   • Smokeless tobacco: Never Used   Substance Use Topics   • Alcohol use: No   • Drug use: No     I have reviewed and confirmed the accuracy of  "the patient's history: Chief complaint, HPI and ROS as entered by the MA/LPN/RN. Courtney Bautista MD 10/29/21       SUBJECTIVE:    The patient is here for a follow up appointment.  Does not have any new issues today.  She denies any breast specific complaints today        REVIEW OF SYSTEMS:    Review of Systems   Constitutional: Negative for chills and fever.   HENT: Negative for ear pain, mouth sores, nosebleeds and sore throat.    Eyes: Negative for photophobia and visual disturbance.   Respiratory: Negative for wheezing and stridor.    Cardiovascular: Negative for chest pain and palpitations.   Gastrointestinal: Negative for abdominal pain, diarrhea, nausea and vomiting.   Endocrine: Negative for cold intolerance and heat intolerance.   Genitourinary: Negative for dysuria and hematuria.   Musculoskeletal: Negative for joint swelling and neck stiffness.   Skin: Negative for color change and rash.   Neurological: Negative for seizures and syncope.   Hematological: Negative for adenopathy.        No obvious bleeding   Psychiatric/Behavioral: Negative for agitation, confusion and hallucinations.     I have reviewed and confirmed the accuracy of the ROS as documented by the MA/LPN/RN Courtney Bautista MD    OBJECTIVE:    Vitals:    10/29/21 0903   BP: 166/100   Pulse: 84   Resp: 18   Temp: 97.1 °F (36.2 °C)   TempSrc: Infrared   Weight: 82.1 kg (181 lb)   Height: 165.1 cm (65\")   PainSc: 0-No pain       ECOG  (0) Fully active, able to carry on all predisease performance without restriction    Physical Exam   Constitutional: She is oriented to person, place, and time. No distress.   HENT:   Head: Normocephalic and atraumatic.   Eyes: Conjunctivae are normal. Right eye exhibits no discharge. Left eye exhibits no discharge. No scleral icterus.   Neck: No thyromegaly present.   Cardiovascular: Normal rate, regular rhythm and normal heart sounds. Exam reveals no gallop and no friction rub.   Pulmonary/Chest: " Effort normal. No stridor. No respiratory distress. She has no wheezes. Right breast exhibits tenderness. Right breast exhibits no inverted nipple, no mass, no nipple discharge and no skin change. Left breast exhibits no inverted nipple, no mass, no nipple discharge, no skin change and no tenderness. No breast bleeding. Breasts are symmetrical.   Bilateral breast exam was negative  Bilateral axillary exam was negative   Abdominal: Soft. Bowel sounds are normal. She exhibits no mass. There is no abdominal tenderness. There is no rebound and no guarding.   Musculoskeletal: Normal range of motion. No tenderness.   Lymphadenopathy:     She has no cervical adenopathy.   Neurological: She is alert and oriented to person, place, and time. She exhibits normal muscle tone.   Skin: Skin is warm. No rash noted. She is not diaphoretic. No erythema.   Psychiatric: Her behavior is normal.   Nursing note and vitals reviewed.    Physical exam as documented above  I have reexamined the patient and the results are consistent with the previously documented exam. Courtney Kierra Bautista MD     RECENT LABS    WBC   Date Value Ref Range Status   10/29/2021 9.33 3.40 - 10.80 10*3/mm3 Final   10/27/2020 9.0 3.4 - 10.8 x10E3/uL Final     RBC   Date Value Ref Range Status   10/29/2021 4.40 3.77 - 5.28 10*6/mm3 Final   10/27/2020 4.52 3.77 - 5.28 x10E6/uL Final     Hemoglobin   Date Value Ref Range Status   10/29/2021 13.1 12.0 - 15.9 g/dL Final     Hematocrit   Date Value Ref Range Status   10/29/2021 40.6 34.0 - 46.6 % Final     MCV   Date Value Ref Range Status   10/29/2021 92.3 79.0 - 97.0 fL Final     MCH   Date Value Ref Range Status   10/29/2021 29.8 26.6 - 33.0 pg Final     MCHC   Date Value Ref Range Status   10/29/2021 32.3 31.5 - 35.7 g/dL Final     RDW   Date Value Ref Range Status   10/29/2021 12.6 12.3 - 15.4 % Final     RDW-SD   Date Value Ref Range Status   10/29/2021 41.5 37.0 - 54.0 fl Final     MPV   Date Value Ref Range  Status   10/29/2021 9.6 6.0 - 12.0 fL Final     Platelets   Date Value Ref Range Status   10/29/2021 323 140 - 450 10*3/mm3 Final     Neutrophil %   Date Value Ref Range Status   10/29/2021 64.4 42.7 - 76.0 % Final     Lymphocyte %   Date Value Ref Range Status   10/29/2021 23.3 19.6 - 45.3 % Final     Monocyte %   Date Value Ref Range Status   10/29/2021 9.6 5.0 - 12.0 % Final     Eosinophil %   Date Value Ref Range Status   10/29/2021 2.4 0.3 - 6.2 % Final     Basophil %   Date Value Ref Range Status   10/29/2021 0.3 0.0 - 1.5 % Final     Neutrophils, Absolute   Date Value Ref Range Status   10/29/2021 6.01 1.70 - 7.00 10*3/mm3 Final     Lymphocytes, Absolute   Date Value Ref Range Status   10/29/2021 2.17 0.70 - 3.10 10*3/mm3 Final     Monocytes, Absolute   Date Value Ref Range Status   10/29/2021 0.90 0.10 - 0.90 10*3/mm3 Final     Eosinophils, Absolute   Date Value Ref Range Status   10/29/2021 0.22 0.00 - 0.40 10*3/mm3 Final     Basophils, Absolute   Date Value Ref Range Status   10/29/2021 0.03 0.00 - 0.20 10*3/mm3 Final       Lab Results   Component Value Date    GLUCOSE 92 10/29/2021    BUN 15 10/29/2021    CREATININE 0.61 10/29/2021    EGFRIFNONA 101 10/29/2021    EGFRIFAFRI 110 02/19/2021    BCR 24.6 10/29/2021    K 4.1 10/29/2021    CO2 25.0 10/29/2021    CALCIUM 10.0 10/29/2021    PROTENTOTREF 7.3 02/19/2021    ALBUMIN 4.30 10/29/2021    LABIL2 1.5 02/19/2021    AST 15 10/29/2021    ALT 13 10/29/2021         ASSESSMENT:    1. Invasive ductal carcinoma involving the left breast, status post left excisional biopsy with sentinel lymph node biopsy, ER positive, AR positive, HER-2/jamir negative for T1c N0 M0.  Ongoing surveillance  2. She has completed accelerated partial breast radiation under the care of Dr. Pickett.  3. Low recurrence score on Oncotype DX assay.     4. Family history of three relatives with breast cancer including patient, worrisome for hereditary breast cancer syndrome.   5. Negative for  deleterious mutation, but patient was found to have variant of unknown significance in the APC and BARD-1 genes.  6. Status post EGD which showed erosive gastritis, Manuel erosions and duodenal ulcer in February 2016.  Status post colonoscopy which showed tubular adenoma in February 2016.  Scheduled for repeat EGD colonoscopy January 2021 by Dr. Daniels  7. Long history of hematuria.  Blows up with urology  8. Status post Tamoxifen 12/22/14 to 12/13/17.  Arimidex initiated 12/13/17.  Monitoring for side effects  9. Recurrent anemia due to iron deficiency  10. Assessment has been reviewed and updated    PLANS:    She has discontinued oral iron  Her hemoglobin is normal  Check CMP today  Schedule patient for bone density due February 2022  She will continue with Arimidex and Os-Brenden D.  I have reviewed this with patient    Her bilateral diagnostic mammogram will be due June 2022    Continue monthly breast self-exam and call for lumps, nipple discharge, skin discoloration or breast pain.  She will continue Arimidex and Os-Brenden D.  Reviewed  CBC reviewed  CMP ordered           I have reviewed labs results, imaging, vitals, and medications with the patient today. Will follow up in 6 months with me.         Patient verbalized understanding and is in agreement of the above plan.

## 2021-11-16 ENCOUNTER — TELEPHONE (OUTPATIENT)
Dept: FAMILY MEDICINE CLINIC | Facility: CLINIC | Age: 56
End: 2021-11-16

## 2021-11-16 DIAGNOSIS — E04.1 THYROID NODULE: Primary | ICD-10-CM

## 2021-11-16 NOTE — TELEPHONE ENCOUNTER
----- Message from Maureen Vargas sent at 12/18/2020  9:50 AM EST -----  Regarding: FW: Thyroid Ultrasoun    ----- Message -----  From: Arlet Ren MA  Sent: 11/10/2020  11:59 AM EST  To: [facility]  Subject: Thyroid Ultrasoun                                Thyroid Ultrasound.

## 2021-12-22 ENCOUNTER — OFFICE VISIT (OUTPATIENT)
Dept: FAMILY MEDICINE CLINIC | Facility: CLINIC | Age: 56
End: 2021-12-22

## 2021-12-22 VITALS
TEMPERATURE: 97.4 F | DIASTOLIC BLOOD PRESSURE: 92 MMHG | RESPIRATION RATE: 18 BRPM | BODY MASS INDEX: 29.82 KG/M2 | HEIGHT: 65 IN | OXYGEN SATURATION: 98 % | HEART RATE: 90 BPM | SYSTOLIC BLOOD PRESSURE: 148 MMHG | WEIGHT: 179 LBS

## 2021-12-22 DIAGNOSIS — F41.9 ANXIETY: ICD-10-CM

## 2021-12-22 DIAGNOSIS — E05.90 HYPERTHYROIDISM: ICD-10-CM

## 2021-12-22 DIAGNOSIS — R31.1 BENIGN ESSENTIAL MICROSCOPIC HEMATURIA: ICD-10-CM

## 2021-12-22 DIAGNOSIS — Z12.4 SCREENING FOR CERVICAL CANCER: ICD-10-CM

## 2021-12-22 DIAGNOSIS — B35.1 ONYCHOMYCOSIS OF LEFT GREAT TOE: ICD-10-CM

## 2021-12-22 DIAGNOSIS — E04.1 THYROID NODULE: ICD-10-CM

## 2021-12-22 DIAGNOSIS — D50.0 IRON DEFICIENCY ANEMIA DUE TO CHRONIC BLOOD LOSS: ICD-10-CM

## 2021-12-22 DIAGNOSIS — K44.9 HIATAL HERNIA: ICD-10-CM

## 2021-12-22 DIAGNOSIS — Z11.59 NEED FOR HEPATITIS C SCREENING TEST: ICD-10-CM

## 2021-12-22 DIAGNOSIS — J30.1 SEASONAL ALLERGIC RHINITIS DUE TO POLLEN: ICD-10-CM

## 2021-12-22 DIAGNOSIS — I10 PRIMARY HYPERTENSION: ICD-10-CM

## 2021-12-22 DIAGNOSIS — Z11.4 SCREENING FOR HIV (HUMAN IMMUNODEFICIENCY VIRUS): ICD-10-CM

## 2021-12-22 DIAGNOSIS — J45.20 MILD INTERMITTENT ASTHMA WITHOUT COMPLICATION: ICD-10-CM

## 2021-12-22 DIAGNOSIS — K80.20 CALCULUS OF GALLBLADDER WITHOUT CHOLECYSTITIS WITHOUT OBSTRUCTION: ICD-10-CM

## 2021-12-22 DIAGNOSIS — Z85.3 HISTORY OF BREAST CANCER: ICD-10-CM

## 2021-12-22 DIAGNOSIS — Z00.00 PREVENTATIVE HEALTH CARE: Primary | ICD-10-CM

## 2021-12-22 PROBLEM — R79.89 ABNORMAL SERUM THYROID STIMULATING HORMONE (TSH) LEVEL: Status: RESOLVED | Noted: 2018-10-17 | Resolved: 2021-12-22

## 2021-12-22 LAB
BILIRUB BLD-MCNC: NEGATIVE MG/DL
CLARITY, POC: CLEAR
COLOR UR: YELLOW
EXPIRATION DATE: ABNORMAL
GLUCOSE UR STRIP-MCNC: NEGATIVE MG/DL
KETONES UR QL: NEGATIVE
LEUKOCYTE EST, POC: NEGATIVE
Lab: ABNORMAL
NITRITE UR-MCNC: NEGATIVE MG/ML
PH UR: 6.5 [PH] (ref 5–8)
PROT UR STRIP-MCNC: ABNORMAL MG/DL
RBC # UR STRIP: ABNORMAL /UL
SP GR UR: 1.03 (ref 1–1.03)
UROBILINOGEN UR QL: NORMAL

## 2021-12-22 PROCEDURE — 81003 URINALYSIS AUTO W/O SCOPE: CPT | Performed by: NURSE PRACTITIONER

## 2021-12-22 PROCEDURE — 99214 OFFICE O/P EST MOD 30 MIN: CPT | Performed by: NURSE PRACTITIONER

## 2021-12-22 PROCEDURE — 99396 PREV VISIT EST AGE 40-64: CPT | Performed by: NURSE PRACTITIONER

## 2021-12-22 RX ORDER — LOSARTAN POTASSIUM 25 MG/1
25 TABLET ORAL DAILY
Qty: 30 TABLET | Refills: 1 | Status: SHIPPED | OUTPATIENT
Start: 2021-12-22 | End: 2022-01-27 | Stop reason: SDUPTHER

## 2021-12-22 RX ORDER — TERBINAFINE HYDROCHLORIDE 250 MG/1
250 TABLET ORAL DAILY
Qty: 30 TABLET | Refills: 0 | Status: SHIPPED | OUTPATIENT
Start: 2021-12-22 | End: 2022-01-28 | Stop reason: SDUPTHER

## 2021-12-22 NOTE — ASSESSMENT & PLAN NOTE
2020 ultrasound showed multiple thyroid nodules, largest nodules measuring 1 cm and were consistent with Ti-Rads category 4 nodules. A follow-up thyroid ultrasound in 12 months was recommended.  2018 ultrasound showed mild thyroid enlargement with multiple thyroid nodules, the largest was 1.3 x 1.1 cm in the right thyroid lobe.  Findings were consistent with multinodular goiter.  2019 nuclear medicine thyroid imaging with uptake showed normal uptake and no focal lesion.    Recommended repeating thyroid u/s - she is agreeable to this, but wants to wait until next summer to do it.

## 2021-12-22 NOTE — PATIENT INSTRUCTIONS
"https://www.nhlbi.nih.gov/files/docs/public/heart/dash_brief.pdf\">   DASH Eating Plan  DASH stands for Dietary Approaches to Stop Hypertension. The DASH eating plan is a healthy eating plan that has been shown to:  · Reduce high blood pressure (hypertension).  · Reduce your risk for type 2 diabetes, heart disease, and stroke.  · Help with weight loss.  What are tips for following this plan?  Reading food labels  · Check food labels for the amount of salt (sodium) per serving. Choose foods with less than 5 percent of the Daily Value of sodium. Generally, foods with less than 300 milligrams (mg) of sodium per serving fit into this eating plan.  · To find whole grains, look for the word \"whole\" as the first word in the ingredient list.  Shopping  · Buy products labeled as \"low-sodium\" or \"no salt added.\"  · Buy fresh foods. Avoid canned foods and pre-made or frozen meals.  Cooking  · Avoid adding salt when cooking. Use salt-free seasonings or herbs instead of table salt or sea salt. Check with your health care provider or pharmacist before using salt substitutes.  · Do not campos foods. Cook foods using healthy methods such as baking, boiling, grilling, roasting, and broiling instead.  · Cook with heart-healthy oils, such as olive, canola, avocado, soybean, or sunflower oil.  Meal planning    · Eat a balanced diet that includes:  ? 4 or more servings of fruits and 4 or more servings of vegetables each day. Try to fill one-half of your plate with fruits and vegetables.  ? 6-8 servings of whole grains each day.  ? Less than 6 oz (170 g) of lean meat, poultry, or fish each day. A 3-oz (85-g) serving of meat is about the same size as a deck of cards. One egg equals 1 oz (28 g).  ? 2-3 servings of low-fat dairy each day. One serving is 1 cup (237 mL).  ? 1 serving of nuts, seeds, or beans 5 times each week.  ? 2-3 servings of heart-healthy fats. Healthy fats called omega-3 fatty acids are found in foods such as walnuts, " flaxseeds, fortified milks, and eggs. These fats are also found in cold-water fish, such as sardines, salmon, and mackerel.  · Limit how much you eat of:  ? Canned or prepackaged foods.  ? Food that is high in trans fat, such as some fried foods.  ? Food that is high in saturated fat, such as fatty meat.  ? Desserts and other sweets, sugary drinks, and other foods with added sugar.  ? Full-fat dairy products.  · Do not salt foods before eating.  · Do not eat more than 4 egg yolks a week.  · Try to eat at least 2 vegetarian meals a week.  · Eat more home-cooked food and less restaurant, buffet, and fast food.    Lifestyle  · When eating at a restaurant, ask that your food be prepared with less salt or no salt, if possible.  · If you drink alcohol:  ? Limit how much you use to:  § 0-1 drink a day for women who are not pregnant.  § 0-2 drinks a day for men.  ? Be aware of how much alcohol is in your drink. In the [place] one drink equals one 12 oz bottle of beer (355 mL), one 5 oz glass of wine (148 mL), or one 1½ oz glass of hard liquor (44 mL).  General information  · Avoid eating more than 2,300 mg of salt a day. If you have hypertension, you may need to reduce your sodium intake to 1,500 mg a day.  · Work with your health care provider to maintain a healthy body weight or to lose weight. Ask what an ideal weight is for you.  · Get at least 30 minutes of exercise that causes your heart to beat faster (aerobic exercise) most days of the week. Activities may include walking, swimming, or biking.  · Work with your health care provider or dietitian to adjust your eating plan to your individual calorie needs.  What foods should I eat?  Fruits  All fresh, dried, or frozen fruit. Canned fruit in natural juice (without added sugar).  Vegetables  Fresh or frozen vegetables (raw, steamed, roasted, or grilled). Low-sodium or reduced-sodium tomato and vegetable juice. Low-sodium or reduced-sodium tomato sauce and tomato paste.  Low-sodium or reduced-sodium canned vegetables.  Grains  Whole-grain or whole-wheat bread. Whole-grain or whole-wheat pasta. Brown rice. Oatmeal. Quinoa. Bulgur. Whole-grain and low-sodium cereals. Daniela bread. Low-fat, low-sodium crackers. Whole-wheat flour tortillas.  Meats and other proteins  Skinless chicken or turkey. Ground chicken or turkey. Pork with fat trimmed off. Fish and seafood. Egg whites. Dried beans, peas, or lentils. Unsalted nuts, nut butters, and seeds. Unsalted canned beans. Lean cuts of beef with fat trimmed off. Low-sodium, lean precooked or cured meat, such as sausages or meat loaves.  Dairy  Low-fat (1%) or fat-free (skim) milk. Reduced-fat, low-fat, or fat-free cheeses. Nonfat, low-sodium ricotta or cottage cheese. Low-fat or nonfat yogurt. Low-fat, low-sodium cheese.  Fats and oils  Soft margarine without trans fats. Vegetable oil. Reduced-fat, low-fat, or light mayonnaise and salad dressings (reduced-sodium). Canola, safflower, olive, avocado, soybean, and sunflower oils. Avocado.  Seasonings and condiments  Herbs. Spices. Seasoning mixes without salt.  Other foods  Unsalted popcorn and pretzels. Fat-free sweets.  The items listed above may not be a complete list of foods and beverages you can eat. Contact a dietitian for more information.  What foods should I avoid?  Fruits  Canned fruit in a light or heavy syrup. Fried fruit. Fruit in cream or butter sauce.  Vegetables  Creamed or fried vegetables. Vegetables in a cheese sauce. Regular canned vegetables (not low-sodium or reduced-sodium). Regular canned tomato sauce and paste (not low-sodium or reduced-sodium). Regular tomato and vegetable juice (not low-sodium or reduced-sodium). Pickles. Olives.  Grains  Baked goods made with fat, such as croissants, muffins, or some breads. Dry pasta or rice meal packs.  Meats and other proteins  Fatty cuts of meat. Ribs. Fried meat. John. Bologna, salami, and other precooked or cured meats, such as  sausages or meat loaves. Fat from the back of a pig (fatback). Bratwurst. Salted nuts and seeds. Canned beans with added salt. Canned or smoked fish. Whole eggs or egg yolks. Chicken or turkey with skin.  Dairy  Whole or 2% milk, cream, and half-and-half. Whole or full-fat cream cheese. Whole-fat or sweetened yogurt. Full-fat cheese. Nondairy creamers. Whipped toppings. Processed cheese and cheese spreads.  Fats and oils  Butter. Stick margarine. Lard. Shortening. Ghee. John fat. Tropical oils, such as coconut, palm kernel, or palm oil.  Seasonings and condiments  Onion salt, garlic salt, seasoned salt, table salt, and sea salt. Worcestershire sauce. Tartar sauce. Barbecue sauce. Teriyaki sauce. Soy sauce, including reduced-sodium. Steak sauce. Canned and packaged gravies. Fish sauce. Oyster sauce. Cocktail sauce. Store-bought horseradish. Ketchup. Mustard. Meat flavorings and tenderizers. Bouillon cubes. Hot sauces. Pre-made or packaged marinades. Pre-made or packaged taco seasonings. Relishes. Regular salad dressings.  Other foods  Salted popcorn and pretzels.  The items listed above may not be a complete list of foods and beverages you should avoid. Contact a dietitian for more information.  Where to find more information  · National Heart, Lung, and Blood [place]: www.nhlbi.nih.gov  · American Heart Association: www.heart.org  · Academy of Nutrition and Dietetics: www.eatright.org  · National Kidney Foundation: www.kidney.org  Summary  · The DASH eating plan is a healthy eating plan that has been shown to reduce high blood pressure (hypertension). It may also reduce your risk for type 2 diabetes, heart disease, and stroke.  · When on the DASH eating plan, aim to eat more fresh fruits and vegetables, whole grains, lean proteins, low-fat dairy, and heart-healthy fats.  · With the DASH eating plan, you should limit salt (sodium) intake to 2,300 mg a day. If you have hypertension, you may need to reduce your  sodium intake to 1,500 mg a day.  · Work with your health care provider or dietitian to adjust your eating plan to your individual calorie needs.  This information is not intended to replace advice given to you by your health care provider. Make sure you discuss any questions you have with your health care provider.  Document Revised: 11/20/2020 Document Reviewed: 11/20/2020  [employer] Patient Education © 2021 [employer]

## 2021-12-22 NOTE — PROGRESS NOTES
Chief Complaint  Annual Exam, Hypertension, Heartburn, and Nail Problem (left great toenail)    Subjective          History of Present Illness  Patient presents for her annual wellness/preventive visit. She is also following up on multiple chronic medication conditions, including thyroid nodules, asthma, hiatal hernia. She feels well today. She does have discoloration of the left great toenail and thickening of the nail. She has not tried any topical treatments on the nail.         Current Outpatient Medications:   •  albuterol sulfate  (90 Base) MCG/ACT inhaler, Inhale 2 puffs., Disp: , Rfl:   •  anastrozole (ARIMIDEX) 1 MG tablet, TAKE 1 TABLET DAILY, Disp: 90 tablet, Rfl: 3  •  Calcium Carbonate-Vitamin D (CALCIUM 600+D) 600-200 MG-UNIT tablet, 2 (Two) Times a Day., Disp: , Rfl:   •  ferrous sulfate 325 (65 FE) MG tablet, Take 325 mg by mouth Daily With Breakfast., Disp: , Rfl:   •  loratadine (CLARITIN) 10 MG tablet, CLARITIN 10 MG TABS, Disp: , Rfl:   •  Multiple Vitamin (MULTI-VITAMIN) tablet, MULTI-VITAMIN TABS, Disp: , Rfl:   •  pantoprazole (PROTONIX) 40 MG EC tablet, TAKE 1 TABLET DAILY, Disp: 90 tablet, Rfl: 3  •  losartan (Cozaar) 25 MG tablet, Take 1 tablet by mouth Daily., Disp: 30 tablet, Rfl: 1  •  terbinafine (lamiSIL) 250 MG tablet, Take 1 tablet by mouth Daily., Disp: 30 tablet, Rfl: 0     Review of Systems   Constitutional: Negative for appetite change, chills, diaphoresis, fatigue, fever, unexpected weight gain and unexpected weight loss.   HENT: Negative for congestion, ear discharge, ear pain, hearing loss, mouth sores, nosebleeds, postnasal drip, rhinorrhea, sinus pressure, sneezing, sore throat, swollen glands and trouble swallowing.    Eyes: Negative for blurred vision, double vision, pain, discharge, redness and itching.   Respiratory: Negative for apnea, cough, choking, shortness of breath, wheezing and stridor.    Cardiovascular: Negative for chest pain, palpitations and leg  "swelling.   Gastrointestinal: Negative for abdominal distention, abdominal pain, anal bleeding, blood in stool, constipation, diarrhea, nausea, rectal pain, vomiting, GERD and indigestion.   Endocrine: Negative for cold intolerance, heat intolerance, polydipsia, polyphagia and polyuria.   Genitourinary: Negative for breast discharge, breast lump, breast pain, difficulty urinating, dysuria, flank pain, frequency, genital sores, hematuria, pelvic pain, urgency, urinary incontinence, vaginal bleeding, vaginal discharge and vaginal pain.   Musculoskeletal: Negative for arthralgias, back pain, gait problem, joint swelling, myalgias, neck pain and neck stiffness.   Skin: Negative for color change, rash and skin lesions.        Left great toenail discoloration   Neurological: Negative for dizziness, tremors, seizures, syncope, speech difficulty, weakness, light-headedness, numbness, headache, memory problem and confusion.   Hematological: Negative for adenopathy. Does not bruise/bleed easily.   Psychiatric/Behavioral: Negative for self-injury, sleep disturbance, suicidal ideas, depressed mood and stress. The patient is not nervous/anxious.         Objective   Vital Signs:   Vitals:    12/22/21 1054 12/22/21 1136   BP: 152/91 148/92   BP Location: Left arm    Patient Position: Sitting    Cuff Size: Large Adult    Pulse: 90    Resp: 18    Temp: 97.4 °F (36.3 °C)    TempSrc: Infrared    SpO2: 98%    Weight: 81.2 kg (179 lb)    Height: 165.1 cm (65\")      Body mass index is 29.79 kg/m².      Physical Exam  Vitals reviewed.   Constitutional:       General: She is not in acute distress.     Appearance: She is well-developed.   HENT:      Head: Normocephalic and atraumatic.      Right Ear: Tympanic membrane, ear canal and external ear normal. Tympanic membrane is not injected, erythematous, retracted or bulging.      Left Ear: Tympanic membrane, ear canal and external ear normal. Tympanic membrane is not injected, erythematous, " retracted or bulging.      Nose: Nose normal. No mucosal edema or rhinorrhea.      Right Sinus: No maxillary sinus tenderness or frontal sinus tenderness.      Left Sinus: No maxillary sinus tenderness or frontal sinus tenderness.      Mouth/Throat:      Mouth: No oral lesions.      Pharynx: Uvula midline. No oropharyngeal exudate or posterior oropharyngeal erythema.   Eyes:      General: Lids are normal.         Right eye: No discharge.         Left eye: No discharge.      Conjunctiva/sclera: Conjunctivae normal.      Pupils: Pupils are equal, round, and reactive to light.   Neck:      Thyroid: No thyroid mass or thyromegaly.      Vascular: No carotid bruit or JVD.      Trachea: No tracheal deviation.   Cardiovascular:      Rate and Rhythm: Normal rate and regular rhythm.      Heart sounds: Normal heart sounds. No murmur heard.  No friction rub. No gallop.    Pulmonary:      Effort: Pulmonary effort is normal. No respiratory distress.      Breath sounds: Normal breath sounds. No wheezing or rales.   Chest:   Breasts: Breasts are symmetrical.      Right: No inverted nipple, mass, nipple discharge, skin change or tenderness.      Left: No inverted nipple, mass, nipple discharge, skin change or tenderness.       Abdominal:      General: Bowel sounds are normal. There is no distension.      Palpations: Abdomen is soft. There is no mass.      Tenderness: There is no abdominal tenderness.      Hernia: No hernia is present.   Genitourinary:     Exam position: Supine.      Labia:         Right: No rash, tenderness or lesion.         Left: No rash, tenderness or lesion.       Vagina: Normal. No vaginal discharge, erythema or tenderness.      Cervix: No cervical motion tenderness, discharge or friability.      Uterus: Not tender.       Adnexa:         Right: No mass, tenderness or fullness.          Left: No mass, tenderness or fullness.        Rectum: Normal.   Musculoskeletal:         General: No deformity. Normal range of  motion.      Cervical back: Normal range of motion and neck supple.   Feet:      Left foot:      Toenail Condition: Left toenails are abnormally thick. Fungal disease present.     Comments: Left great toenail only  Lymphadenopathy:      Cervical: No cervical adenopathy.      Lower Body: No right inguinal adenopathy. No left inguinal adenopathy.   Skin:     General: Skin is warm and dry.      Findings: No lesion or rash.   Neurological:      Mental Status: She is alert and oriented to person, place, and time.      Cranial Nerves: No cranial nerve deficit.      Sensory: No sensory deficit.      Gait: Gait normal.      Deep Tendon Reflexes: Reflexes are normal and symmetric.   Psychiatric:         Speech: Speech normal.         Behavior: Behavior normal.         Thought Content: Thought content normal.         Judgment: Judgment normal.          Result Review :   The following data was reviewed by: LELA Caldwell on 12/22/2021:  CMP    CMP 2/19/21 4/23/21 10/29/21   Glucose 93 80 92   BUN 12 17 15   Creatinine 0.71 0.70 0.61   eGFR Non  Am 96 86 101   eGFR African Am 110     Sodium 141 141 140   Potassium 4.4 4.7 4.1   Chloride 101 103 102   Calcium 10.3 (A) 10.3 10.0   Total Protein 7.3     Albumin 4.4 4.20 4.30   Globulin 2.9     Total Bilirubin 0.5 0.5 0.5   Alkaline Phosphatase 130 (A) 107 119 (A)   AST (SGOT) 16 16 15   ALT (SGPT) 15 10 13   (A) Abnormal value            CBC    CBC 4/23/21 10/29/21   WBC 8.07 9.33   RBC 4.50 4.40   Hemoglobin 13.5 13.1   Hematocrit 41.7 40.6   MCV 92.7 92.3   MCH 30.0 29.8   MCHC 32.4 32.3   RDW 13.1 12.6   Platelets 304 323           Lab Results   Component Value Date    TSH 0.420 (L) 10/27/2020    T7YAFSQ 105 10/27/2020       Office Visit on 12/22/2021   Component Date Value Ref Range Status   • Color 12/22/2021 Yellow  Yellow, Straw, Dark Yellow, Aixa Final   • Clarity, UA 12/22/2021 Clear  Clear Final   • Specific Gravity  12/22/2021 1.030  1.005 - 1.030 Final   •  pH, Urine 12/22/2021 6.5  5.0 - 8.0 Final   • Leukocytes 12/22/2021 Negative  Negative Final   • Nitrite, UA 12/22/2021 Negative  Negative Final   • Protein, POC 12/22/2021 Trace* Negative mg/dL Final   • Glucose, UA 12/22/2021 Negative  Negative, 1000 mg/dL (3+) mg/dL Final   • Ketones, UA 12/22/2021 Negative  Negative Final   • Urobilinogen, UA 12/22/2021 Normal  Normal Final   • Bilirubin 12/22/2021 Negative  Negative Final   • Blood, UA 12/22/2021 Trace* Negative Final   • Lot Number 12/22/2021 [id number]   Final   • Expiration Date 12/22/2021 [id number]   Final     Data reviewed: Consultant notes 10/29/21 Oncology Note            Assessment and Plan    Diagnoses and all orders for this visit:    1. Preventative health care (Primary)  Comments:  Discussed age appropriate health maintenance.   Orders:  -     POC Urinalysis Dipstick, Automated    2. Primary hypertension  Assessment & Plan:  She continues to have elevated blood pressure.   Will start ARB and she is going to start eating healthier and exercising.   Follow-up in 1 month, sooner for any problems.       3. Thyroid nodule  Assessment & Plan:  2020 ultrasound showed multiple thyroid nodules, largest nodules measuring 1 cm and were consistent with Ti-Rads category 4 nodules. A follow-up thyroid ultrasound in 12 months was recommended.  2018 ultrasound showed mild thyroid enlargement with multiple thyroid nodules, the largest was 1.3 x 1.1 cm in the right thyroid lobe.  Findings were consistent with multinodular goiter.  2019 nuclear medicine thyroid imaging with uptake showed normal uptake and no focal lesion.    Recommended repeating thyroid u/s - she is agreeable to this, but wants to wait until next summer to do it.     Orders:  -     US Thyroid; Future  -     TSH  -     T4, Free  -     T3    4. Hyperthyroidism  Assessment & Plan:  Followed by Dr. Vital in the past, but has not seen him for about 2 years.  We will recheck lab work and refer back to him if  abnormal.    Orders:  -     TSH  -     T4, Free  -     T3    5. Onychomycosis of left great toe  Assessment & Plan:  Discussed risks and benefits of oral versus topical antifungal treatments.   She would like to try oral antifungal.   Will need to check hepatic function at 4 and 8 weeks of therapy.       Orders:  -     terbinafine (lamiSIL) 250 MG tablet; Take 1 tablet by mouth Daily.  Dispense: 30 tablet; Refill: 0    6. Iron deficiency anemia due to chronic blood loss  Assessment & Plan:  Followed by Dr. Bautista and Dr. Daniels.    Orders:  -     CBC & Differential  -     Comprehensive Metabolic Panel    7. Seasonal allergic rhinitis due to pollen  Assessment & Plan:  Mild. Controlled with PRN use of oral antihistamine.       8. Calculus of gallbladder without cholecystitis without obstruction  Assessment & Plan:  Asymptomatic.      9. Hiatal hernia  Assessment & Plan:  Continue follow-up with GI.      10. Benign essential microscopic hematuria  Assessment & Plan:  Negative work-up by urology in the past, including CT of the abdomen and pelvis.  No worsening.  We will continue to monitor.      11. History of breast cancer  Assessment & Plan:  Negative mammogram in June 2021.  Continue follow-up with Dr. Bautista.      12. Mild intermittent asthma without complication  Assessment & Plan:  Mild.  Has not needed rescue inhaler in years.  Report any worsening.          13. Anxiety  Assessment & Plan:  No recent problems.      14. Need for hepatitis C screening test  -     Hepatitis C Antibody    15. Screening for HIV (human immunodeficiency virus)  -     HIV-1 / O / 2 Ag / Antibody 4th Generation    16. Screening for cervical cancer  Comments:  Negative co-testing in 2019. She wants to do Pap alone annually and HPV testing every 5 years.   Orders:  -     PAP, Liquid Based ([facility] Only)    Other orders  -     losartan (Cozaar) 25 MG tablet; Take 1 tablet by mouth Daily.  Dispense: 30 tablet; Refill: 1          Follow Up    Return in about 1 month (around 1/22/2022) for Follow-Up blood pressure.    Patient was given instructions and counseling regarding her condition and health maintenance advice. Please see specific information in the After Visit Summary.     Kari Lou, LELA 12/22/2021 13:24 EST  This note was electronically signed.

## 2021-12-22 NOTE — ASSESSMENT & PLAN NOTE
She continues to have elevated blood pressure.   Will start ARB and she is going to start eating healthier and exercising.   Follow-up in 1 month, sooner for any problems.

## 2021-12-22 NOTE — ASSESSMENT & PLAN NOTE
Discussed risks and benefits of oral versus topical antifungal treatments.   She would like to try oral antifungal.   Will need to check hepatic function at 4 and 8 weeks of therapy.

## 2021-12-23 PROBLEM — R74.8 ELEVATED ALKALINE PHOSPHATASE LEVEL: Status: RESOLVED | Noted: 2020-10-28 | Resolved: 2021-12-23

## 2021-12-23 LAB
ALBUMIN SERPL-MCNC: 4.5 G/DL (ref 3.8–4.9)
ALBUMIN/GLOB SERPL: 1.4 {RATIO} (ref 1.2–2.2)
ALP SERPL-CCNC: 110 IU/L (ref 44–121)
ALT SERPL-CCNC: 17 IU/L (ref 0–32)
AST SERPL-CCNC: 20 IU/L (ref 0–40)
BASOPHILS # BLD AUTO: 0 X10E3/UL (ref 0–0.2)
BASOPHILS NFR BLD AUTO: 1 %
BILIRUB SERPL-MCNC: 0.5 MG/DL (ref 0–1.2)
BUN SERPL-MCNC: 16 MG/DL (ref 6–24)
BUN/CREAT SERPL: 21 (ref 9–23)
CALCIUM SERPL-MCNC: 10.6 MG/DL (ref 8.7–10.2)
CHLORIDE SERPL-SCNC: 102 MMOL/L (ref 96–106)
CO2 SERPL-SCNC: 26 MMOL/L (ref 20–29)
CREAT SERPL-MCNC: 0.76 MG/DL (ref 0.57–1)
EOSINOPHIL # BLD AUTO: 0.2 X10E3/UL (ref 0–0.4)
EOSINOPHIL NFR BLD AUTO: 2 %
ERYTHROCYTE [DISTWIDTH] IN BLOOD BY AUTOMATED COUNT: 12.2 % (ref 11.7–15.4)
GLOBULIN SER CALC-MCNC: 3.2 G/DL (ref 1.5–4.5)
GLUCOSE SERPL-MCNC: 88 MG/DL (ref 65–99)
HCT VFR BLD AUTO: 40.1 % (ref 34–46.6)
HCV AB S/CO SERPL IA: <0.1 S/CO RATIO (ref 0–0.9)
HGB BLD-MCNC: 14 G/DL (ref 11.1–15.9)
HIV 1+2 AB+HIV1 P24 AG SERPL QL IA: NON REACTIVE
IMM GRANULOCYTES # BLD AUTO: 0 X10E3/UL (ref 0–0.1)
IMM GRANULOCYTES NFR BLD AUTO: 0 %
LYMPHOCYTES # BLD AUTO: 2.3 X10E3/UL (ref 0.7–3.1)
LYMPHOCYTES NFR BLD AUTO: 29 %
MCH RBC QN AUTO: 30.6 PG (ref 26.6–33)
MCHC RBC AUTO-ENTMCNC: 34.9 G/DL (ref 31.5–35.7)
MCV RBC AUTO: 88 FL (ref 79–97)
MONOCYTES # BLD AUTO: 0.6 X10E3/UL (ref 0.1–0.9)
MONOCYTES NFR BLD AUTO: 8 %
NEUTROPHILS # BLD AUTO: 4.7 X10E3/UL (ref 1.4–7)
NEUTROPHILS NFR BLD AUTO: 60 %
PLATELET # BLD AUTO: 342 X10E3/UL (ref 150–450)
POTASSIUM SERPL-SCNC: 4.9 MMOL/L (ref 3.5–5.2)
PROT SERPL-MCNC: 7.7 G/DL (ref 6–8.5)
RBC # BLD AUTO: 4.57 X10E6/UL (ref 3.77–5.28)
SODIUM SERPL-SCNC: 140 MMOL/L (ref 134–144)
T3 SERPL-MCNC: 113 NG/DL (ref 71–180)
T4 FREE SERPL-MCNC: 1.53 NG/DL (ref 0.82–1.77)
TSH SERPL DL<=0.005 MIU/L-ACNC: 0.26 UIU/ML (ref 0.45–4.5)
WBC # BLD AUTO: 7.9 X10E3/UL (ref 3.4–10.8)

## 2022-01-19 ENCOUNTER — TELEPHONE (OUTPATIENT)
Dept: FAMILY MEDICINE CLINIC | Facility: CLINIC | Age: 57
End: 2022-01-19

## 2022-01-19 NOTE — TELEPHONE ENCOUNTER
Caller: Lisseth Linder    Relationship: Self    Best call back number: [phone]     What is the best time to reach you: ANYTIME    Who are you requesting to speak with (clinical staff, provider,  specific staff member): CLINICAL    Do you know the name of the person who called:     What was the call regarding: PATIENT CALLING WANTING TO KNOW IF SHE NEED TO HAVE LABS PRIOR TO HER APPOINTMENT OR AFTER HER APPOINTMENT SHE IS UNSURE HOW ANGY WANTED HER TO DO IT     Do you require a callback: YES        .”

## 2022-01-19 NOTE — TELEPHONE ENCOUNTER
We are just going to check the liver tests for her to continue taking the terbinafine. We can just do it that day when she has her appointment.

## 2022-01-27 ENCOUNTER — OFFICE VISIT (OUTPATIENT)
Dept: FAMILY MEDICINE CLINIC | Facility: CLINIC | Age: 57
End: 2022-01-27

## 2022-01-27 VITALS
WEIGHT: 180 LBS | SYSTOLIC BLOOD PRESSURE: 123 MMHG | TEMPERATURE: 97.8 F | OXYGEN SATURATION: 96 % | HEIGHT: 65 IN | HEART RATE: 95 BPM | BODY MASS INDEX: 29.99 KG/M2 | RESPIRATION RATE: 18 BRPM | DIASTOLIC BLOOD PRESSURE: 76 MMHG

## 2022-01-27 DIAGNOSIS — Z51.81 MEDICATION MONITORING ENCOUNTER: ICD-10-CM

## 2022-01-27 DIAGNOSIS — B35.1 ONYCHOMYCOSIS OF LEFT GREAT TOE: ICD-10-CM

## 2022-01-27 DIAGNOSIS — I10 PRIMARY HYPERTENSION: Primary | ICD-10-CM

## 2022-01-27 PROCEDURE — 99214 OFFICE O/P EST MOD 30 MIN: CPT | Performed by: NURSE PRACTITIONER

## 2022-01-27 RX ORDER — LOSARTAN POTASSIUM 25 MG/1
25 TABLET ORAL DAILY
Qty: 90 TABLET | Refills: 3 | Status: SHIPPED | OUTPATIENT
Start: 2022-01-27 | End: 2022-01-28 | Stop reason: SDUPTHER

## 2022-01-27 NOTE — PROGRESS NOTES
Chief Complaint  Hypertension    Subjective          History of Present Illness  Hypertension  Her blood pressure was elevated at her last visit and Losartan was started. She feels well and has had no problems with the medication.     Onychomycosis  Last month she complained of discoloration of the left great toenail and thickening of the nail. She had not tried any topical treatments on the nail. She was diagnosed with onychomycosis and was started on oral terbinafine. She is not having any problems with the medication.         Current Outpatient Medications:   •  albuterol sulfate  (90 Base) MCG/ACT inhaler, Inhale 2 puffs., Disp: , Rfl:   •  anastrozole (ARIMIDEX) 1 MG tablet, TAKE 1 TABLET DAILY, Disp: 90 tablet, Rfl: 3  •  Calcium Carbonate-Vitamin D (CALCIUM 600+D) 600-200 MG-UNIT tablet, 2 (Two) Times a Day., Disp: , Rfl:   •  ferrous sulfate 325 (65 FE) MG tablet, Take 325 mg by mouth Daily With Breakfast., Disp: , Rfl:   •  loratadine (CLARITIN) 10 MG tablet, CLARITIN 10 MG TABS, Disp: , Rfl:   •  losartan (Cozaar) 25 MG tablet, Take 1 tablet by mouth Daily., Disp: 90 tablet, Rfl: 3  •  Multiple Vitamin (MULTI-VITAMIN) tablet, MULTI-VITAMIN TABS, Disp: , Rfl:   •  pantoprazole (PROTONIX) 40 MG EC tablet, TAKE 1 TABLET DAILY, Disp: 90 tablet, Rfl: 3  •  terbinafine (lamiSIL) 250 MG tablet, Take 1 tablet by mouth Daily., Disp: 30 tablet, Rfl: 0     Review of Systems   Constitutional: Negative for activity change, appetite change, diaphoresis, fatigue, unexpected weight gain and unexpected weight loss.   Respiratory: Negative for cough and shortness of breath.    Cardiovascular: Negative for chest pain, palpitations and leg swelling.   Genitourinary: Negative for frequency.   Neurological: Negative for dizziness, syncope and headache.        Objective   Vital Signs:   Vitals:    01/27/22 1515   BP: 123/76   BP Location: Left arm   Patient Position: Sitting   Cuff Size: Adult   Pulse: 95   Resp: 18  "  Temp: 97.8 °F (36.6 °C)   TempSrc: Infrared   SpO2: 96%   Weight: 81.6 kg (180 lb)   Height: 165.1 cm (65\")     Body mass index is 29.95 kg/m².  Patient's last menstrual period was 02/12/2015.     Physical Exam  Constitutional:       Appearance: She is well-developed.   Neck:      Thyroid: No thyromegaly.      Vascular: No carotid bruit.      Trachea: Trachea normal.   Cardiovascular:      Rate and Rhythm: Normal rate and regular rhythm.      Heart sounds: Normal heart sounds. No murmur heard.  No friction rub. No gallop.    Pulmonary:      Effort: Pulmonary effort is normal.      Breath sounds: Normal breath sounds. No wheezing or rales.   Musculoskeletal:         General: Normal range of motion.      Cervical back: Normal range of motion and neck supple.   Lymphadenopathy:      Cervical: No cervical adenopathy.   Skin:     General: Skin is warm and dry.   Neurological:      Mental Status: She is alert and oriented to person, place, and time.   Psychiatric:         Behavior: Behavior normal.         Thought Content: Thought content normal.         Judgment: Judgment normal.          Result Review :   The following data was reviewed by: LELA Caldwell on 01/27/2022:    No visits with results within 1 Day(s) from this visit.   Latest known visit with results is:   Office Visit on 12/22/2021   Component Date Value Ref Range Status   • WBC 12/22/2021 7.9  3.4 - 10.8 x10E3/uL Final   • RBC 12/22/2021 4.57  3.77 - 5.28 x10E6/uL Final   • Hemoglobin 12/22/2021 14.0  11.1 - 15.9 g/dL Final   • Hematocrit 12/22/2021 40.1  34.0 - 46.6 % Final   • MCV 12/22/2021 88  79 - 97 fL Final   • MCH 12/22/2021 30.6  26.6 - 33.0 pg Final   • MCHC 12/22/2021 34.9  31.5 - 35.7 g/dL Final   • RDW 12/22/2021 12.2  11.7 - 15.4 % Final   • Platelets 12/22/2021 342  150 - 450 x10E3/uL Final   • Neutrophil Rel % 12/22/2021 60  Not Estab. % Final   • Lymphocyte Rel % 12/22/2021 29  Not Estab. % Final   • Monocyte Rel % 12/22/2021 8  Not " Estab. % Final   • Eosinophil Rel % 12/22/2021 2  Not Estab. % Final   • Basophil Rel % 12/22/2021 1  Not Estab. % Final   • Neutrophils Absolute 12/22/2021 4.7  1.4 - 7.0 x10E3/uL Final   • Lymphocytes Absolute 12/22/2021 2.3  0.7 - 3.1 x10E3/uL Final   • Monocytes Absolute 12/22/2021 0.6  0.1 - 0.9 x10E3/uL Final   • Eosinophils Absolute 12/22/2021 0.2  0.0 - 0.4 x10E3/uL Final   • Basophils Absolute 12/22/2021 0.0  0.0 - 0.2 x10E3/uL Final   • Immature Granulocyte Rel % 12/22/2021 0  Not Estab. % Final   • Immature Grans Absolute 12/22/2021 0.0  0.0 - 0.1 x10E3/uL Final   • TSH 12/22/2021 0.260* 0.450 - 4.500 uIU/mL Final   • Free T4 12/22/2021 1.53  0.82 - 1.77 ng/dL Final   • T3, Total 12/22/2021 113  71 - 180 ng/dL Final   • Glucose 12/22/2021 88  65 - 99 mg/dL Final   • BUN 12/22/2021 16  6 - 24 mg/dL Final   • Creatinine 12/22/2021 0.76  0.57 - 1.00 mg/dL Final   • eGFR Non  Am 12/22/2021 87  >59 mL/min/1.73 Final   • eGFR African Am 12/22/2021 101  >59 mL/min/1.73 Final    Comment: **In accordance with recommendations from the NKF-ASN Task force,**    [facility] is in the process of updating its eGFR calculation to the    2021 CKD-EPI creatinine equation that estimates kidney function    without a race variable.     • BUN/Creatinine Ratio 12/22/2021 21  9 - 23 Final   • Sodium 12/22/2021 140  134 - 144 mmol/L Final   • Potassium 12/22/2021 4.9  3.5 - 5.2 mmol/L Final   • Chloride 12/22/2021 102  96 - 106 mmol/L Final   • Total CO2 12/22/2021 26  20 - 29 mmol/L Final   • Calcium 12/22/2021 10.6* 8.7 - 10.2 mg/dL Final   • Total Protein 12/22/2021 7.7  6.0 - 8.5 g/dL Final   • Albumin 12/22/2021 4.5  3.8 - 4.9 g/dL Final   • Globulin 12/22/2021 3.2  1.5 - 4.5 g/dL Final   • A/G Ratio 12/22/2021 1.4  1.2 - 2.2 Final   • Total Bilirubin 12/22/2021 0.5  0.0 - 1.2 mg/dL Final   • Alkaline Phosphatase 12/22/2021 110  44 - 121 IU/L Final                  **Please note reference interval change**   • AST (SGOT)  12/22/2021 20  0 - 40 IU/L Final   • ALT (SGPT) 12/22/2021 17  0 - 32 IU/L Final   • Diagnosis 12/22/2021 Comment   Final    Comment: NEGATIVE FOR INTRAEPITHELIAL LESION OR MALIGNANCY.  CELLULAR CHANGES ASSOCIATED WITH ATROPHY ARE PRESENT.     • Specimen adequacy: 12/22/2021 Comment   Final    Comment: Satisfactory for evaluation.  Endocervical and/or squamous metaplastic  cells (endocervical component) are present.     • Clinician Provided ICD-10: 12/22/2021 Comment   Final    Z12.4   • Performed by: 12/22/2021 Comment   Final    Edu Tam, Cytotechnologist ([facility])   • . 12/22/2021 .   Final   • Note: 12/22/2021 Comment   Final    Comment: The Pap smear is a screening test designed to aid in the detection of  premalignant and malignant conditions of the uterine cervix.  It is not a  diagnostic procedure and should not be used as the sole means of detecting  cervical cancer.  Both false-positive and false-negative reports do occur.     • HIV Screen 4th Gen w/RFX (Referenc* 12/22/2021 Non Reactive  Non Reactive Final   • Hep C Virus Ab 12/22/2021 <0.1  0.0 - 0.9 s/co ratio Final    Comment:                                   Negative:     < 0.8                               Indeterminate: 0.8 - 0.9                                    Positive:     > 0.9   The CDC recommends that a positive HCV antibody result   be followed up with a HCV Nucleic Acid Amplification   test ([id number]).     • Color 12/22/2021 Yellow  Yellow, Straw, Dark Yellow, Aixa Final   • Clarity, UA 12/22/2021 Clear  Clear Final   • Specific Gravity  12/22/2021 1.030  1.005 - 1.030 Final   • pH, Urine 12/22/2021 6.5  5.0 - 8.0 Final   • Leukocytes 12/22/2021 Negative  Negative Final   • Nitrite, UA 12/22/2021 Negative  Negative Final   • Protein, POC 12/22/2021 Trace* Negative mg/dL Final   • Glucose, UA 12/22/2021 Negative  Negative, 1000 mg/dL (3+) mg/dL Final   • Ketones, UA 12/22/2021 Negative  Negative Final   • Urobilinogen, UA  12/22/2021 Normal  Normal Final   • Bilirubin 12/22/2021 Negative  Negative Final   • Blood, UA 12/22/2021 Trace* Negative Final   • Lot Number 12/22/2021 [id number]   Final   • Expiration Date 12/22/2021 [id number]   Final                 Assessment and Plan    Diagnoses and all orders for this visit:    1. Primary hypertension (Primary)  Assessment & Plan:  Much better. Continue current dose of Losartan.     Orders:  -     Comprehensive Metabolic Panel    2. Onychomycosis of left great toe  Assessment & Plan:  Continue terbinafine for a 12 week period.   Recheck LFTs today and again in 1 month.       3. Medication monitoring encounter  -     Cancel: Hepatic Function Panel  -     Comprehensive Metabolic Panel    Other orders  -     losartan (Cozaar) 25 MG tablet; Take 1 tablet by mouth Daily.  Dispense: 90 tablet; Refill: 3          Follow Up   No follow-ups on file.    Patient was given instructions and counseling regarding her condition and health maintenance advice. Please see specific information in the After Visit Summary.     Kari Lou, APRN 1/27/2022 16:18 EST  This note was electronically signed.

## 2022-01-28 DIAGNOSIS — Z51.81 MEDICATION MONITORING ENCOUNTER: Primary | ICD-10-CM

## 2022-01-28 DIAGNOSIS — B35.1 ONYCHOMYCOSIS OF LEFT GREAT TOE: ICD-10-CM

## 2022-01-28 LAB
ALBUMIN SERPL-MCNC: 4.6 G/DL (ref 3.8–4.9)
ALBUMIN/GLOB SERPL: 1.8 {RATIO} (ref 1.2–2.2)
ALP SERPL-CCNC: 111 IU/L (ref 44–121)
ALT SERPL-CCNC: 17 IU/L (ref 0–32)
AST SERPL-CCNC: 17 IU/L (ref 0–40)
BILIRUB SERPL-MCNC: 0.4 MG/DL (ref 0–1.2)
BUN SERPL-MCNC: 20 MG/DL (ref 6–24)
BUN/CREAT SERPL: 25 (ref 9–23)
CALCIUM SERPL-MCNC: 9.6 MG/DL (ref 8.7–10.2)
CHLORIDE SERPL-SCNC: 100 MMOL/L (ref 96–106)
CO2 SERPL-SCNC: 24 MMOL/L (ref 20–29)
CREAT SERPL-MCNC: 0.8 MG/DL (ref 0.57–1)
GLOBULIN SER CALC-MCNC: 2.6 G/DL (ref 1.5–4.5)
GLUCOSE SERPL-MCNC: 96 MG/DL (ref 65–99)
POTASSIUM SERPL-SCNC: 4.2 MMOL/L (ref 3.5–5.2)
PROT SERPL-MCNC: 7.2 G/DL (ref 6–8.5)
SODIUM SERPL-SCNC: 139 MMOL/L (ref 134–144)

## 2022-01-28 RX ORDER — TERBINAFINE HYDROCHLORIDE 250 MG/1
250 TABLET ORAL DAILY
Qty: 30 TABLET | Refills: 0 | Status: SHIPPED | OUTPATIENT
Start: 2022-01-28 | End: 2022-03-01 | Stop reason: SDUPTHER

## 2022-01-28 RX ORDER — LOSARTAN POTASSIUM 25 MG/1
25 TABLET ORAL DAILY
Qty: 90 TABLET | Refills: 3 | Status: SHIPPED | OUTPATIENT
Start: 2022-01-28 | End: 2023-01-09 | Stop reason: SDUPTHER

## 2022-01-28 NOTE — TELEPHONE ENCOUNTER
Caller: Lisseth Linder    Relationship: Self    Best call back number: [phone]    Requested Prescriptions:   Requested Prescriptions     Pending Prescriptions Disp Refills   • losartan (Cozaar) 25 MG tablet 90 tablet 3     Sig: Take 1 tablet by mouth Daily.        Pharmacy where request should be sent: [facility] - [address] [phone] [facility] [phone]      Additional details provided by patient: PATIENT HAS 3 DAYS OF MEDICATION REMAINING.    Does the patient have less than a 3 day supply:  [] Yes  [x] No    Bharathi Whittington   01/28/22 08:18 EST

## 2022-02-07 ENCOUNTER — HOSPITAL ENCOUNTER (OUTPATIENT)
Dept: BONE DENSITY | Facility: HOSPITAL | Age: 57
Discharge: HOME | End: 2022-02-07
Admitting: INTERNAL MEDICINE

## 2022-02-07 DIAGNOSIS — Z78.0 POST-MENOPAUSAL: ICD-10-CM

## 2022-02-07 DIAGNOSIS — Z17.0 MALIGNANT NEOPLASM OF BREAST IN FEMALE, ESTROGEN RECEPTOR POSITIVE, UNSPECIFIED LATERALITY, UNSPECIFIED SITE OF BREAST: ICD-10-CM

## 2022-02-07 DIAGNOSIS — C50.919 MALIGNANT NEOPLASM OF BREAST IN FEMALE, ESTROGEN RECEPTOR POSITIVE, UNSPECIFIED LATERALITY, UNSPECIFIED SITE OF BREAST: ICD-10-CM

## 2022-02-07 PROCEDURE — 77080 DXA BONE DENSITY AXIAL: CPT

## 2022-03-01 DIAGNOSIS — B35.1 ONYCHOMYCOSIS OF LEFT GREAT TOE: ICD-10-CM

## 2022-03-01 RX ORDER — TERBINAFINE HYDROCHLORIDE 250 MG/1
250 TABLET ORAL DAILY
Qty: 30 TABLET | Refills: 0 | Status: SHIPPED | OUTPATIENT
Start: 2022-03-01 | End: 2022-09-13

## 2022-03-21 DIAGNOSIS — C50.919 MALIGNANT NEOPLASM OF BREAST IN FEMALE, ESTROGEN RECEPTOR POSITIVE, UNSPECIFIED LATERALITY, UNSPECIFIED SITE OF BREAST: ICD-10-CM

## 2022-03-21 DIAGNOSIS — Z17.0 MALIGNANT NEOPLASM OF BREAST IN FEMALE, ESTROGEN RECEPTOR POSITIVE, UNSPECIFIED LATERALITY, UNSPECIFIED SITE OF BREAST: ICD-10-CM

## 2022-03-21 RX ORDER — ANASTROZOLE 1 MG/1
TABLET ORAL
Qty: 90 TABLET | Refills: 3 | Status: SHIPPED | OUTPATIENT
Start: 2022-03-21 | End: 2023-03-15

## 2022-04-28 ENCOUNTER — OFFICE VISIT (OUTPATIENT)
Dept: ONCOLOGY | Facility: CLINIC | Age: 57
End: 2022-04-28

## 2022-04-28 ENCOUNTER — LAB (OUTPATIENT)
Dept: LAB | Facility: HOSPITAL | Age: 57
End: 2022-04-28

## 2022-04-28 VITALS
TEMPERATURE: 97.3 F | DIASTOLIC BLOOD PRESSURE: 79 MMHG | BODY MASS INDEX: 29.95 KG/M2 | HEART RATE: 81 BPM | WEIGHT: 180 LBS | RESPIRATION RATE: 18 BRPM | SYSTOLIC BLOOD PRESSURE: 129 MMHG

## 2022-04-28 DIAGNOSIS — D50.0 IRON DEFICIENCY ANEMIA DUE TO CHRONIC BLOOD LOSS: ICD-10-CM

## 2022-04-28 DIAGNOSIS — Z17.0 MALIGNANT NEOPLASM OF BREAST IN FEMALE, ESTROGEN RECEPTOR POSITIVE, UNSPECIFIED LATERALITY, UNSPECIFIED SITE OF BREAST: Primary | ICD-10-CM

## 2022-04-28 DIAGNOSIS — C50.919 MALIGNANT NEOPLASM OF BREAST IN FEMALE, ESTROGEN RECEPTOR POSITIVE, UNSPECIFIED LATERALITY, UNSPECIFIED SITE OF BREAST: Primary | ICD-10-CM

## 2022-04-28 LAB
ALBUMIN SERPL-MCNC: 4.4 G/DL (ref 3.5–5.2)
ALBUMIN/GLOB SERPL: 1.4 G/DL
ALP SERPL-CCNC: 111 U/L (ref 39–117)
ALT SERPL W P-5'-P-CCNC: 14 U/L (ref 1–33)
ANION GAP SERPL CALCULATED.3IONS-SCNC: 12 MMOL/L (ref 5–15)
AST SERPL-CCNC: 16 U/L (ref 1–32)
BASOPHILS # BLD AUTO: 0.04 10*3/MM3 (ref 0–0.2)
BASOPHILS NFR BLD AUTO: 0.5 % (ref 0–1.5)
BILIRUB SERPL-MCNC: 0.3 MG/DL (ref 0–1.2)
BUN SERPL-MCNC: 17 MG/DL (ref 6–20)
BUN/CREAT SERPL: 24.6 (ref 7–25)
CALCIUM SPEC-SCNC: 9.7 MG/DL (ref 8.6–10.5)
CHLORIDE SERPL-SCNC: 102 MMOL/L (ref 98–107)
CO2 SERPL-SCNC: 25 MMOL/L (ref 22–29)
CREAT SERPL-MCNC: 0.69 MG/DL (ref 0.57–1)
DEPRECATED RDW RBC AUTO: 42.6 FL (ref 37–54)
EGFRCR SERPLBLD CKD-EPI 2021: 100.7 ML/MIN/1.73
EOSINOPHIL # BLD AUTO: 0.19 10*3/MM3 (ref 0–0.4)
EOSINOPHIL NFR BLD AUTO: 2.3 % (ref 0.3–6.2)
ERYTHROCYTE [DISTWIDTH] IN BLOOD BY AUTOMATED COUNT: 13 % (ref 12.3–15.4)
GLOBULIN UR ELPH-MCNC: 3.2 GM/DL
GLUCOSE SERPL-MCNC: 85 MG/DL (ref 65–99)
HCT VFR BLD AUTO: 38.9 % (ref 34–46.6)
HGB BLD-MCNC: 12.9 G/DL (ref 12–15.9)
HOLD SPECIMEN: NORMAL
HOLD SPECIMEN: NORMAL
LYMPHOCYTES # BLD AUTO: 2.43 10*3/MM3 (ref 0.7–3.1)
LYMPHOCYTES NFR BLD AUTO: 29.3 % (ref 19.6–45.3)
MCH RBC QN AUTO: 30.3 PG (ref 26.6–33)
MCHC RBC AUTO-ENTMCNC: 33.2 G/DL (ref 31.5–35.7)
MCV RBC AUTO: 91.3 FL (ref 79–97)
MONOCYTES # BLD AUTO: 0.83 10*3/MM3 (ref 0.1–0.9)
MONOCYTES NFR BLD AUTO: 10 % (ref 5–12)
NEUTROPHILS NFR BLD AUTO: 4.8 10*3/MM3 (ref 1.7–7)
NEUTROPHILS NFR BLD AUTO: 57.9 % (ref 42.7–76)
PLATELET # BLD AUTO: 339 10*3/MM3 (ref 140–450)
PMV BLD AUTO: 9.4 FL (ref 6–12)
POTASSIUM SERPL-SCNC: 4.3 MMOL/L (ref 3.5–5.2)
PROT SERPL-MCNC: 7.6 G/DL (ref 6–8.5)
RBC # BLD AUTO: 4.26 10*6/MM3 (ref 3.77–5.28)
SODIUM SERPL-SCNC: 139 MMOL/L (ref 136–145)
WBC NRBC COR # BLD: 8.29 10*3/MM3 (ref 3.4–10.8)

## 2022-04-28 PROCEDURE — 36415 COLL VENOUS BLD VENIPUNCTURE: CPT | Performed by: INTERNAL MEDICINE

## 2022-04-28 PROCEDURE — 99214 OFFICE O/P EST MOD 30 MIN: CPT | Performed by: INTERNAL MEDICINE

## 2022-04-28 PROCEDURE — 80053 COMPREHEN METABOLIC PANEL: CPT

## 2022-04-28 PROCEDURE — 85025 COMPLETE CBC W/AUTO DIFF WBC: CPT | Performed by: INTERNAL MEDICINE

## 2022-04-28 RX ORDER — LOSARTAN POTASSIUM 25 MG/1
1 TABLET ORAL DAILY
COMMUNITY
Start: 2022-01-29 | End: 2022-09-13 | Stop reason: SDUPTHER

## 2022-04-28 RX ORDER — AZELASTINE 1 MG/ML
2 SPRAY, METERED NASAL 2 TIMES DAILY
COMMUNITY
Start: 2022-04-02

## 2022-04-28 RX ORDER — FEXOFENADINE HCL AND PSEUDOEPHEDRINE HCI 180; 240 MG/1; MG/1
1 TABLET, EXTENDED RELEASE ORAL DAILY
COMMUNITY
Start: 2022-04-02 | End: 2022-09-13 | Stop reason: ALTCHOICE

## 2022-04-28 NOTE — PROGRESS NOTES
Hematology/Oncology Outpatient Follow Up    PATIENT NAME:Lisseth Linder  :1965  MRN: [id number]  PRIMARY CARE PHYSICIAN: Kari Lou APRN  REFERRING PHYSICIAN: Kari Lou APRN    Chief Complaint   Patient presents with   • Follow-up     Malignant neoplasm of breast in female, estrogen receptor positive, unspecified laterality, unspecified site of breast (HCC)        HISTORY OF PRESENT ILLNESS:     This is a 54-year-old female who was originally seen on 10/6/14.  • Patient’s original screening mammogram, not available.   • 14, she underwent needle localized excisional biopsy of the left breast mass and sentinel lymph node biopsy.  Tumor measured 1.1 cm, negative surgical margins.  ER positive, AZ positive, HER-2/jamir negative.  • 14 - She had Oncotype DX assay performed on the tumor, which returned with a recurrent score of 10, which corresponds to 7% risk of distant recurrence following five years of Tamoxifen over a course of 10 years.  This places patient at low-risk disease.  ER was positive, AZ positive and HER-2/jamir was negative on the validation assay.    • 14 - Patient completed accelerated breast radiation under the care of Dr. Pickett.  • 14 - Patient had a comprehensive genetic analysis with [facility].  This returned with no significant mutation identified, but she was found to have variant of unknown significance in the APC and BARD-1 genes.      • 14 - Patient initiated on Tamoxifen 20 mg daily.   • 6/8/15 - Bilateral digital diagnostic mammogram which showed fatty breasts and postsurgical changes on the left breast, otherwise negative.  Follow up in one year was recommended.  She also had an ultrasound of the left breast which did not show any abnormalities.   • 2/10/16 - EGD and colonoscopy were done.  On the EGD she was found to have erosive gastritis, Manuel erosions and a duodenal ulcer.  Colonoscopy showed tubular adenoma.   • 16 - Follow up mammogram  was essentially normal with stable post-op changes in the left breast.  Follow up in one year was recommended.    • 12/28/16 - Anemia work up revealed reticulocyte count (N), ferritin (L) 4, folate > 24, haptoglobin (N) 145, , iron binding capacity (H) 555.  SPEP with immunofixation assay did not reveal any monoclonal protein.  B12 (N) 353.  Methylmalonic acid level (N) 244.    • 1/11/16 - Urinalysis done showed small blood.  Urine culture did not grow any significant organisms.     • 6/16/17 - Bilateral mammogram with tomosynthesis was a stable exam without any abnormalities detected.    • 8/23/17 - Chemistry panel:  BUN 13, creatinine 0.7, liver function tests (N).  Estradiol level < 20.  FHS 31.5, also in the postmenopausal range.    • December 2017 - Patient received Injectafer for iron deficiency anemia.  Received 750 mg Injectafer day 1 and day 8.  • December 2017 - Ferritin 10.    • 12/13/17 - Tamoxifen discontinued.  Arimidex 1 mg daily and Os-Brenden D were initiated.    • 1/24/18 - DEXA scan, normal.    • 6/19/18 - Patient had bilateral diagnostic mammogram with tomosynthesis.  No mammographic evidence of malignancy.  Follow up in one year was recommended.    • 8/8/18 - Ferritin level was 227.  BUN 13.  Creatinine 0.6.   • 6/14/2019 patient had bilateral diagnostic mammogram with tomosynthesis there is no mammographic evidence of malignancy follow-up in 1 year was recommended.  • 2/4/2020-patient had a DEXA scan which showed a normal bone density  • February 2020 patient had anemia work-up which showed persistent iron deficiency with a ferritin level of 4.  Patient was referred back to [facility] and is to receive  IV Injectafer but due to the pandemic she canceled her appointments  • 5/18/2020 patient had bilateral screening mammogram, there was no mammographic evidence of malignancy.  Follow-up in 1 year was recommended  • HPI has been reviewed      Past Medical History:   Diagnosis Date   • Adenomatous  colon polyp    • Allergic rhinitis    • Asthma    • Breast cancer (HCC)    • Cholelithiasis    • Constipation    • Duodenal ulcer    • Erosive gastritis    • Hiatal hernia    • History of breast cancer    • Hypothyroidism    • Iron deficiency anemia    • Microscopic hematuria    • Primary hypertension 12/22/2021   • SELMA (stress urinary incontinence, female)    • Thyroid nodule        Past Surgical History:   Procedure Laterality Date   • AUGMENTATION MAMMAPLASTY  1989   • BREAST LUMPECTOMY Left 2014   • BREAST RECONSTRUCTION Left 2014   • ENDOSCOPY     • SENTINEL LYMPH NODE BIOPSY           Current Outpatient Medications:   •  fexofenadine-pseudoephedrine (ALLEGRA-D 24) 180-240 MG per 24 hr tablet, Take 1 tablet by mouth Daily., Disp: , Rfl:   •  losartan (COZAAR) 25 MG tablet, Take 1 tablet by mouth Daily., Disp: , Rfl:   •  albuterol sulfate  (90 Base) MCG/ACT inhaler, Inhale 2 puffs., Disp: , Rfl:   •  anastrozole (ARIMIDEX) 1 MG tablet, TAKE 1 TABLET DAILY, Disp: 90 tablet, Rfl: 3  •  azelastine (ASTELIN) 0.1 % nasal spray, 2 sprays 2 (Two) Times a Day. as directed, Disp: , Rfl:   •  Calcium Carbonate-Vitamin D (CALCIUM 600+D) 600-200 MG-UNIT tablet, 2 (Two) Times a Day., Disp: , Rfl:   •  ferrous sulfate 325 (65 FE) MG tablet, Take 325 mg by mouth Daily With Breakfast., Disp: , Rfl:   •  loratadine (CLARITIN) 10 MG tablet, CLARITIN 10 MG TABS, Disp: , Rfl:   •  losartan (Cozaar) 25 MG tablet, Take 1 tablet by mouth Daily., Disp: 90 tablet, Rfl: 3  •  Multiple Vitamin (MULTI-VITAMIN) tablet, MULTI-VITAMIN TABS, Disp: , Rfl:   •  pantoprazole (PROTONIX) 40 MG EC tablet, TAKE 1 TABLET DAILY, Disp: 90 tablet, Rfl: 3  •  terbinafine (lamiSIL) 250 MG tablet, Take 1 tablet by mouth Daily., Disp: 30 tablet, Rfl: 0    No Known Allergies    Family History   Problem Relation Age of Onset   • Kidney cancer Father    • Prostate cancer Father    • Breast cancer Maternal Aunt    • Prostate cancer Paternal Aunt    •  Lung cancer Paternal Uncle    • Diabetes Mother    • Hypertension Mother    • Hyperlipidemia Mother    • Heart disease Other         Grandparents   • Breast cancer Cousin        Cancer-related family history includes Breast cancer in her cousin and maternal aunt; Kidney cancer in her father; Lung cancer in her paternal uncle; Prostate cancer in her father and paternal aunt.    Social History     Tobacco Use   • Smoking status: Never Smoker   • Smokeless tobacco: Never Used   Vaping Use   • Vaping Use: Never used   Substance Use Topics   • Alcohol use: No   • Drug use: No     I have reviewed and confirmed the accuracy of the patient's history: Chief complaint, HPI and ROS as entered by the MA/LPN/RN. Courtney Bautista MD 04/28/22       SUBJECTIVE:    Patient does not have any specific complaints today.  She performs breast exams and does not report any changes.        REVIEW OF SYSTEMS:    Review of Systems   Constitutional: Negative for chills and fever.   HENT: Negative for ear pain, mouth sores, nosebleeds and sore throat.    Eyes: Negative for photophobia and visual disturbance.   Respiratory: Negative for wheezing and stridor.    Cardiovascular: Negative for chest pain and palpitations.   Gastrointestinal: Negative for abdominal pain, diarrhea, nausea and vomiting.   Endocrine: Negative for cold intolerance and heat intolerance.   Genitourinary: Negative for dysuria and hematuria.   Musculoskeletal: Negative for joint swelling and neck stiffness.   Skin: Negative for color change and rash.   Neurological: Negative for seizures and syncope.   Hematological: Negative for adenopathy.        No obvious bleeding   Psychiatric/Behavioral: Negative for agitation, confusion and hallucinations.     I have reviewed and confirmed the accuracy of the ROS as documented by the MA/LPN/RN Courtney Bautista MD    OBJECTIVE:    Vitals:    04/28/22 1307   BP: 129/79   Pulse: 81   Resp: 18   Temp: 97.3 °F (36.3 °C)    TempSrc: Infrared   Weight: 81.6 kg (180 lb)   PainSc: 0-No pain       ECOG    (0) Fully active, able to carry on all predisease performance without restriction    Physical Exam   Constitutional: She is oriented to person, place, and time. No distress.   HENT:   Head: Normocephalic and atraumatic.   Eyes: Conjunctivae are normal. Right eye exhibits no discharge. Left eye exhibits no discharge. No scleral icterus.   Neck: No thyromegaly present.   Cardiovascular: Normal rate, regular rhythm and normal heart sounds. Exam reveals no gallop and no friction rub.   Pulmonary/Chest: Effort normal. No stridor. No respiratory distress. She has no wheezes. Right breast exhibits tenderness. Right breast exhibits no inverted nipple, no mass, no nipple discharge and no skin change. Left breast exhibits no inverted nipple, no mass, no nipple discharge, no skin change and no tenderness. No breast bleeding. Breasts are symmetrical.   Bilateral breast exam was negative  Bilateral axillary exam was negative   Abdominal: Soft. Bowel sounds are normal. She exhibits no mass. There is no abdominal tenderness. There is no rebound and no guarding.   Musculoskeletal: Normal range of motion. No tenderness.   Lymphadenopathy:     She has no cervical adenopathy.   Neurological: She is alert and oriented to person, place, and time. She exhibits normal muscle tone.   Skin: Skin is warm. No rash noted. She is not diaphoretic. No erythema.   Psychiatric: Her behavior is normal.   Nursing note and vitals reviewed.    Physical exam as documented above  I have reexamined the patient and the results are consistent with the previously documented exam. Courtney Bautista MD     RECENT LABS    WBC   Date Value Ref Range Status   04/28/2022 8.29 3.40 - 10.80 10*3/mm3 Final   12/22/2021 7.9 3.4 - 10.8 x10E3/uL Final     RBC   Date Value Ref Range Status   04/28/2022 4.26 3.77 - 5.28 10*6/mm3 Final   12/22/2021 4.57 3.77 - 5.28 x10E6/uL Final      Hemoglobin   Date Value Ref Range Status   04/28/2022 12.9 12.0 - 15.9 g/dL Final     Hematocrit   Date Value Ref Range Status   04/28/2022 38.9 34.0 - 46.6 % Final     MCV   Date Value Ref Range Status   04/28/2022 91.3 79.0 - 97.0 fL Final     MCH   Date Value Ref Range Status   04/28/2022 30.3 26.6 - 33.0 pg Final     MCHC   Date Value Ref Range Status   04/28/2022 33.2 31.5 - 35.7 g/dL Final     RDW   Date Value Ref Range Status   04/28/2022 13.0 12.3 - 15.4 % Final     RDW-SD   Date Value Ref Range Status   04/28/2022 42.6 37.0 - 54.0 fl Final     MPV   Date Value Ref Range Status   04/28/2022 9.4 6.0 - 12.0 fL Final     Platelets   Date Value Ref Range Status   04/28/2022 339 140 - 450 10*3/mm3 Final     Neutrophil %   Date Value Ref Range Status   04/28/2022 57.9 42.7 - 76.0 % Final     Lymphocyte %   Date Value Ref Range Status   04/28/2022 29.3 19.6 - 45.3 % Final     Monocyte %   Date Value Ref Range Status   04/28/2022 10.0 5.0 - 12.0 % Final     Eosinophil %   Date Value Ref Range Status   04/28/2022 2.3 0.3 - 6.2 % Final     Basophil %   Date Value Ref Range Status   04/28/2022 0.5 0.0 - 1.5 % Final     Neutrophils, Absolute   Date Value Ref Range Status   04/28/2022 4.80 1.70 - 7.00 10*3/mm3 Final     Lymphocytes, Absolute   Date Value Ref Range Status   04/28/2022 2.43 0.70 - 3.10 10*3/mm3 Final     Monocytes, Absolute   Date Value Ref Range Status   04/28/2022 0.83 0.10 - 0.90 10*3/mm3 Final     Eosinophils, Absolute   Date Value Ref Range Status   04/28/2022 0.19 0.00 - 0.40 10*3/mm3 Final     Basophils, Absolute   Date Value Ref Range Status   04/28/2022 0.04 0.00 - 0.20 10*3/mm3 Final       Lab Results   Component Value Date    GLUCOSE 96 01/27/2022    BUN 20 01/27/2022    CREATININE 0.80 01/27/2022    EGFRIFNONA 82 01/27/2022    EGFRIFAFRI 95 01/27/2022    BCR 25 (H) 01/27/2022    K 4.2 01/27/2022    CO2 24 01/27/2022    CALCIUM 9.6 01/27/2022    PROTENTOTREF 7.6 02/28/2022    ALBUMIN 4.3  02/28/2022    LABIL2 1.8 01/27/2022    AST 19 02/28/2022    ALT 15 02/28/2022         ASSESSMENT:    1. 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  Ongoing surveillance  2. She has completed accelerated partial breast radiation under the care of Dr. Pickett.  3. Low recurrence score on Oncotype DX assay.     4. Family history of three relatives with breast cancer including patient, worrisome for hereditary breast cancer syndrome.   5. Negative for deleterious mutation, but patient was found to have variant of unknown significance in the APC and BARD-1 genes.  6. Status post EGD which showed erosive gastritis, Manuel erosions and duodenal ulcer in February 2016.  Status post colonoscopy which showed tubular adenoma in February 2016.  Scheduled for repeat EGD colonoscopy January 2021 by Dr. Daniels  7. Long history of hematuria.  Follows  up with urology  8. Status post Tamoxifen 12/22/14 to 12/13/17.  Arimidex initiated 12/13/17.  Monitoring for side effects.  Patient to continue treatment  9. Recurrent anemia due to iron deficiency  10. Assessment has been reviewed and updated    PLANS:    She has discontinued oral iron  Her hemoglobin is normal  CMP today  Bone density Feb 2022 was normal. Next bone density will be due Feb 2024    She will continue with Arimidex and Os-Brenden D.  I have reviewed this with patient    Her bilateral diagnostic mammogram will be due June 2022: Will  Order today    Continue monthly breast self-exam and call for lumps, nipple discharge, skin discoloration or breast pain.  She will continue Arimidex and Os-Brenden D.  Reviewed    CBC reviewed  CMP ordered           I have reviewed labs results, imaging, vitals, and medications with the patient today. Will follow up in 6 months with me.         Patient verbalized understanding and is in agreement of the above  plan.    I spent 30 total minutes, face-to-face, caring for Lisseth today.  90% of this time involved counseling and/or coordination of care as documented within this note.

## 2022-05-16 ENCOUNTER — HOSPITAL ENCOUNTER (OUTPATIENT)
Dept: ULTRASOUND IMAGING | Facility: HOSPITAL | Age: 57
Discharge: HOME | End: 2022-05-16
Admitting: NURSE PRACTITIONER

## 2022-05-16 DIAGNOSIS — E04.1 THYROID NODULE: ICD-10-CM

## 2022-05-16 PROCEDURE — 76536 US EXAM OF HEAD AND NECK: CPT

## 2022-05-18 ENCOUNTER — TELEPHONE (OUTPATIENT)
Dept: FAMILY MEDICINE CLINIC | Facility: CLINIC | Age: 57
End: 2022-05-18

## 2022-05-19 DIAGNOSIS — E04.1 THYROID NODULE: Primary | ICD-10-CM

## 2022-05-24 ENCOUNTER — TELEPHONE (OUTPATIENT)
Dept: ENDOCRINOLOGY | Facility: CLINIC | Age: 57
End: 2022-05-24

## 2022-06-09 ENCOUNTER — HOSPITAL ENCOUNTER (OUTPATIENT)
Dept: MAMMOGRAPHY | Facility: HOSPITAL | Age: 57
Discharge: HOME | End: 2022-06-09

## 2022-06-09 ENCOUNTER — HOSPITAL ENCOUNTER (OUTPATIENT)
Dept: ULTRASOUND IMAGING | Facility: HOSPITAL | Age: 57
Discharge: HOME | End: 2022-06-09

## 2022-06-09 DIAGNOSIS — C50.919 MALIGNANT NEOPLASM OF BREAST IN FEMALE, ESTROGEN RECEPTOR POSITIVE, UNSPECIFIED LATERALITY, UNSPECIFIED SITE OF BREAST: ICD-10-CM

## 2022-06-09 DIAGNOSIS — Z17.0 MALIGNANT NEOPLASM OF BREAST IN FEMALE, ESTROGEN RECEPTOR POSITIVE, UNSPECIFIED LATERALITY, UNSPECIFIED SITE OF BREAST: ICD-10-CM

## 2022-06-09 PROCEDURE — 77066 DX MAMMO INCL CAD BI: CPT

## 2022-06-09 PROCEDURE — G0279 TOMOSYNTHESIS, MAMMO: HCPCS

## 2022-09-06 RX ORDER — PANTOPRAZOLE SODIUM 40 MG/1
TABLET, DELAYED RELEASE ORAL
Qty: 90 TABLET | Refills: 3 | Status: SHIPPED | OUTPATIENT
Start: 2022-09-06

## 2022-09-13 ENCOUNTER — OFFICE VISIT (OUTPATIENT)
Dept: FAMILY MEDICINE CLINIC | Facility: CLINIC | Age: 57
End: 2022-09-13

## 2022-09-13 VITALS
DIASTOLIC BLOOD PRESSURE: 74 MMHG | SYSTOLIC BLOOD PRESSURE: 119 MMHG | HEART RATE: 97 BPM | HEIGHT: 65 IN | TEMPERATURE: 97.7 F | BODY MASS INDEX: 30.32 KG/M2 | WEIGHT: 182 LBS | RESPIRATION RATE: 16 BRPM | OXYGEN SATURATION: 97 %

## 2022-09-13 DIAGNOSIS — E66.9 CLASS 1 OBESITY WITH BODY MASS INDEX (BMI) OF 30.0 TO 30.9 IN ADULT, UNSPECIFIED OBESITY TYPE, UNSPECIFIED WHETHER SERIOUS COMORBIDITY PRESENT: ICD-10-CM

## 2022-09-13 DIAGNOSIS — E04.2 MULTINODULAR THYROID: ICD-10-CM

## 2022-09-13 DIAGNOSIS — H69.83 ETD (EUSTACHIAN TUBE DYSFUNCTION), BILATERAL: ICD-10-CM

## 2022-09-13 DIAGNOSIS — L30.9 ACUTE DERMATITIS: ICD-10-CM

## 2022-09-13 DIAGNOSIS — J30.1 SEASONAL ALLERGIC RHINITIS DUE TO POLLEN: ICD-10-CM

## 2022-09-13 DIAGNOSIS — I10 PRIMARY HYPERTENSION: Primary | ICD-10-CM

## 2022-09-13 PROCEDURE — 99214 OFFICE O/P EST MOD 30 MIN: CPT | Performed by: NURSE PRACTITIONER

## 2022-09-13 RX ORDER — FLUTICASONE PROPIONATE 50 MCG
SPRAY, SUSPENSION (ML) NASAL
Qty: 15.8 ML | Refills: 11 | Status: SHIPPED | OUTPATIENT
Start: 2022-09-13

## 2022-09-13 RX ORDER — CETIRIZINE HYDROCHLORIDE 10 MG/1
10 TABLET ORAL DAILY
Qty: 90 TABLET | Refills: 3 | Status: SHIPPED | OUTPATIENT
Start: 2022-09-13

## 2022-09-13 RX ORDER — PREDNISONE 20 MG/1
20 TABLET ORAL 2 TIMES DAILY
Qty: 10 TABLET | Refills: 0 | Status: SHIPPED | OUTPATIENT
Start: 2022-09-13 | End: 2022-09-18

## 2022-09-13 NOTE — PROGRESS NOTES
Chief Complaint  Hypertension    Subjective          Lisseth Linder presents to [facility] for     Lisseth is a 57-year-old female who presents to the office to establish care with me.  She was previously a patient of Kari Lou NP.     Follow-up hypertension.  She was started on losartan 25 mg daily on 12/22/2021  Denies any side effects from medication.  Blood pressure under good control.       She tested positive for COVID last week.  Ears feel full bilaterally  No ear pain, no ear drainage  No sore throat  Runny nose (clear) and nasal congestion.  She takes zyrtec 10 mg daily    Redness and itching on both lower legs for about 2 months  She has tried lotions and OTC hydrocortisone   No one else with rash.    She had a thyroid ultrasound on 5/16/2022.  This ultrasound showed a multinodular thyroid gland without significant change.  It was recommended that she follow-up for repeat thyroid ultrasound in 1 year        Lisseth Linder  has a past medical history of Adenomatous colon polyp, Allergic rhinitis, Asthma, Breast cancer (HCC), Cholelithiasis, Constipation, Duodenal ulcer, Erosive gastritis, Hiatal hernia, History of breast cancer, Hypothyroidism, Iron deficiency anemia, Microscopic hematuria, Primary hypertension (12/22/2021), SELMA (stress urinary incontinence, female), and Thyroid nodule.      Review of Systems   Constitutional: Negative for fatigue and fever.   HENT: Positive for congestion, postnasal drip, rhinorrhea, sinus pain and sneezing. Negative for ear discharge, sore throat and trouble swallowing.    Respiratory: Negative for cough and shortness of breath.    Cardiovascular: Negative for chest pain, palpitations and leg swelling.   Gastrointestinal: Positive for abdominal pain. Negative for nausea and vomiting.        No heartburn   Skin: Positive for rash (lower legs).   Allergic/Immunologic: Positive for environmental allergies.        Family History    Problem Relation Age of Onset   • Kidney cancer Father    • Prostate cancer Father    • Breast cancer Maternal Aunt    • Prostate cancer Paternal Aunt    • Lung cancer Paternal Uncle    • Diabetes Mother    • Hypertension Mother    • Hyperlipidemia Mother    • Heart disease Other         Grandparents   • Breast cancer Cousin         Past Surgical History:   Procedure Laterality Date   • AUGMENTATION MAMMAPLASTY  1989   • BREAST LUMPECTOMY Left 2014   • BREAST RECONSTRUCTION Left 2014   • ENDOSCOPY     • SENTINEL LYMPH NODE BIOPSY          Social History     Socioeconomic History   • Marital status:    Tobacco Use   • Smoking status: Never Smoker   • Smokeless tobacco: Never Used   Vaping Use   • Vaping Use: Never used   Substance and Sexual Activity   • Alcohol use: No   • Drug use: No   • Sexual activity: Defer        Objective       Current Outpatient Medications:   •  albuterol sulfate  (90 Base) MCG/ACT inhaler, Inhale 2 puffs., Disp: , Rfl:   •  anastrozole (ARIMIDEX) 1 MG tablet, TAKE 1 TABLET DAILY, Disp: 90 tablet, Rfl: 3  •  azelastine (ASTELIN) 0.1 % nasal spray, 2 sprays 2 (Two) Times a Day. as directed, Disp: , Rfl:   •  Calcium Carbonate-Vitamin D 600-200 MG-UNIT tablet, 2 (Two) Times a Day., Disp: , Rfl:   •  losartan (Cozaar) 25 MG tablet, Take 1 tablet by mouth Daily., Disp: 90 tablet, Rfl: 3  •  Multiple Vitamin (MULTI-VITAMIN) tablet, MULTI-VITAMIN TABS, Disp: , Rfl:   •  pantoprazole (PROTONIX) 40 MG EC tablet, TAKE 1 TABLET DAILY, Disp: 90 tablet, Rfl: 3  •  cetirizine (zyrTEC) 10 MG tablet, Take 1 tablet by mouth Daily. At bedtime for allergies, Disp: 90 tablet, Rfl: 3  •  fluticasone (FLONASE) 50 MCG/ACT nasal spray, 2 sprays in each nostril once daily for allergies, Disp: 15.8 mL, Rfl: 11  •  predniSONE (DELTASONE) 20 MG tablet, Take 1 tablet by mouth 2 (Two) Times a Day for 5 days., Disp: 10 tablet, Rfl: 0    Vital Signs:      /74 (BP Location: Right arm, Patient  "Position: Sitting, Cuff Size: Adult)   Pulse 97   Temp 97.7 °F (36.5 °C) (Infrared)   Resp 16   Ht 165.1 cm (65\")   Wt 82.6 kg (182 lb)   SpO2 97%   BMI 30.29 kg/m²     Vitals:    09/13/22 1446   BP: 119/74   BP Location: Right arm   Patient Position: Sitting   Cuff Size: Adult   Pulse: 97   Resp: 16   Temp: 97.7 °F (36.5 °C)   TempSrc: Infrared   SpO2: 97%   Weight: 82.6 kg (182 lb)   Height: 165.1 cm (65\")      Physical Exam  Vitals reviewed.   Constitutional:       General: She is not in acute distress.     Appearance: Normal appearance. She is obese.   HENT:      Head: Normocephalic and atraumatic.      Right Ear: Ear canal and external ear normal. A middle ear effusion is present. Tympanic membrane is retracted.      Left Ear: Ear canal and external ear normal. A middle ear effusion is present. Tympanic membrane is retracted.      Nose: Congestion and rhinorrhea present.      Mouth/Throat:      Mouth: Mucous membranes are moist.      Pharynx: Oropharynx is clear.   Eyes:      General: No scleral icterus.     Conjunctiva/sclera: Conjunctivae normal.   Cardiovascular:      Rate and Rhythm: Normal rate and regular rhythm.      Heart sounds: Normal heart sounds.   Pulmonary:      Effort: Pulmonary effort is normal.      Breath sounds: Normal breath sounds. No wheezing.   Musculoskeletal:      Cervical back: Neck supple.      Right lower leg: No edema.      Left lower leg: No edema.   Lymphadenopathy:      Cervical: No cervical adenopathy.   Skin:     General: Skin is warm and dry.      Findings: Rash (erythema on bilateral anterior lower legs) present.   Neurological:      Mental Status: She is alert and oriented to person, place, and time.   Psychiatric:         Mood and Affect: Mood normal.        Result Review :                :                 Assessment and Plan    Diagnoses and all orders for this visit:    1. Primary hypertension (Primary)  Assessment & Plan:  Continue losartan 25 mg daily      2. " Multinodular thyroid  Assessment & Plan:  Repeat a thyroid ultrasound in May 2023.      3. Seasonal allergic rhinitis due to pollen  Overview:  She has seen an allergist in the past and took allergy shots in the past.    Assessment & Plan:  Continue Zyrtec 10 mg daily and restart Flonase nasal spray 2 sprays each nostril once daily    Orders:  -     cetirizine (zyrTEC) 10 MG tablet; Take 1 tablet by mouth Daily. At bedtime for allergies  Dispense: 90 tablet; Refill: 3  -     fluticasone (FLONASE) 50 MCG/ACT nasal spray; 2 sprays in each nostril once daily for allergies  Dispense: 15.8 mL; Refill: 11    4. ETD (Eustachian tube dysfunction), bilateral  -     predniSONE (DELTASONE) 20 MG tablet; Take 1 tablet by mouth 2 (Two) Times a Day for 5 days.  Dispense: 10 tablet; Refill: 0    5. Acute dermatitis  Comments:  Apply over-the-counter hydrocortisone cream to legs as directed on container    6. Class 1 obesity with body mass index (BMI) of 30.0 to 30.9 in adult, unspecified obesity type, unspecified whether serious comorbidity present           Follow Up   Return in about 3 months (around 12/13/2022) for Annual physical.  Follow up HTN.  Labwork.  Patient was given instructions and counseling regarding her condition or for health maintenance advice. Please see specific information pulled into the AVS if appropriate.    There are no Patient Instructions on file for this visit.

## 2022-09-15 ENCOUNTER — TELEPHONE (OUTPATIENT)
Dept: FAMILY MEDICINE CLINIC | Facility: CLINIC | Age: 57
End: 2022-09-15

## 2022-09-15 NOTE — TELEPHONE ENCOUNTER
PATIENT CALLED AND STATES SHE WAS GIVEN A PREDNISONE. SHE HAS DEVELOPED[ED A YEAST INFECTION. IT USUALLY TAKES 2 TABLETS TO CLEAR IT UP. SHE ALSO WANTS TO KNOW IF SHE IS ABLE TO TAKE  PROMETHAZINE DM WITH THE STEROID. SHE HAS DEVELOPED A COUGH YESTERDAY.    PLEASE CALL AND ADVISE [phone]    [id number] - [place], [place] - [address] AT [facility] OF  &  - [phone]  - [phone] FX  [phone]

## 2022-09-20 NOTE — TELEPHONE ENCOUNTER
"Called pt, left VM - relayed message per Susanne:    \"Yes she can take promethazine DM with a steroid.\"    HUB to relay message if pt calls back   "

## 2022-10-27 ENCOUNTER — OFFICE VISIT (OUTPATIENT)
Dept: ONCOLOGY | Facility: CLINIC | Age: 57
End: 2022-10-27

## 2022-10-27 ENCOUNTER — LAB (OUTPATIENT)
Dept: LAB | Facility: HOSPITAL | Age: 57
End: 2022-10-27

## 2022-10-27 VITALS
HEART RATE: 98 BPM | WEIGHT: 195 LBS | DIASTOLIC BLOOD PRESSURE: 87 MMHG | RESPIRATION RATE: 18 BRPM | TEMPERATURE: 96.8 F | SYSTOLIC BLOOD PRESSURE: 138 MMHG | HEIGHT: 65 IN | BODY MASS INDEX: 32.49 KG/M2

## 2022-10-27 DIAGNOSIS — D50.0 IRON DEFICIENCY ANEMIA DUE TO CHRONIC BLOOD LOSS: ICD-10-CM

## 2022-10-27 DIAGNOSIS — Z17.0 MALIGNANT NEOPLASM OF BREAST IN FEMALE, ESTROGEN RECEPTOR POSITIVE, UNSPECIFIED LATERALITY, UNSPECIFIED SITE OF BREAST: Primary | ICD-10-CM

## 2022-10-27 DIAGNOSIS — C50.919 MALIGNANT NEOPLASM OF BREAST IN FEMALE, ESTROGEN RECEPTOR POSITIVE, UNSPECIFIED LATERALITY, UNSPECIFIED SITE OF BREAST: Primary | ICD-10-CM

## 2022-10-27 LAB
ALBUMIN SERPL-MCNC: 4.7 G/DL (ref 3.5–5.2)
ALBUMIN/GLOB SERPL: 1.4 G/DL
ALP SERPL-CCNC: 108 U/L (ref 39–117)
ALT SERPL W P-5'-P-CCNC: 16 U/L (ref 1–33)
ANION GAP SERPL CALCULATED.3IONS-SCNC: 13 MMOL/L (ref 5–15)
AST SERPL-CCNC: 16 U/L (ref 1–32)
BASOPHILS # BLD AUTO: 0.02 10*3/MM3 (ref 0–0.2)
BASOPHILS NFR BLD AUTO: 0.2 % (ref 0–1.5)
BILIRUB SERPL-MCNC: 0.4 MG/DL (ref 0–1.2)
BUN SERPL-MCNC: 16 MG/DL (ref 6–20)
BUN/CREAT SERPL: 23.5 (ref 7–25)
CALCIUM SPEC-SCNC: 10.5 MG/DL (ref 8.6–10.5)
CHLORIDE SERPL-SCNC: 99 MMOL/L (ref 98–107)
CO2 SERPL-SCNC: 27 MMOL/L (ref 22–29)
CREAT SERPL-MCNC: 0.68 MG/DL (ref 0.57–1)
DEPRECATED RDW RBC AUTO: 42.3 FL (ref 37–54)
EGFRCR SERPLBLD CKD-EPI 2021: 101.1 ML/MIN/1.73
EOSINOPHIL # BLD AUTO: 0.16 10*3/MM3 (ref 0–0.4)
EOSINOPHIL NFR BLD AUTO: 2 % (ref 0.3–6.2)
ERYTHROCYTE [DISTWIDTH] IN BLOOD BY AUTOMATED COUNT: 12.9 % (ref 12.3–15.4)
GLOBULIN UR ELPH-MCNC: 3.3 GM/DL
GLUCOSE SERPL-MCNC: 90 MG/DL (ref 65–99)
HCT VFR BLD AUTO: 41.5 % (ref 34–46.6)
HGB BLD-MCNC: 13.7 G/DL (ref 12–15.9)
HOLD SPECIMEN: NORMAL
HOLD SPECIMEN: NORMAL
LYMPHOCYTES # BLD AUTO: 2.69 10*3/MM3 (ref 0.7–3.1)
LYMPHOCYTES NFR BLD AUTO: 33 % (ref 19.6–45.3)
MCH RBC QN AUTO: 30 PG (ref 26.6–33)
MCHC RBC AUTO-ENTMCNC: 33 G/DL (ref 31.5–35.7)
MCV RBC AUTO: 91 FL (ref 79–97)
MONOCYTES # BLD AUTO: 0.77 10*3/MM3 (ref 0.1–0.9)
MONOCYTES NFR BLD AUTO: 9.5 % (ref 5–12)
NEUTROPHILS NFR BLD AUTO: 4.5 10*3/MM3 (ref 1.7–7)
NEUTROPHILS NFR BLD AUTO: 55.3 % (ref 42.7–76)
PLATELET # BLD AUTO: 320 10*3/MM3 (ref 140–450)
PMV BLD AUTO: 9.7 FL (ref 6–12)
POTASSIUM SERPL-SCNC: 4.1 MMOL/L (ref 3.5–5.2)
PROT SERPL-MCNC: 8 G/DL (ref 6–8.5)
RBC # BLD AUTO: 4.56 10*6/MM3 (ref 3.77–5.28)
SODIUM SERPL-SCNC: 139 MMOL/L (ref 136–145)
WBC NRBC COR # BLD: 8.14 10*3/MM3 (ref 3.4–10.8)

## 2022-10-27 PROCEDURE — 80053 COMPREHEN METABOLIC PANEL: CPT

## 2022-10-27 PROCEDURE — 85025 COMPLETE CBC W/AUTO DIFF WBC: CPT

## 2022-10-27 PROCEDURE — 36415 COLL VENOUS BLD VENIPUNCTURE: CPT

## 2022-10-27 PROCEDURE — 99214 OFFICE O/P EST MOD 30 MIN: CPT | Performed by: INTERNAL MEDICINE

## 2022-10-27 NOTE — PROGRESS NOTES
Hematology/Oncology Outpatient Follow Up    PATIENT NAME:Lisseth Linder  :1965  MRN: [id number]  PRIMARY CARE PHYSICIAN: Susanne Avelar APRN  REFERRING PHYSICIAN: Susanne Avelar APRN    Chief Complaint   Patient presents with   • Follow-up     Malignant neoplasm of breast in female, estrogen receptor positive, unspecified laterality, unspecified site of breast (HCC)        HISTORY OF PRESENT ILLNESS:     This is a 54-year-old female who was originally seen on 10/6/14.  • Patient’s original screening mammogram, not available.   • 14, she underwent needle localized excisional biopsy of the left breast mass and sentinel lymph node biopsy.  Tumor measured 1.1 cm, negative surgical margins.  ER positive, OH positive, HER-2/jamir negative.  • 14 - She had Oncotype DX assay performed on the tumor, which returned with a recurrent score of 10, which corresponds to 7% risk of distant recurrence following five years of Tamoxifen over a course of 10 years.  This places patient at low-risk disease.  ER was positive, OH positive and HER-2/jamir was negative on the validation assay.    • 14 - Patient completed accelerated breast radiation under the care of Dr. Pickett.  • 14 - Patient had a comprehensive genetic analysis with [facility].  This returned with no significant mutation identified, but she was found to have variant of unknown significance in the APC and BARD-1 genes.      • 14 - Patient initiated on Tamoxifen 20 mg daily.   • 6/8/15 - Bilateral digital diagnostic mammogram which showed fatty breasts and postsurgical changes on the left breast, otherwise negative.  Follow up in one year was recommended.  She also had an ultrasound of the left breast which did not show any abnormalities.   • 2/10/16 - EGD and colonoscopy were done.  On the EGD she was found to have erosive gastritis, Manuel erosions and a duodenal ulcer.  Colonoscopy showed tubular adenoma.   • 16 - Follow up  mammogram was essentially normal with stable post-op changes in the left breast.  Follow up in one year was recommended.    • 12/28/16 - Anemia work up revealed reticulocyte count (N), ferritin (L) 4, folate > 24, haptoglobin (N) 145, , iron binding capacity (H) 555.  SPEP with immunofixation assay did not reveal any monoclonal protein.  B12 (N) 353.  Methylmalonic acid level (N) 244.    • 1/11/16 - Urinalysis done showed small blood.  Urine culture did not grow any significant organisms.     • 6/16/17 - Bilateral mammogram with tomosynthesis was a stable exam without any abnormalities detected.    • 8/23/17 - Chemistry panel:  BUN 13, creatinine 0.7, liver function tests (N).  Estradiol level < 20.  FHS 31.5, also in the postmenopausal range.    • December 2017 - Patient received Injectafer for iron deficiency anemia.  Received 750 mg Injectafer day 1 and day 8.  • December 2017 - Ferritin 10.    • 12/13/17 - Tamoxifen discontinued.  Arimidex 1 mg daily and Os-Brenden D were initiated.    • 1/24/18 - DEXA scan, normal.    • 6/19/18 - Patient had bilateral diagnostic mammogram with tomosynthesis.  No mammographic evidence of malignancy.  Follow up in one year was recommended.    • 8/8/18 - Ferritin level was 227.  BUN 13.  Creatinine 0.6.   • 6/14/2019 patient had bilateral diagnostic mammogram with tomosynthesis there is no mammographic evidence of malignancy follow-up in 1 year was recommended.  • 2/4/2020-patient had a DEXA scan which showed a normal bone density  • February 2020 patient had anemia work-up which showed persistent iron deficiency with a ferritin level of 4.  Patient was referred back to [facility] and is to receive  IV Injectafer but due to the pandemic she canceled her appointments  • 5/18/2020 patient had bilateral screening mammogram, there was no mammographic evidence of malignancy.  Follow-up in 1 year was recommended  • HPI has been reviewed      Past Medical History:   Diagnosis Date   •  Adenomatous colon polyp    • Allergic rhinitis    • Asthma    • Breast cancer (HCC)    • Cholelithiasis    • Constipation    • Duodenal ulcer    • Erosive gastritis    • Hiatal hernia    • History of breast cancer    • Hypothyroidism    • Iron deficiency anemia    • Microscopic hematuria    • Primary hypertension 12/22/2021   • SELMA (stress urinary incontinence, female)    • Thyroid nodule        Past Surgical History:   Procedure Laterality Date   • AUGMENTATION MAMMAPLASTY  1989   • BREAST LUMPECTOMY Left 2014   • BREAST RECONSTRUCTION Left 2014   • ENDOSCOPY     • SENTINEL LYMPH NODE BIOPSY           Current Outpatient Medications:   •  albuterol sulfate  (90 Base) MCG/ACT inhaler, Inhale 2 puffs., Disp: , Rfl:   •  anastrozole (ARIMIDEX) 1 MG tablet, TAKE 1 TABLET DAILY, Disp: 90 tablet, Rfl: 3  •  azelastine (ASTELIN) 0.1 % nasal spray, 2 sprays 2 (Two) Times a Day. as directed, Disp: , Rfl:   •  Calcium Carbonate-Vitamin D 600-200 MG-UNIT tablet, 2 (Two) Times a Day., Disp: , Rfl:   •  cetirizine (zyrTEC) 10 MG tablet, Take 1 tablet by mouth Daily. At bedtime for allergies, Disp: 90 tablet, Rfl: 3  •  fluticasone (FLONASE) 50 MCG/ACT nasal spray, 2 sprays in each nostril once daily for allergies, Disp: 15.8 mL, Rfl: 11  •  losartan (Cozaar) 25 MG tablet, Take 1 tablet by mouth Daily., Disp: 90 tablet, Rfl: 3  •  Multiple Vitamin (MULTI-VITAMIN) tablet, MULTI-VITAMIN TABS, Disp: , Rfl:   •  pantoprazole (PROTONIX) 40 MG EC tablet, TAKE 1 TABLET DAILY, Disp: 90 tablet, Rfl: 3    No Known Allergies    Family History   Problem Relation Age of Onset   • Kidney cancer Father    • Prostate cancer Father    • Breast cancer Maternal Aunt    • Prostate cancer Paternal Aunt    • Lung cancer Paternal Uncle    • Diabetes Mother    • Hypertension Mother    • Hyperlipidemia Mother    • Heart disease Other         Grandparents   • Breast cancer Cousin        Cancer-related family history includes Breast cancer in her  "cousin and maternal aunt; Kidney cancer in her father; Lung cancer in her paternal uncle; Prostate cancer in her father and paternal aunt.    Social History     Tobacco Use   • Smoking status: Never   • Smokeless tobacco: Never   Vaping Use   • Vaping Use: Never used   Substance Use Topics   • Alcohol use: No   • Drug use: No     I have reviewed and confirmed the accuracy of the patient's history: Chief complaint, HPI, ROS and Subjective as entered by the MA/LPN/RN. Courtney Bautista MD 10/27/22       SUBJECTIVE:    Patient does not have any specific complaints today.  She performs breast exams and does not report any changes.    Patient is here today for routine follow-up        REVIEW OF SYSTEMS:    Review of Systems   Constitutional: Negative for chills and fever.   HENT: Negative for ear pain, mouth sores, nosebleeds and sore throat.    Eyes: Negative for photophobia and visual disturbance.   Respiratory: Negative for wheezing and stridor.    Cardiovascular: Negative for chest pain and palpitations.   Gastrointestinal: Negative for abdominal pain, diarrhea, nausea and vomiting.   Endocrine: Negative for cold intolerance and heat intolerance.   Genitourinary: Negative for dysuria and hematuria.   Musculoskeletal: Negative for joint swelling and neck stiffness.   Skin: Negative for color change and rash.   Neurological: Negative for seizures and syncope.   Hematological: Negative for adenopathy.        No obvious bleeding   Psychiatric/Behavioral: Negative for agitation, confusion and hallucinations.       OBJECTIVE:    Vitals:    10/27/22 1252   BP: 138/87   Pulse: 98   Resp: 18   Temp: 96.8 °F (36 °C)   TempSrc: Infrared   Weight: 88.5 kg (195 lb)   Height: 165.1 cm (65\")   PainSc: 0-No pain       ECOG    (0) Fully active, able to carry on all predisease performance without restriction    Physical Exam   Constitutional: She is oriented to person, place, and time. No distress.   HENT:   Head: Normocephalic " and atraumatic.   Eyes: Conjunctivae are normal. Right eye exhibits no discharge. Left eye exhibits no discharge. No scleral icterus.   Neck: No thyromegaly present.   Cardiovascular: Normal rate, regular rhythm and normal heart sounds. Exam reveals no gallop and no friction rub.   Pulmonary/Chest: Effort normal. No stridor. No respiratory distress. She has no wheezes. Right breast exhibits tenderness. Right breast exhibits no inverted nipple, no mass, no nipple discharge and no skin change. Left breast exhibits no inverted nipple, no mass, no nipple discharge, no skin change and no tenderness. No breast bleeding. Breasts are symmetrical.   Bilateral breast exam was negative  Bilateral axillary exam was negative   Abdominal: Soft. Bowel sounds are normal. She exhibits no mass. There is no abdominal tenderness. There is no rebound and no guarding.   Musculoskeletal: Normal range of motion. No tenderness.   Lymphadenopathy:     She has no cervical adenopathy.   Neurological: She is alert and oriented to person, place, and time. She exhibits normal muscle tone.   Skin: Skin is warm. No rash noted. She is not diaphoretic. No erythema.   Psychiatric: Her behavior is normal.   Nursing note and vitals reviewed.    Physical exam as documented above    I have reexamined the patient and the results are consistent with the previously documented exam. Courtney Bautista MD   RECENT LABS    WBC   Date Value Ref Range Status   10/27/2022 8.14 3.40 - 10.80 10*3/mm3 Final   12/22/2021 7.9 3.4 - 10.8 x10E3/uL Final     RBC   Date Value Ref Range Status   10/27/2022 4.56 3.77 - 5.28 10*6/mm3 Final   12/22/2021 4.57 3.77 - 5.28 x10E6/uL Final     Hemoglobin   Date Value Ref Range Status   10/27/2022 13.7 12.0 - 15.9 g/dL Final     Hematocrit   Date Value Ref Range Status   10/27/2022 41.5 34.0 - 46.6 % Final     MCV   Date Value Ref Range Status   10/27/2022 91.0 79.0 - 97.0 fL Final     MCH   Date Value Ref Range Status    10/27/2022 30.0 26.6 - 33.0 pg Final     MCHC   Date Value Ref Range Status   10/27/2022 33.0 31.5 - 35.7 g/dL Final     RDW   Date Value Ref Range Status   10/27/2022 12.9 12.3 - 15.4 % Final     RDW-SD   Date Value Ref Range Status   10/27/2022 42.3 37.0 - 54.0 fl Final     MPV   Date Value Ref Range Status   10/27/2022 9.7 6.0 - 12.0 fL Final     Platelets   Date Value Ref Range Status   10/27/2022 320 140 - 450 10*3/mm3 Final     Neutrophil %   Date Value Ref Range Status   10/27/2022 55.3 42.7 - 76.0 % Final     Lymphocyte %   Date Value Ref Range Status   10/27/2022 33.0 19.6 - 45.3 % Final     Monocyte %   Date Value Ref Range Status   10/27/2022 9.5 5.0 - 12.0 % Final     Eosinophil %   Date Value Ref Range Status   10/27/2022 2.0 0.3 - 6.2 % Final     Basophil %   Date Value Ref Range Status   10/27/2022 0.2 0.0 - 1.5 % Final     Neutrophils, Absolute   Date Value Ref Range Status   10/27/2022 4.50 1.70 - 7.00 10*3/mm3 Final     Lymphocytes, Absolute   Date Value Ref Range Status   10/27/2022 2.69 0.70 - 3.10 10*3/mm3 Final     Monocytes, Absolute   Date Value Ref Range Status   10/27/2022 0.77 0.10 - 0.90 10*3/mm3 Final     Eosinophils, Absolute   Date Value Ref Range Status   10/27/2022 0.16 0.00 - 0.40 10*3/mm3 Final     Basophils, Absolute   Date Value Ref Range Status   10/27/2022 0.02 0.00 - 0.20 10*3/mm3 Final       Lab Results   Component Value Date    GLUCOSE 90 10/27/2022    BUN 16 10/27/2022    CREATININE 0.68 10/27/2022    EGFRIFNONA 82 01/27/2022    EGFRIFAFRI 95 01/27/2022    BCR 23.5 10/27/2022    K 4.1 10/27/2022    CO2 27.0 10/27/2022    CALCIUM 10.5 10/27/2022    PROTENTOTREF 7.6 02/28/2022    ALBUMIN 4.70 10/27/2022    LABIL2 1.8 01/27/2022    AST 16 10/27/2022    ALT 16 10/27/2022         ASSESSMENT:    1. 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   Ongoing surveillance management  2. She has completed accelerated partial breast radiation under the care of Dr. Pickett.  3. Low recurrence score on Oncotype DX assay.     4. Family history of three relatives with breast cancer including patient, worrisome for hereditary breast cancer syndrome.   5. Negative for deleterious mutation, but patient was found to have variant of unknown significance in the APC and BARD-1 genes.  6. Status post EGD which showed erosive gastritis, Manuel erosions and duodenal ulcer in February 2016.  Status post colonoscopy which showed tubular adenoma in February 2016.  Scheduled for repeat EGD colonoscopy January 2021 by Dr. Daniels.  Hemoglobin is stable  7. Long history of hematuria.  Follows  up with urology  8. Status post Tamoxifen 12/22/14 to 12/13/17.  Arimidex initiated 12/13/17.  Monitoring for side effects.  Patient will continue Arimidex  9. Recurrent anemia due to iron deficiency  10. Assessment has been reviewed and updated    PLANS:    CBC reviewed and remained stable  CMP today  Bone density Feb 2022 was normal. Next bone density will be due Feb 2024    She will continue with Arimidex and Os-Brenden D.  Reviewed    Her bilateral diagnostic mammogram will be due June 2023:     Continue monthly breast self-exam and call for lumps, nipple discharge, skin discoloration or breast pain.  She will continue Arimidex and Os-Brenden D.  Reviewed               I have reviewed labs results, imaging, vitals, and medications with the patient today. Will follow up in 6 months with me.         Patient verbalized understanding and is in agreement of the above plan.      I spent 30 total minutes, face-to-face, caring for Lisseth today.  90% of this time involved counseling and/or coordination of care as documented within this note.

## 2022-11-14 ENCOUNTER — OFFICE VISIT (OUTPATIENT)
Dept: FAMILY MEDICINE CLINIC | Facility: CLINIC | Age: 57
End: 2022-11-14

## 2022-11-14 VITALS
BODY MASS INDEX: 29.99 KG/M2 | TEMPERATURE: 97.6 F | HEART RATE: 82 BPM | WEIGHT: 180 LBS | DIASTOLIC BLOOD PRESSURE: 78 MMHG | RESPIRATION RATE: 16 BRPM | SYSTOLIC BLOOD PRESSURE: 121 MMHG | OXYGEN SATURATION: 94 % | HEIGHT: 65 IN

## 2022-11-14 DIAGNOSIS — J40 BRONCHITIS: Primary | ICD-10-CM

## 2022-11-14 DIAGNOSIS — R05.1 ACUTE COUGH: ICD-10-CM

## 2022-11-14 DIAGNOSIS — J06.9 ACUTE URI: ICD-10-CM

## 2022-11-14 DIAGNOSIS — B37.31 VAGINAL YEAST INFECTION: ICD-10-CM

## 2022-11-14 LAB
EXPIRATION DATE: NORMAL
FLUAV AG UPPER RESP QL IA.RAPID: NOT DETECTED
FLUBV AG UPPER RESP QL IA.RAPID: NOT DETECTED
INTERNAL CONTROL: NORMAL
Lab: NORMAL
SARS-COV-2 AG UPPER RESP QL IA.RAPID: NOT DETECTED

## 2022-11-14 PROCEDURE — 99213 OFFICE O/P EST LOW 20 MIN: CPT | Performed by: NURSE PRACTITIONER

## 2022-11-14 PROCEDURE — 87428 SARSCOV & INF VIR A&B AG IA: CPT | Performed by: NURSE PRACTITIONER

## 2022-11-14 RX ORDER — FLUCONAZOLE 150 MG/1
150 TABLET ORAL ONCE
Qty: 1 TABLET | Refills: 0 | Status: SHIPPED | OUTPATIENT
Start: 2022-11-14 | End: 2022-11-14

## 2022-11-14 RX ORDER — AMOXICILLIN AND CLAVULANATE POTASSIUM 875; 125 MG/1; MG/1
1 TABLET, FILM COATED ORAL 2 TIMES DAILY
Qty: 20 TABLET | Refills: 0 | Status: SHIPPED | OUTPATIENT
Start: 2022-11-14 | End: 2022-11-24

## 2022-11-14 NOTE — PROGRESS NOTES
Chief Complaint  Nasal Congestion, Cough, and Generalized Body Aches    Subjective          Lisseth Linder presents to [facility] for Answers for HPI/ROS submitted by the patient on 11/14/2022  What is the primary reason for your visit?: Cough        Cough  This is a new problem. The current episode started 1 to 4 weeks ago. The problem has been gradually worsening. The problem occurs every few minutes. The cough is productive of purulent sputum. Associated symptoms include chest pain, chills, ear congestion, a fever, headaches, nasal congestion, postnasal drip, rhinorrhea, a sore throat, shortness of breath and wheezing. Pertinent negatives include no ear pain, heartburn, hemoptysis, myalgias, rash, sweats or weight loss. The symptoms are aggravated by lying down.    nasal symptom cough and congestion for several weeks    Patient started several weeks ago with the nasal congestion as well as a cough.  She reports she has a productive cough of yellow sputum.  She has had chills but no fever.  She occasionally hurts when she takes a deep breath but is not short of breath.  She has had some body aches.  She has a sore throat from the nasal drainage.  She is only taken Mucinex twice a day.  She is taken over-the-counter multi cold medicine at night.  No GI symptoms.  Took a negative COVID test over 2 days ago.      Lisseth Linder  has a past medical history of Adenomatous colon polyp, Allergic rhinitis, Asthma, Breast cancer (HCC), Cholelithiasis, Constipation, Duodenal ulcer, Erosive gastritis, Hiatal hernia, History of breast cancer, Hypothyroidism, Iron deficiency anemia, Microscopic hematuria, Primary hypertension (12/22/2021), SEMLA (stress urinary incontinence, female), and Thyroid nodule.      Review of Systems   Constitutional: Positive for chills and fever. Negative for weight loss.   HENT: Positive for postnasal drip, rhinorrhea and sore throat. Negative for ear pain.   "  Respiratory: Positive for cough, shortness of breath and wheezing. Negative for hemoptysis.    Cardiovascular: Positive for chest pain.   Gastrointestinal: Negative for heartburn.   Musculoskeletal: Negative for myalgias.   Skin: Negative for rash.   Neurological: Positive for headaches.        Objective       Current Outpatient Medications:   •  albuterol sulfate  (90 Base) MCG/ACT inhaler, Inhale 2 puffs., Disp: , Rfl:   •  anastrozole (ARIMIDEX) 1 MG tablet, TAKE 1 TABLET DAILY, Disp: 90 tablet, Rfl: 3  •  azelastine (ASTELIN) 0.1 % nasal spray, 2 sprays 2 (Two) Times a Day. as directed, Disp: , Rfl:   •  Calcium Carbonate-Vitamin D 600-200 MG-UNIT tablet, 2 (Two) Times a Day., Disp: , Rfl:   •  cetirizine (zyrTEC) 10 MG tablet, Take 1 tablet by mouth Daily. At bedtime for allergies, Disp: 90 tablet, Rfl: 3  •  fluticasone (FLONASE) 50 MCG/ACT nasal spray, 2 sprays in each nostril once daily for allergies, Disp: 15.8 mL, Rfl: 11  •  losartan (Cozaar) 25 MG tablet, Take 1 tablet by mouth Daily., Disp: 90 tablet, Rfl: 3  •  Multiple Vitamin (MULTI-VITAMIN) tablet, MULTI-VITAMIN TABS, Disp: , Rfl:   •  pantoprazole (PROTONIX) 40 MG EC tablet, TAKE 1 TABLET DAILY, Disp: 90 tablet, Rfl: 3  •  amoxicillin-clavulanate (Augmentin) 875-125 MG per tablet, Take 1 tablet by mouth 2 (Two) Times a Day for 10 days., Disp: 20 tablet, Rfl: 0  •  fluconazole (Diflucan) 150 MG tablet, Take 1 tablet by mouth 1 (One) Time for 1 dose., Disp: 1 tablet, Rfl: 0    Vital Signs:      /78 (BP Location: Left arm, Patient Position: Sitting, Cuff Size: Small Adult)   Pulse 82   Temp 97.6 °F (36.4 °C) (Infrared)   Resp 16   Ht 165.1 cm (65\")   Wt 81.6 kg (180 lb)   SpO2 94%   BMI 29.95 kg/m²     Vitals:    11/14/22 1022   BP: 121/78   BP Location: Left arm   Patient Position: Sitting   Cuff Size: Small Adult   Pulse: 82   Resp: 16   Temp: 97.6 °F (36.4 °C)   TempSrc: Infrared   SpO2: 94%   Weight: 81.6 kg (180 lb) " "  Height: 165.1 cm (65\")      Physical Exam  Vitals and nursing note reviewed.   Constitutional:       General: She is not in acute distress.     Appearance: Normal appearance. She is well-developed and normal weight.   HENT:      Head: Normocephalic and atraumatic.      Comments: Bilateral air-fluid levels     Right Ear: Tympanic membrane, ear canal and external ear normal.      Left Ear: Tympanic membrane, ear canal and external ear normal.      Nose: Nose normal.      Mouth/Throat:      Mouth: Mucous membranes are moist.      Dentition: Normal dentition.      Tongue: No lesions.      Pharynx: Uvula midline.      Comments: Posterior pharynx with thick drainage.  Eyes:      Conjunctiva/sclera: Conjunctivae normal.      Pupils: Pupils are equal, round, and reactive to light.   Cardiovascular:      Rate and Rhythm: Regular rhythm.      Heart sounds: Normal heart sounds. No murmur heard.    No friction rub. No gallop.   Pulmonary:      Effort: Pulmonary effort is normal.      Breath sounds: Normal breath sounds. No wheezing or rhonchi.   Abdominal:      General: Bowel sounds are normal. There is no distension.      Palpations: Abdomen is soft.      Tenderness: There is no abdominal tenderness.   Musculoskeletal:         General: Normal range of motion.      Cervical back: Normal range of motion and neck supple.   Skin:     General: Skin is warm and dry.   Neurological:      General: No focal deficit present.      Mental Status: She is alert.   Psychiatric:         Mood and Affect: Mood normal.         Behavior: Behavior normal.        Result Review :                  PHQ-9 Total Score:             Assessment and Plan    Diagnoses and all orders for this visit:    1. Bronchitis (Primary)  Comments:  Encouraged Mucinex and inhaler.  Antibiotic for productive yellow sputum.  If not better return    2. Acute cough  -     POCT SARS-CoV-2 Antigen MILES    3. Acute URI    4. Vaginal yeast infection  Comments:  Diflucan as " prescribed    Other orders  -     amoxicillin-clavulanate (Augmentin) 875-125 MG per tablet; Take 1 tablet by mouth 2 (Two) Times a Day for 10 days.  Dispense: 20 tablet; Refill: 0  -     fluconazole (Diflucan) 150 MG tablet; Take 1 tablet by mouth 1 (One) Time for 1 dose.  Dispense: 1 tablet; Refill: 0         Problem List Items Addressed This Visit    None  Visit Diagnoses     Bronchitis    -  Primary    Encouraged Mucinex and inhaler.  Antibiotic for productive yellow sputum.  If not better return    Acute cough        Acute URI        Relevant Medications    amoxicillin-clavulanate (Augmentin) 875-125 MG per tablet    Vaginal yeast infection        Diflucan as prescribed    Relevant Medications    fluconazole (Diflucan) 150 MG tablet          Follow Up   Return if symptoms worsen or fail to improve.  Patient was given instructions and counseling regarding her condition or for health maintenance advice. Please see specific information pulled into the AVS if appropriate.

## 2022-12-22 ENCOUNTER — OFFICE VISIT (OUTPATIENT)
Dept: FAMILY MEDICINE CLINIC | Facility: CLINIC | Age: 57
End: 2022-12-22

## 2022-12-22 VITALS
HEART RATE: 94 BPM | BODY MASS INDEX: 30.75 KG/M2 | SYSTOLIC BLOOD PRESSURE: 128 MMHG | DIASTOLIC BLOOD PRESSURE: 77 MMHG | TEMPERATURE: 97.6 F | HEIGHT: 65 IN | RESPIRATION RATE: 12 BRPM | WEIGHT: 184.6 LBS | OXYGEN SATURATION: 98 %

## 2022-12-22 DIAGNOSIS — E04.2 MULTINODULAR THYROID: ICD-10-CM

## 2022-12-22 DIAGNOSIS — E66.09 CLASS 1 OBESITY DUE TO EXCESS CALORIES WITH SERIOUS COMORBIDITY AND BODY MASS INDEX (BMI) OF 30.0 TO 30.9 IN ADULT: ICD-10-CM

## 2022-12-22 DIAGNOSIS — K44.9 HIATAL HERNIA: ICD-10-CM

## 2022-12-22 DIAGNOSIS — I10 PRIMARY HYPERTENSION: ICD-10-CM

## 2022-12-22 DIAGNOSIS — Z00.00 WELLNESS EXAMINATION: Primary | ICD-10-CM

## 2022-12-22 DIAGNOSIS — Z85.3 HISTORY OF BREAST CANCER: ICD-10-CM

## 2022-12-22 DIAGNOSIS — E55.9 VITAMIN D DEFICIENCY: ICD-10-CM

## 2022-12-22 PROBLEM — E66.811 CLASS 1 OBESITY DUE TO EXCESS CALORIES WITH SERIOUS COMORBIDITY AND BODY MASS INDEX (BMI) OF 30.0 TO 30.9 IN ADULT: Status: ACTIVE | Noted: 2022-12-22

## 2022-12-22 PROCEDURE — 99396 PREV VISIT EST AGE 40-64: CPT | Performed by: NURSE PRACTITIONER

## 2022-12-22 NOTE — PROGRESS NOTES
Chief Complaint  Annual Exam, Hypertension, and Thyroid Problem    Subjective          Lisseth Linder presents to [facility]     History of Present Illness  Lisseth is a 57 year old female.    Adult Annual Wellness    Social History  Tobacco use - never smoker  Alcohol use -  none  Drug use - none  Caffeine use - soda     General health habits  Last eye exam - about 2 years ago  Last dental exam - 1 year ago    Diet - Regular diet    Weight / BMI - obese, BMI 30.7    Exercise - no    Reproductive health -  Sexually active - yes  Birth control - post-menopause  LMP - age 49    Screening/prevention  Last mammogram:  22  Last pap smear/ cervical cancer screenin2021  DEXA scan: 22 normal  Colon cancer screening: colonoscopy 21  Immunizations: Tdap 18; flu 10/10/22      Patient presents for follow-up of HTN  This is an established problem.  Interim treatment changes: none  Current medications: losartan 25 mg daily     Patient presents for follow-up of multinodular thyroid  This is an established problem.  Interim treatment changes: none  Current medications: none  Last thyroid US 22.  Needs a follow up thyroid US in 1 year (May 2023)  Last TSH on 21    Hx breast cancer.  Sees oncology, Dr. Bautista  Mammogram due in 2023        Lisseth Linder  has a past medical history of Adenomatous colon polyp, Allergic rhinitis, Asthma, Breast cancer (HCC), Cholelithiasis, Constipation, Duodenal ulcer, Erosive gastritis, Hiatal hernia, History of breast cancer, Hypothyroidism, Iron deficiency anemia, Microscopic hematuria, Primary hypertension (2021), SELMA (stress urinary incontinence, female), and Thyroid nodule.      Review of Systems   Constitutional: Negative for fatigue and fever.   HENT: Negative for ear pain and sore throat.    Eyes: Negative for visual disturbance.   Respiratory: Negative for cough and shortness of breath.    Cardiovascular:  Negative for chest pain, palpitations and leg swelling.   Gastrointestinal: Negative for abdominal pain, blood in stool, constipation, diarrhea, nausea and vomiting.   Endocrine: Negative for cold intolerance and heat intolerance.   Genitourinary: Negative for frequency, hematuria, urgency and vaginal bleeding.   Musculoskeletal: Positive for back pain. Negative for arthralgias and myalgias.   Skin: Negative for rash.   Psychiatric/Behavioral: Negative for dysphoric mood and sleep disturbance. The patient is not nervous/anxious.         Family History   Problem Relation Age of Onset   • Kidney cancer Father    • Prostate cancer Father    • Cancer Father    • Breast cancer Maternal Aunt    • Prostate cancer Paternal Aunt    • Lung cancer Paternal Uncle    • Diabetes Mother    • Hypertension Mother    • Hyperlipidemia Mother    • Heart disease Other         Grandparents   • Breast cancer Cousin         Past Surgical History:   Procedure Laterality Date   • AUGMENTATION MAMMAPLASTY  1989   • BREAST LUMPECTOMY Left 2014   • BREAST RECONSTRUCTION Left 2014   • COLONOSCOPY  01/06/2021    normal; repeat 5 years; Dr. Daniels   • ENDOSCOPY  01/06/2021    Large 8 cm hiatal hernia; Dr. Daniels   • SENTINEL LYMPH NODE BIOPSY          Social History     Socioeconomic History   • Marital status:    Tobacco Use   • Smoking status: Never   • Smokeless tobacco: Never   Vaping Use   • Vaping Use: Never used   Substance and Sexual Activity   • Alcohol use: No   • Drug use: Never   • Sexual activity: Yes     Birth control/protection: Post-menopausal        Objective       Current Outpatient Medications:   •  albuterol sulfate  (90 Base) MCG/ACT inhaler, Inhale 2 puffs., Disp: , Rfl:   •  anastrozole (ARIMIDEX) 1 MG tablet, TAKE 1 TABLET DAILY, Disp: 90 tablet, Rfl: 3  •  azelastine (ASTELIN) 0.1 % nasal spray, 2 sprays 2 (Two) Times a Day. as directed, Disp: , Rfl:   •  Calcium Carbonate-Vitamin D 600-200 MG-UNIT tablet,  "2 (Two) Times a Day., Disp: , Rfl:   •  cetirizine (zyrTEC) 10 MG tablet, Take 1 tablet by mouth Daily. At bedtime for allergies, Disp: 90 tablet, Rfl: 3  •  fluticasone (FLONASE) 50 MCG/ACT nasal spray, 2 sprays in each nostril once daily for allergies, Disp: 15.8 mL, Rfl: 11  •  losartan (Cozaar) 25 MG tablet, Take 1 tablet by mouth Daily., Disp: 90 tablet, Rfl: 3  •  Multiple Vitamin (MULTI-VITAMIN) tablet, MULTI-VITAMIN TABS, Disp: , Rfl:   •  pantoprazole (PROTONIX) 40 MG EC tablet, TAKE 1 TABLET DAILY, Disp: 90 tablet, Rfl: 3    Vital Signs:      /77 (BP Location: Right arm, Patient Position: Sitting, Cuff Size: Adult)   Pulse 94   Temp 97.6 °F (36.4 °C) (Infrared)   Resp 12   Ht 165.1 cm (65\")   Wt 83.7 kg (184 lb 9.6 oz)   SpO2 98%   BMI 30.72 kg/m²     Vitals:    12/22/22 1516   BP: 128/77   BP Location: Right arm   Patient Position: Sitting   Cuff Size: Adult   Pulse: 94   Resp: 12   Temp: 97.6 °F (36.4 °C)   TempSrc: Infrared   SpO2: 98%   Weight: 83.7 kg (184 lb 9.6 oz)   Height: 165.1 cm (65\")      Physical Exam  Vitals reviewed.   Constitutional:       General: She is not in acute distress.     Appearance: Normal appearance.   HENT:      Head: Normocephalic and atraumatic.      Right Ear: Tympanic membrane, ear canal and external ear normal.      Left Ear: Tympanic membrane, ear canal and external ear normal.      Nose: Nose normal.      Mouth/Throat:      Mouth: Mucous membranes are moist.      Pharynx: Oropharynx is clear. No oropharyngeal exudate.   Eyes:      General: No scleral icterus.     Conjunctiva/sclera: Conjunctivae normal.   Neck:      Thyroid: No thyromegaly.   Cardiovascular:      Rate and Rhythm: Normal rate and regular rhythm.      Heart sounds: Normal heart sounds.   Pulmonary:      Effort: Pulmonary effort is normal. No respiratory distress.      Breath sounds: Normal breath sounds. No wheezing.   Abdominal:      General: Bowel sounds are normal. There is no distension. "      Palpations: Abdomen is soft. There is no mass.      Tenderness: There is no abdominal tenderness. There is no guarding or rebound.   Musculoskeletal:      Cervical back: Neck supple.      Right lower leg: No edema.      Left lower leg: No edema.   Lymphadenopathy:      Cervical: No cervical adenopathy.   Skin:     General: Skin is warm and dry.   Neurological:      Mental Status: She is alert and oriented to person, place, and time.   Psychiatric:         Mood and Affect: Mood normal.        Result Review :     CMP    CMP 2/28/22 4/28/22 10/27/22   Glucose  85 90   BUN  17 16   Creatinine  0.69 0.68   Sodium  139 139   Potassium  4.3 4.1   Chloride  102 99   Calcium  9.7 10.5   Total Protein 7.6     Albumin 4.3 4.40 4.70   Total Bilirubin 0.4 0.3 0.4   Alkaline Phosphatase 120 111 108   AST (SGOT) 19 16 16   ALT (SGPT) 15 14 16           CBC    CBC 4/28/22 10/27/22   WBC 8.29 8.14   RBC 4.26 4.56   Hemoglobin 12.9 13.7   Hematocrit 38.9 41.5   MCV 91.3 91.0   MCH 30.3 30.0   MCHC 33.2 33.0   RDW 13.0 12.9   Platelets 339 320                    PHQ-9 Depression Screening  Little interest or pleasure in doing things? 0-->not at all   Feeling down, depressed, or hopeless? 0-->not at all   Trouble falling or staying asleep, or sleeping too much?     Feeling tired or having little energy?     Poor appetite or overeating?     Feeling bad about yourself - or that you are a failure or have let yourself or your family down?     Trouble concentrating on things, such as reading the newspaper or watching television?     Moving or speaking so slowly that other people could have noticed? Or the opposite - being so fidgety or restless that you have been moving around a lot more than usual?     Thoughts that you would be better off dead, or of hurting yourself in some way?     PHQ-9 Total Score 0   If you checked off any problems, how difficult have these problems made it for you to do your work, take care of things at home,  or get along with other people?            Assessment and Plan    Diagnoses and all orders for this visit:    1. Wellness examination (Primary)  Assessment & Plan:  Discussed preventative health care.  Tdap and flu vaccines up-to-date.  Labs ordered.  Recommended annual eye and dental exams.     Orders:  -     CBC & Differential  -     TSH  -     T4, Free  -     Lipid Panel    2. Primary hypertension  Assessment & Plan:  Continue losartan 25 mg daily. Discussed goal systolic blood pressure is less than 120 and goal diastolic is less than 80.  Discussed non-pharmacologic treatment options to lower blood pressure: low-sodium diet, weight loss, exercise 10 to 15 minutes/day and consumption of alcohol in moderation.      3. Multinodular thyroid  Assessment & Plan:  Repeat a thyroid ultrasound in May 2023.  Check TSH, Free T4    Orders:  -     US Thyroid; Future    4. History of breast cancer  Assessment & Plan:  Follow-up with oncology, Dr. Bautista, as scheduled.  Mammogram is due in June 2023 and Dr. Bautista will order.      5. Hiatal hernia  Assessment & Plan:  Per EGD 8 cm hiatal hernia      6. Vitamin D deficiency  -     Vitamin D,25-Hydroxy    7. Class 1 obesity due to excess calories with serious comorbidity and body mass index (BMI) of 30.0 to 30.9 in adult  Assessment & Plan:  Patient's (Body mass index is 30.72 kg/m².) indicates that they are obese (BMI >30) with health conditions that include hypertension . Weight is unchanged. BMI  is above average; BMI management plan is completed. We discussed portion control and increasing exercise.              Follow Up   Return in about 6 months (around 6/22/2023) for Recheck.  Patient was given instructions and counseling regarding her condition or for health maintenance advice. Please see specific information pulled into the AVS if appropriate.    There are no Patient Instructions on file for this visit.

## 2022-12-23 LAB
25(OH)D3+25(OH)D2 SERPL-MCNC: 73.4 NG/ML (ref 30–100)
BASOPHILS # BLD AUTO: 0.1 X10E3/UL (ref 0–0.2)
BASOPHILS NFR BLD AUTO: 1 %
CHOLEST SERPL-MCNC: 144 MG/DL (ref 100–199)
EOSINOPHIL # BLD AUTO: 0.3 X10E3/UL (ref 0–0.4)
EOSINOPHIL NFR BLD AUTO: 3 %
ERYTHROCYTE [DISTWIDTH] IN BLOOD BY AUTOMATED COUNT: 12.3 % (ref 11.7–15.4)
HCT VFR BLD AUTO: 39.4 % (ref 34–46.6)
HDLC SERPL-MCNC: 53 MG/DL
HGB BLD-MCNC: 13.2 G/DL (ref 11.1–15.9)
IMM GRANULOCYTES # BLD AUTO: 0 X10E3/UL (ref 0–0.1)
IMM GRANULOCYTES NFR BLD AUTO: 0 %
LDLC SERPL CALC-MCNC: 67 MG/DL (ref 0–99)
LYMPHOCYTES # BLD AUTO: 2.7 X10E3/UL (ref 0.7–3.1)
LYMPHOCYTES NFR BLD AUTO: 29 %
MCH RBC QN AUTO: 29.5 PG (ref 26.6–33)
MCHC RBC AUTO-ENTMCNC: 33.5 G/DL (ref 31.5–35.7)
MCV RBC AUTO: 88 FL (ref 79–97)
MONOCYTES # BLD AUTO: 0.8 X10E3/UL (ref 0.1–0.9)
MONOCYTES NFR BLD AUTO: 8 %
NEUTROPHILS # BLD AUTO: 5.7 X10E3/UL (ref 1.4–7)
NEUTROPHILS NFR BLD AUTO: 59 %
PLATELET # BLD AUTO: 391 X10E3/UL (ref 150–450)
RBC # BLD AUTO: 4.47 X10E6/UL (ref 3.77–5.28)
T4 FREE SERPL-MCNC: 1.51 NG/DL (ref 0.82–1.77)
TRIGL SERPL-MCNC: 142 MG/DL (ref 0–149)
TSH SERPL DL<=0.005 MIU/L-ACNC: 0.16 UIU/ML (ref 0.45–4.5)
VLDLC SERPL CALC-MCNC: 24 MG/DL (ref 5–40)
WBC # BLD AUTO: 9.5 X10E3/UL (ref 3.4–10.8)

## 2022-12-23 NOTE — ASSESSMENT & PLAN NOTE
Discussed preventative health care.  Tdap and flu vaccines up-to-date.  Labs ordered.  Recommended annual eye and dental exams.

## 2022-12-23 NOTE — ASSESSMENT & PLAN NOTE
Follow-up with oncology, Dr. Bautista, as scheduled.  Mammogram is due in June 2023 and Dr. Bautista will order.

## 2022-12-23 NOTE — PROGRESS NOTES
Let her know:  1. CBC - blood count is normal.    2. TSH is low and Free T4 is normal.  I recommend she recheck her TSH in May 2023 when she does the thyroid ultrasound.      4. Lipid panel is normal    5. Vitamin D level is normal.

## 2022-12-23 NOTE — ASSESSMENT & PLAN NOTE
Continue losartan 25 mg daily. Discussed goal systolic blood pressure is less than 120 and goal diastolic is less than 80.  Discussed non-pharmacologic treatment options to lower blood pressure: low-sodium diet, weight loss, exercise 10 to 15 minutes/day and consumption of alcohol in moderation.

## 2022-12-23 NOTE — ASSESSMENT & PLAN NOTE
Patient's (Body mass index is 30.72 kg/m².) indicates that they are obese (BMI >30) with health conditions that include hypertension . Weight is unchanged. BMI  is above average; BMI management plan is completed. We discussed portion control and increasing exercise.

## 2023-01-09 RX ORDER — LOSARTAN POTASSIUM 25 MG/1
25 TABLET ORAL DAILY
Qty: 90 TABLET | Refills: 1 | Status: SHIPPED | OUTPATIENT
Start: 2023-01-09

## 2023-01-09 NOTE — TELEPHONE ENCOUNTER
Caller: Kailash Lisseth L    Relationship: Self    Best call back number: [phone]    Requested Prescriptions:   Requested Prescriptions     Pending Prescriptions Disp Refills   • losartan (Cozaar) 25 MG tablet 90 tablet 3     Sig: Take 1 tablet by mouth Daily.        Pharmacy where request should be sent: [facility] - [address] [phone] [facility] [phone]      Additional details provided by patient: PATIENT STATES SHE HAS ABOUT 2 WEEKS LEFT ON THIS PRESCRIPTION BUT GOT AN E-MAIL FROM [employer] ABOUT THIS PRESCRIPTION.     Does the patient have less than a 3 day supply:  [] Yes  [x] No    Would you like a call back once the refill request has been completed: [] Yes [] No    If the office needs to give you a call back, can they leave a voicemail: [] Yes [] No    Montrell Palmer Rep   01/09/23 08:53 EST

## 2023-02-22 ENCOUNTER — OFFICE VISIT (OUTPATIENT)
Dept: FAMILY MEDICINE CLINIC | Facility: CLINIC | Age: 58
End: 2023-02-22
Payer: COMMERCIAL

## 2023-02-22 VITALS
SYSTOLIC BLOOD PRESSURE: 114 MMHG | WEIGHT: 182.6 LBS | BODY MASS INDEX: 30.42 KG/M2 | TEMPERATURE: 97.8 F | HEART RATE: 91 BPM | DIASTOLIC BLOOD PRESSURE: 73 MMHG | RESPIRATION RATE: 18 BRPM | OXYGEN SATURATION: 96 % | HEIGHT: 65 IN

## 2023-02-22 DIAGNOSIS — B00.1 COLD SORE: ICD-10-CM

## 2023-02-22 DIAGNOSIS — L30.9 PERIORBITAL DERMATITIS: Primary | ICD-10-CM

## 2023-02-22 PROCEDURE — 99213 OFFICE O/P EST LOW 20 MIN: CPT | Performed by: NURSE PRACTITIONER

## 2023-02-22 RX ORDER — FAMCICLOVIR 500 MG/1
500 TABLET ORAL 3 TIMES DAILY
Qty: 21 TABLET | Refills: 0 | Status: SHIPPED | OUTPATIENT
Start: 2023-02-22 | End: 2023-03-01

## 2023-02-22 NOTE — PROGRESS NOTES
Chief Complaint  Rash (Around left side of mouth. Itchy, Swollen)    Subjective          Lisseth Linder presents to [facility] for rash around mouth and cold sores.    History of Present Illness    Patient started with a rash around her mouth a few days ago.  She has been using Chapstick on it.  She also has had a cold sore on her tongue as well as at the base of one of her teeth.  She has no other illness.  She googled it in his head to follow-up with primary care.     Lisseth Linder  has a past medical history of Adenomatous colon polyp, Allergic rhinitis, Asthma, Breast cancer (HCC), Cholelithiasis, Constipation, Duodenal ulcer, Erosive gastritis, Hiatal hernia, History of breast cancer, Hypothyroidism, Iron deficiency anemia, Microscopic hematuria, Primary hypertension (12/22/2021), SELMA (stress urinary incontinence, female), and Thyroid nodule.      Review of Systems   Constitutional: Negative for fatigue and fever.   Skin: Positive for rash.        Objective       Current Outpatient Medications:   •  albuterol sulfate  (90 Base) MCG/ACT inhaler, Inhale 2 puffs., Disp: , Rfl:   •  anastrozole (ARIMIDEX) 1 MG tablet, TAKE 1 TABLET DAILY, Disp: 90 tablet, Rfl: 3  •  Calcium Carbonate-Vitamin D 600-200 MG-UNIT tablet, 2 (Two) Times a Day., Disp: , Rfl:   •  cetirizine (zyrTEC) 10 MG tablet, Take 1 tablet by mouth Daily. At bedtime for allergies, Disp: 90 tablet, Rfl: 3  •  fluticasone (FLONASE) 50 MCG/ACT nasal spray, 2 sprays in each nostril once daily for allergies, Disp: 15.8 mL, Rfl: 11  •  losartan (Cozaar) 25 MG tablet, Take 1 tablet by mouth Daily., Disp: 90 tablet, Rfl: 1  •  Multiple Vitamin (MULTI-VITAMIN) tablet, MULTI-VITAMIN TABS, Disp: , Rfl:   •  pantoprazole (PROTONIX) 40 MG EC tablet, TAKE 1 TABLET DAILY, Disp: 90 tablet, Rfl: 3  •  azelastine (ASTELIN) 0.1 % nasal spray, 2 sprays 2 (Two) Times a Day. as directed, Disp: , Rfl:   •  famciclovir (FAMVIR)  "500 MG tablet, Take 1 tablet by mouth 3 (Three) Times a Day for 7 days., Disp: 21 tablet, Rfl: 0    Vital Signs:      /73 (BP Location: Right arm, Patient Position: Sitting, Cuff Size: Adult)   Pulse 91   Temp 97.8 °F (36.6 °C) (Infrared)   Resp 18   Ht 165.1 cm (65\")   Wt 82.8 kg (182 lb 9.6 oz)   SpO2 96%   BMI 30.39 kg/m²     Vitals:    02/22/23 1132   BP: 114/73   BP Location: Right arm   Patient Position: Sitting   Cuff Size: Adult   Pulse: 91   Resp: 18   Temp: 97.8 °F (36.6 °C)   TempSrc: Infrared   SpO2: 96%   Weight: 82.8 kg (182 lb 9.6 oz)   Height: 165.1 cm (65\")      Physical Exam  Vitals and nursing note reviewed.   Constitutional:       Appearance: Normal appearance. She is obese.   HENT:      Head: Normocephalic.      Nose: Nose normal.      Mouth/Throat:      Mouth: Mucous membranes are moist.      Comments: Small ulcer-like lesion at base of right lower canine.  No other sign of infection  Eyes:      Conjunctiva/sclera: Conjunctivae normal.   Musculoskeletal:      Cervical back: Normal range of motion and neck supple.   Skin:     General: Skin is cool.      Comments: Pink rash left periorbital area with no sign of lesion or infection.   Neurological:      Mental Status: She is alert.        Result Review :                  PHQ-9 Total Score: 0           Assessment and Plan    Diagnoses and all orders for this visit:    1. Periorbital dermatitis (Primary)  Assessment & Plan:  Discussed moisturizers.  Patient was suggested to have CeraVe.  Does not appear to be bad enough for steroid.      2. Cold sore  Comments:  Use the covers of the Famvir.  Patient has had a cold sore recently on her tongue as well.  If any dental issues see dentist    Other orders  -     famciclovir (FAMVIR) 500 MG tablet; Take 1 tablet by mouth 3 (Three) Times a Day for 7 days.  Dispense: 21 tablet; Refill: 0       Problem List Items Addressed This Visit        Active Problems    Periorbital dermatitis - Primary    " Current Assessment & Plan     Discussed moisturizers.  Patient was suggested to have CeraVe.  Does not appear to be bad enough for steroid.        Other Visit Diagnoses     Cold sore        Use the covers of the Famvir.  Patient has had a cold sore recently on her tongue as well.  If any dental issues see dentist    Relevant Medications    famciclovir (FAMVIR) 500 MG tablet          Follow Up   Return if symptoms worsen or fail to improve.  Patient was given instructions and counseling regarding her condition or for health maintenance advice. Please see specific information pulled into the AVS if appropriate.

## 2023-02-22 NOTE — ASSESSMENT & PLAN NOTE
Discussed moisturizers.  Patient was suggested to have CeraVe.  Does not appear to be bad enough for steroid.

## 2023-03-15 DIAGNOSIS — Z17.0 MALIGNANT NEOPLASM OF BREAST IN FEMALE, ESTROGEN RECEPTOR POSITIVE, UNSPECIFIED LATERALITY, UNSPECIFIED SITE OF BREAST: ICD-10-CM

## 2023-03-15 DIAGNOSIS — C50.919 MALIGNANT NEOPLASM OF BREAST IN FEMALE, ESTROGEN RECEPTOR POSITIVE, UNSPECIFIED LATERALITY, UNSPECIFIED SITE OF BREAST: ICD-10-CM

## 2023-03-15 RX ORDER — ANASTROZOLE 1 MG/1
TABLET ORAL
Qty: 90 TABLET | Refills: 3 | Status: SHIPPED | OUTPATIENT
Start: 2023-03-15

## 2023-04-21 NOTE — PROGRESS NOTES
Hematology/Oncology Outpatient Follow Up    PATIENT NAME:Lisseth Linder  :1965  MRN: [id number]  PRIMARY CARE PHYSICIAN: Susanne Avelar APRN  REFERRING PHYSICIAN: Susanne Avelar APRN    Chief Complaint   Patient presents with   • Follow-up     Malignant neoplasm of breast in female, estrogen receptor positive, unspecified laterality, unspecified site of breast        HISTORY OF PRESENT ILLNESS:     This is a 58-year-old female who was originally seen on 10/6/14.  • Patient’s original screening mammogram, not available.   • 14, she underwent needle localized excisional biopsy of the left breast mass and sentinel lymph node biopsy.  Tumor measured 1.1 cm, negative surgical margins.  ER positive, UT positive, HER-2/jamir negative.  • 14 - She had Oncotype DX assay performed on the tumor, which returned with a recurrent score of 10, which corresponds to 7% risk of distant recurrence following five years of Tamoxifen over a course of 10 years.  This places patient at low-risk disease.  ER was positive, UT positive and HER-2/jamir was negative on the validation assay.    • 14 - Patient completed accelerated breast radiation under the care of Dr. Pickett.  • 14 - Patient had a comprehensive genetic analysis with [facility].  This returned with no significant mutation identified, but she was found to have variant of unknown significance in the APC and BARD-1 genes.      • 14 - Patient initiated on Tamoxifen 20 mg daily.   • 6/8/15 - Bilateral digital diagnostic mammogram which showed fatty breasts and postsurgical changes on the left breast, otherwise negative.  Follow up in one year was recommended.  She also had an ultrasound of the left breast which did not show any abnormalities.   • 2/10/16 - EGD and colonoscopy were done.  On the EGD she was found to have erosive gastritis, Manuel erosions and a duodenal ulcer.  Colonoscopy showed tubular adenoma.   • 16 - Follow up mammogram  was essentially normal with stable post-op changes in the left breast.  Follow up in one year was recommended.    • 12/28/16 - Anemia work up revealed reticulocyte count (N), ferritin (L) 4, folate > 24, haptoglobin (N) 145, , iron binding capacity (H) 555.  SPEP with immunofixation assay did not reveal any monoclonal protein.  B12 (N) 353.  Methylmalonic acid level (N) 244.    • 1/11/16 - Urinalysis done showed small blood.  Urine culture did not grow any significant organisms.     • 6/16/17 - Bilateral mammogram with tomosynthesis was a stable exam without any abnormalities detected.    • 8/23/17 - Chemistry panel:  BUN 13, creatinine 0.7, liver function tests (N).  Estradiol level < 20.  FHS 31.5, also in the postmenopausal range.    • December 2017 - Patient received Injectafer for iron deficiency anemia.  Received 750 mg Injectafer day 1 and day 8.  • December 2017 - Ferritin 10.    • 12/13/17 - Tamoxifen discontinued.  Arimidex 1 mg daily and Os-Brenden D were initiated.    • 1/24/18 - DEXA scan, normal.    • 6/19/18 - Patient had bilateral diagnostic mammogram with tomosynthesis.  No mammographic evidence of malignancy.  Follow up in one year was recommended.    • 8/8/18 - Ferritin level was 227.  BUN 13.  Creatinine 0.6.   • 6/14/2019 patient had bilateral diagnostic mammogram with tomosynthesis there is no mammographic evidence of malignancy follow-up in 1 year was recommended.  • 2/4/2020-patient had a DEXA scan which showed a normal bone density  • February 2020 patient had anemia work-up which showed persistent iron deficiency with a ferritin level of 4.  Patient was referred back to [facility] and is to receive  IV Injectafer but due to the pandemic she canceled her appointments  • 5/18/2020 patient had bilateral screening mammogram, there was no mammographic evidence of malignancy.  Follow-up in 1 year was recommended  • HPI has been reviewed      Past Medical History:   Diagnosis Date   • Adenomatous  colon polyp    • Allergic rhinitis    • Asthma    • Breast cancer    • Cholelithiasis    • Constipation    • Duodenal ulcer    • Erosive gastritis    • Hiatal hernia    • History of breast cancer    • Hypothyroidism    • Iron deficiency anemia    • Microscopic hematuria    • Primary hypertension 12/22/2021   • SELMA (stress urinary incontinence, female)    • Thyroid nodule        Past Surgical History:   Procedure Laterality Date   • AUGMENTATION MAMMAPLASTY  1989   • BREAST LUMPECTOMY Left 2014   • BREAST RECONSTRUCTION Left 2014   • COLONOSCOPY  01/06/2021    normal; repeat 5 years; Dr. Daniels   • ENDOSCOPY  01/06/2021    Large 8 cm hiatal hernia; Dr. Daniels   • SENTINEL LYMPH NODE BIOPSY           Current Outpatient Medications:   •  albuterol sulfate  (90 Base) MCG/ACT inhaler, Inhale 2 puffs., Disp: , Rfl:   •  anastrozole (ARIMIDEX) 1 MG tablet, TAKE 1 TABLET DAILY, Disp: 90 tablet, Rfl: 3  •  azelastine (ASTELIN) 0.1 % nasal spray, 2 sprays 2 (Two) Times a Day. as directed, Disp: , Rfl:   •  Calcium Carbonate-Vitamin D 600-200 MG-UNIT tablet, 2 (Two) Times a Day., Disp: , Rfl:   •  cetirizine (zyrTEC) 10 MG tablet, Take 1 tablet by mouth Daily. At bedtime for allergies, Disp: 90 tablet, Rfl: 3  •  fluticasone (FLONASE) 50 MCG/ACT nasal spray, 2 sprays in each nostril once daily for allergies, Disp: 15.8 mL, Rfl: 11  •  losartan (Cozaar) 25 MG tablet, Take 1 tablet by mouth Daily., Disp: 90 tablet, Rfl: 1  •  Multiple Vitamin (MULTI-VITAMIN) tablet, MULTI-VITAMIN TABS, Disp: , Rfl:   •  pantoprazole (PROTONIX) 40 MG EC tablet, TAKE 1 TABLET DAILY, Disp: 90 tablet, Rfl: 3    No Known Allergies    Family History   Problem Relation Age of Onset   • Kidney cancer Father    • Prostate cancer Father    • Cancer Father    • Breast cancer Maternal Aunt    • Prostate cancer Paternal Aunt    • Lung cancer Paternal Uncle    • Diabetes Mother    • Hypertension Mother    • Hyperlipidemia Mother    • Heart disease  "Other         Grandparents   • Breast cancer Cousin        Cancer-related family history includes Breast cancer in her cousin and maternal aunt; Cancer in her father; Kidney cancer in her father; Lung cancer in her paternal uncle; Prostate cancer in her father and paternal aunt.    Social History     Tobacco Use   • Smoking status: Never   • Smokeless tobacco: Never   Vaping Use   • Vaping Use: Never used   Substance Use Topics   • Alcohol use: No   • Drug use: Never       I have reviewed and confirmed the accuracy of the patient's history: Chief complaint, HPI, ROS and Subjective as entered by the MA/LPN/RN. Courtney Bautista MD 04/24/23     SUBJECTIVE:    Patient does not have any specific complaints today.  She performs breast exams and does not report any changes.    Patient denies any new issues.  No breast specific complaints today        REVIEW OF SYSTEMS:    Review of Systems   Constitutional: Negative for chills and fever.   HENT: Negative for ear pain, mouth sores, nosebleeds and sore throat.    Eyes: Negative for photophobia and visual disturbance.   Respiratory: Negative for wheezing and stridor.    Cardiovascular: Negative for chest pain and palpitations.   Gastrointestinal: Negative for abdominal pain, diarrhea, nausea and vomiting.   Endocrine: Negative for cold intolerance and heat intolerance.   Genitourinary: Negative for dysuria and hematuria.   Musculoskeletal: Negative for joint swelling and neck stiffness.   Skin: Negative for color change and rash.   Neurological: Negative for seizures and syncope.   Hematological: Negative for adenopathy.        No obvious bleeding   Psychiatric/Behavioral: Negative for agitation, confusion and hallucinations.       OBJECTIVE:    Vitals:    04/24/23 1353   BP: 135/79   Pulse: 79   Resp: 18   Temp: 97 °F (36.1 °C)   TempSrc: Infrared   Weight: 81.2 kg (179 lb)   Height: 165.1 cm (65\")   PainSc: 0-No pain       ECOG    (0) Fully active, able to carry on " all predisease performance without restriction    Physical Exam   Constitutional: She is oriented to person, place, and time. No distress.   HENT:   Head: Normocephalic and atraumatic.   Eyes: Conjunctivae are normal. Right eye exhibits no discharge. Left eye exhibits no discharge. No scleral icterus.   Neck: No thyromegaly present.   Cardiovascular: Normal rate, regular rhythm and normal heart sounds. Exam reveals no gallop and no friction rub.   Pulmonary/Chest: Effort normal. No stridor. No respiratory distress. She has no wheezes. Right breast exhibits tenderness. Right breast exhibits no inverted nipple, no mass, no nipple discharge and no skin change. Left breast exhibits no inverted nipple, no mass, no nipple discharge, no skin change and no tenderness. No breast bleeding. Breasts are symmetrical.   Bilateral breast exam was negative  Bilateral axillary exam was negative   Abdominal: Soft. Bowel sounds are normal. She exhibits no mass. There is no abdominal tenderness. There is no rebound and no guarding.   Musculoskeletal: Normal range of motion. No tenderness.   Lymphadenopathy:     She has no cervical adenopathy.   Neurological: She is alert and oriented to person, place, and time. She exhibits normal muscle tone.   Skin: Skin is warm. No rash noted. She is not diaphoretic. No erythema.   Psychiatric: Her behavior is normal.   Nursing note and vitals reviewed.    Physical exam as documented above      I have reexamined the patient and the results are consistent with the previously documented exam. Courtney Bautista MD     RECENT LABS    WBC   Date Value Ref Range Status   04/24/2023 8.41 3.40 - 10.80 10*3/mm3 Final   12/22/2022 9.5 3.4 - 10.8 x10E3/uL Final     RBC   Date Value Ref Range Status   04/24/2023 4.29 3.77 - 5.28 10*6/mm3 Final   12/22/2022 4.47 3.77 - 5.28 x10E6/uL Final     Hemoglobin   Date Value Ref Range Status   04/24/2023 12.9 12.0 - 15.9 g/dL Final     Hematocrit   Date Value Ref  Range Status   04/24/2023 39.2 34.0 - 46.6 % Final     MCV   Date Value Ref Range Status   04/24/2023 91.4 79.0 - 97.0 fL Final     MCH   Date Value Ref Range Status   04/24/2023 30.1 26.6 - 33.0 pg Final     MCHC   Date Value Ref Range Status   04/24/2023 32.9 31.5 - 35.7 g/dL Final     RDW   Date Value Ref Range Status   04/24/2023 12.8 12.3 - 15.4 % Final     RDW-SD   Date Value Ref Range Status   04/24/2023 41.9 37.0 - 54.0 fl Final     MPV   Date Value Ref Range Status   04/24/2023 9.6 6.0 - 12.0 fL Final     Platelets   Date Value Ref Range Status   04/24/2023 333 140 - 450 10*3/mm3 Final     Neutrophil %   Date Value Ref Range Status   04/24/2023 56.9 42.7 - 76.0 % Final     Lymphocyte %   Date Value Ref Range Status   04/24/2023 29.3 19.6 - 45.3 % Final     Monocyte %   Date Value Ref Range Status   04/24/2023 9.6 5.0 - 12.0 % Final     Eosinophil %   Date Value Ref Range Status   04/24/2023 3.8 0.3 - 6.2 % Final     Basophil %   Date Value Ref Range Status   04/24/2023 0.4 0.0 - 1.5 % Final     Neutrophils, Absolute   Date Value Ref Range Status   04/24/2023 4.79 1.70 - 7.00 10*3/mm3 Final     Lymphocytes, Absolute   Date Value Ref Range Status   04/24/2023 2.46 0.70 - 3.10 10*3/mm3 Final     Monocytes, Absolute   Date Value Ref Range Status   04/24/2023 0.81 0.10 - 0.90 10*3/mm3 Final     Eosinophils, Absolute   Date Value Ref Range Status   04/24/2023 0.32 0.00 - 0.40 10*3/mm3 Final     Basophils, Absolute   Date Value Ref Range Status   04/24/2023 0.03 0.00 - 0.20 10*3/mm3 Final       Lab Results   Component Value Date    GLUCOSE 90 10/27/2022    BUN 16 10/27/2022    CREATININE 0.68 10/27/2022    EGFRIFNONA 82 01/27/2022    EGFRIFAFRI 95 01/27/2022    BCR 23.5 10/27/2022    K 4.1 10/27/2022    CO2 27.0 10/27/2022    CALCIUM 10.5 10/27/2022    PROTENTOTREF 7.6 02/28/2022    ALBUMIN 4.70 10/27/2022    LABIL2 1.8 01/27/2022    AST 16 10/27/2022    ALT 16 10/27/2022         ASSESSMENT:    1. Invasive ductal  carcinoma involving the left breast, status post left excisional biopsy with sentinel lymph node biopsy, ER positive, MA positive, HER-2/jamir negative for T1c N0 M0.  Ongoing surveillance.  2. She has completed accelerated partial breast radiation under the care of Dr. Pickett.  3. Low recurrence score on Oncotype DX assay.     4. Family history of three relatives with breast cancer including patient, worrisome for hereditary breast cancer syndrome.   5. Negative for deleterious mutation, but patient was found to have variant of unknown significance in the APC and BARD-1 genes.  6. Status post EGD which showed erosive gastritis, Manuel erosions and duodenal ulcer in February 2016.  Status post colonoscopy which showed tubular adenoma in February 2016.  Scheduled for repeat EGD colonoscopy January 2021 by Dr. Daniels.  Hemoglobin is stable  7. Long history of hematuria.  Follows  up with urology  8. Status post Tamoxifen 12/22/14 to 12/13/17.  Arimidex initiated 12/13/17.  Monitoring for side effects.  Patient will continue Arimidex till December 2025.  9. Recurrent anemia due to iron deficiency: This has resolved  10. Assessment has been reviewed and updated      PLANS:    CBC reviewed    CMP today  Bone density Feb 2022 was normal. Next bone density will be due Feb 2024    She will continue with Arimidex and Os-Brenden D.  Reviewed    Her bilateral diagnostic mammogram will be due June 2023:     Continue monthly breast self-exam and call for lumps, nipple discharge, skin discoloration or breast pain.  She will continue Arimidex and Os-Brenden D.  Reviewed               I have reviewed labs results, imaging, vitals, and medications with the patient today. Will follow up in 6 months with me.         Patient verbalized understanding and is in agreement of the above plan.         I spent 30 total minutes, face-to-face, caring for Lisseth today. 90% of this time involved counseling and/or coordination of care as documented  within this note.

## 2023-04-24 ENCOUNTER — OFFICE VISIT (OUTPATIENT)
Dept: ONCOLOGY | Facility: CLINIC | Age: 58
End: 2023-04-24
Payer: COMMERCIAL

## 2023-04-24 ENCOUNTER — LAB (OUTPATIENT)
Dept: LAB | Facility: HOSPITAL | Age: 58
End: 2023-04-24
Payer: COMMERCIAL

## 2023-04-24 VITALS
HEART RATE: 79 BPM | BODY MASS INDEX: 29.82 KG/M2 | HEIGHT: 65 IN | DIASTOLIC BLOOD PRESSURE: 79 MMHG | RESPIRATION RATE: 18 BRPM | TEMPERATURE: 97 F | SYSTOLIC BLOOD PRESSURE: 135 MMHG | WEIGHT: 179 LBS

## 2023-04-24 DIAGNOSIS — C50.919 MALIGNANT NEOPLASM OF BREAST IN FEMALE, ESTROGEN RECEPTOR POSITIVE, UNSPECIFIED LATERALITY, UNSPECIFIED SITE OF BREAST: Primary | ICD-10-CM

## 2023-04-24 DIAGNOSIS — Z17.0 MALIGNANT NEOPLASM OF BREAST IN FEMALE, ESTROGEN RECEPTOR POSITIVE, UNSPECIFIED LATERALITY, UNSPECIFIED SITE OF BREAST: Primary | ICD-10-CM

## 2023-04-24 LAB
ALBUMIN SERPL-MCNC: 4.2 G/DL (ref 3.5–5.2)
ALBUMIN/GLOB SERPL: 1.4 G/DL
ALP SERPL-CCNC: 102 U/L (ref 39–117)
ALT SERPL W P-5'-P-CCNC: 15 U/L (ref 1–33)
ANION GAP SERPL CALCULATED.3IONS-SCNC: 11 MMOL/L (ref 5–15)
AST SERPL-CCNC: 16 U/L (ref 1–32)
BASOPHILS # BLD AUTO: 0.03 10*3/MM3 (ref 0–0.2)
BASOPHILS NFR BLD AUTO: 0.4 % (ref 0–1.5)
BILIRUB SERPL-MCNC: 0.3 MG/DL (ref 0–1.2)
BUN SERPL-MCNC: 16 MG/DL (ref 6–20)
BUN/CREAT SERPL: 25.8 (ref 7–25)
CALCIUM SPEC-SCNC: 10 MG/DL (ref 8.6–10.5)
CHLORIDE SERPL-SCNC: 102 MMOL/L (ref 98–107)
CO2 SERPL-SCNC: 27 MMOL/L (ref 22–29)
CREAT SERPL-MCNC: 0.62 MG/DL (ref 0.57–1)
DEPRECATED RDW RBC AUTO: 41.9 FL (ref 37–54)
EGFRCR SERPLBLD CKD-EPI 2021: 102.7 ML/MIN/1.73
EOSINOPHIL # BLD AUTO: 0.32 10*3/MM3 (ref 0–0.4)
EOSINOPHIL NFR BLD AUTO: 3.8 % (ref 0.3–6.2)
ERYTHROCYTE [DISTWIDTH] IN BLOOD BY AUTOMATED COUNT: 12.8 % (ref 12.3–15.4)
GLOBULIN UR ELPH-MCNC: 2.9 GM/DL
GLUCOSE SERPL-MCNC: 99 MG/DL (ref 65–99)
HCT VFR BLD AUTO: 39.2 % (ref 34–46.6)
HGB BLD-MCNC: 12.9 G/DL (ref 12–15.9)
HOLD SPECIMEN: NORMAL
HOLD SPECIMEN: NORMAL
LYMPHOCYTES # BLD AUTO: 2.46 10*3/MM3 (ref 0.7–3.1)
LYMPHOCYTES NFR BLD AUTO: 29.3 % (ref 19.6–45.3)
MCH RBC QN AUTO: 30.1 PG (ref 26.6–33)
MCHC RBC AUTO-ENTMCNC: 32.9 G/DL (ref 31.5–35.7)
MCV RBC AUTO: 91.4 FL (ref 79–97)
MONOCYTES # BLD AUTO: 0.81 10*3/MM3 (ref 0.1–0.9)
MONOCYTES NFR BLD AUTO: 9.6 % (ref 5–12)
NEUTROPHILS NFR BLD AUTO: 4.79 10*3/MM3 (ref 1.7–7)
NEUTROPHILS NFR BLD AUTO: 56.9 % (ref 42.7–76)
PLATELET # BLD AUTO: 333 10*3/MM3 (ref 140–450)
PMV BLD AUTO: 9.6 FL (ref 6–12)
POTASSIUM SERPL-SCNC: 3.9 MMOL/L (ref 3.5–5.2)
PROT SERPL-MCNC: 7.1 G/DL (ref 6–8.5)
RBC # BLD AUTO: 4.29 10*6/MM3 (ref 3.77–5.28)
SODIUM SERPL-SCNC: 140 MMOL/L (ref 136–145)
WBC NRBC COR # BLD: 8.41 10*3/MM3 (ref 3.4–10.8)

## 2023-04-24 PROCEDURE — 36415 COLL VENOUS BLD VENIPUNCTURE: CPT

## 2023-04-24 PROCEDURE — 85025 COMPLETE CBC W/AUTO DIFF WBC: CPT

## 2023-04-24 PROCEDURE — 80053 COMPREHEN METABOLIC PANEL: CPT | Performed by: INTERNAL MEDICINE

## 2023-05-15 ENCOUNTER — HOSPITAL ENCOUNTER (OUTPATIENT)
Dept: ULTRASOUND IMAGING | Facility: HOSPITAL | Age: 58
Discharge: HOME | End: 2023-05-15
Admitting: NURSE PRACTITIONER
Payer: COMMERCIAL

## 2023-05-15 DIAGNOSIS — E04.2 MULTINODULAR THYROID: ICD-10-CM

## 2023-05-15 PROCEDURE — 76536 US EXAM OF HEAD AND NECK: CPT

## 2023-05-17 DIAGNOSIS — E04.2 MULTINODULAR THYROID: Primary | ICD-10-CM

## 2023-05-17 NOTE — PROGRESS NOTES
Let her know:  1. She has multiple stable TI-RADS category 4 nodules  2. Repeat thyroid ultrasound in 1 year per ACR guidelines.  3. She needs to have a follow up TSH level drawn.  I will put in the order and she can go to [facility] and have it done.

## 2023-05-20 DIAGNOSIS — E04.2 MULTINODULAR THYROID: Primary | ICD-10-CM

## 2023-06-06 ENCOUNTER — HOSPITAL ENCOUNTER (OUTPATIENT)
Dept: MAMMOGRAPHY | Facility: HOSPITAL | Age: 58
Discharge: HOME | End: 2023-06-06
Admitting: INTERNAL MEDICINE
Payer: COMMERCIAL

## 2023-06-06 DIAGNOSIS — Z17.0 MALIGNANT NEOPLASM OF BREAST IN FEMALE, ESTROGEN RECEPTOR POSITIVE, UNSPECIFIED LATERALITY, UNSPECIFIED SITE OF BREAST: ICD-10-CM

## 2023-06-06 DIAGNOSIS — C50.919 MALIGNANT NEOPLASM OF BREAST IN FEMALE, ESTROGEN RECEPTOR POSITIVE, UNSPECIFIED LATERALITY, UNSPECIFIED SITE OF BREAST: ICD-10-CM

## 2023-06-06 PROCEDURE — 77067 SCR MAMMO BI INCL CAD: CPT

## 2023-06-06 PROCEDURE — 77063 BREAST TOMOSYNTHESIS BI: CPT

## 2023-07-31 ENCOUNTER — OFFICE VISIT (OUTPATIENT)
Dept: ENDOCRINOLOGY | Facility: CLINIC | Age: 58
End: 2023-07-31
Payer: COMMERCIAL

## 2023-07-31 VITALS
BODY MASS INDEX: 30.05 KG/M2 | WEIGHT: 176 LBS | HEART RATE: 96 BPM | HEIGHT: 64 IN | OXYGEN SATURATION: 98 % | SYSTOLIC BLOOD PRESSURE: 118 MMHG | DIASTOLIC BLOOD PRESSURE: 89 MMHG

## 2023-07-31 DIAGNOSIS — E04.2 MULTIPLE THYROID NODULES: Primary | ICD-10-CM

## 2023-07-31 DIAGNOSIS — E05.90 SUBCLINICAL HYPERTHYROIDISM: ICD-10-CM

## 2023-07-31 DIAGNOSIS — I10 PRIMARY HYPERTENSION: ICD-10-CM

## 2023-10-02 ENCOUNTER — OFFICE VISIT (OUTPATIENT)
Dept: FAMILY MEDICINE CLINIC | Facility: CLINIC | Age: 58
End: 2023-10-02
Payer: COMMERCIAL

## 2023-10-02 VITALS
RESPIRATION RATE: 18 BRPM | WEIGHT: 180 LBS | OXYGEN SATURATION: 96 % | HEART RATE: 87 BPM | BODY MASS INDEX: 30.73 KG/M2 | HEIGHT: 64 IN | DIASTOLIC BLOOD PRESSURE: 64 MMHG | SYSTOLIC BLOOD PRESSURE: 90 MMHG

## 2023-10-02 DIAGNOSIS — J01.00 ACUTE NON-RECURRENT MAXILLARY SINUSITIS: Primary | ICD-10-CM

## 2023-10-02 DIAGNOSIS — H93.13 TINNITUS AURIUM, BILATERAL: ICD-10-CM

## 2023-10-02 DIAGNOSIS — J02.9 SORE THROAT: ICD-10-CM

## 2023-10-02 DIAGNOSIS — H65.93 FLUID LEVEL BEHIND TYMPANIC MEMBRANE OF BOTH EARS: ICD-10-CM

## 2023-10-02 DIAGNOSIS — J30.2 SEASONAL ALLERGIES: ICD-10-CM

## 2023-10-02 DIAGNOSIS — R05.8 PRODUCTIVE COUGH: ICD-10-CM

## 2023-10-02 DIAGNOSIS — I95.9 HYPOTENSION, UNSPECIFIED HYPOTENSION TYPE: ICD-10-CM

## 2023-10-02 LAB
EXPIRATION DATE: NORMAL
INTERNAL CONTROL: NORMAL
Lab: NORMAL
S PYO AG THROAT QL: NEGATIVE

## 2023-10-02 PROCEDURE — 87880 STREP A ASSAY W/OPTIC: CPT

## 2023-10-02 PROCEDURE — 99214 OFFICE O/P EST MOD 30 MIN: CPT

## 2023-10-02 RX ORDER — FLUCONAZOLE 100 MG/1
100 TABLET ORAL DAILY
Qty: 1 TABLET | Refills: 0 | Status: SHIPPED | OUTPATIENT
Start: 2023-10-02

## 2023-10-02 RX ORDER — BROMPHENIRAMINE MALEATE, PSEUDOEPHEDRINE HYDROCHLORIDE, AND DEXTROMETHORPHAN HYDROBROMIDE 2; 30; 10 MG/5ML; MG/5ML; MG/5ML
10 SYRUP ORAL 4 TIMES DAILY PRN
Qty: 300 ML | Refills: 0 | Status: SHIPPED | OUTPATIENT
Start: 2023-10-02 | End: 2023-10-02

## 2023-10-02 RX ORDER — AZITHROMYCIN 250 MG/1
TABLET, FILM COATED ORAL
Qty: 6 TABLET | Refills: 0 | Status: SHIPPED | OUTPATIENT
Start: 2023-10-02

## 2023-10-02 NOTE — PROGRESS NOTES
Office Note     Name: Lisseth Linder    : 1965     MRN: [id number]     Chief Complaint  Cough and Sore Throat    Subjective     Lisseth Linder presents to [facility] for an acute complaint of Cough and sore throat.      History of Present Illness  Patient is here today for a cough, sore throat and feeling like her ears are full. She is concerned the cough may turn into pneumonia, but her chest does not hurt when she coughs. The cough is worse at night. She has taken tylenol and mucinex and they help somewhat. She states nobody at home has been sick. She took a covid test on  at home and it was negative.           Cough  This is a new problem. The current episode started 1 to 4 weeks ago. The problem has been gradually worsening. The cough is Productive of sputum. Associated symptoms include chills, ear pain, nasal congestion, postnasal drip and a sore throat. Nothing aggravates the symptoms. She has tried rest and oral steroids for the symptoms.   Sore Throat   This is a new problem. The current episode started 1 to 4 weeks ago. Neither side of throat is experiencing more pain than the other. There has been no fever. The pain is mild. Associated symptoms include coughing, ear pain and trouble swallowing.     Current Outpatient Medications:     albuterol sulfate  (90 Base) MCG/ACT inhaler, Inhale 2 puffs., Disp: , Rfl:     anastrozole (ARIMIDEX) 1 MG tablet, TAKE 1 TABLET DAILY, Disp: 90 tablet, Rfl: 3    Calcium Carbonate-Vitamin D 600-200 MG-UNIT tablet, 2 (Two) Times a Day., Disp: , Rfl:     cetirizine (zyrTEC) 10 MG tablet, Take 1 tablet by mouth Daily. At bedtime for allergies, Disp: 90 tablet, Rfl: 3    fluticasone (FLONASE) 50 MCG/ACT nasal spray, 2 sprays in each nostril once daily for allergies, Disp: 15.8 mL, Rfl: 11    losartan (Cozaar) 25 MG tablet, Take 1 tablet by mouth Daily., Disp: 90 tablet, Rfl: 1    Multiple Vitamin (MULTI-VITAMIN) tablet,  "MULTI-VITAMIN TABS, Disp: , Rfl:     pantoprazole (PROTONIX) 40 MG EC tablet, TAKE 1 TABLET DAILY, Disp: 90 tablet, Rfl: 3    azithromycin (Zithromax Z-Hollis) 250 MG tablet, Take 2 tablets by mouth on day 1, then 1 tablet daily on days 2-5, Disp: 6 tablet, Rfl: 0    fluconazole (Diflucan) 100 MG tablet, Take 1 tablet by mouth Daily., Disp: 1 tablet, Rfl: 0    Not on File    Past Surgical History:   Procedure Laterality Date    AUGMENTATION MAMMAPLASTY  1989    BREAST LUMPECTOMY Left 2014    BREAST RECONSTRUCTION Left 2014    COLONOSCOPY  01/06/2021    normal; repeat 5 years; Dr. Daniels    ENDOSCOPY  01/06/2021    Large 8 cm hiatal hernia; Dr. Daniels    SENTINEL LYMPH NODE BIOPSY          Family History   Problem Relation Age of Onset    Diabetes Mother     Hypertension Mother     Hyperlipidemia Mother     High cholesterol Mother     Kidney cancer Father     Prostate cancer Father     Cancer Father     Hypertension Father     Breast cancer Maternal Aunt     Prostate cancer Paternal Aunt     Lung cancer Paternal Uncle     Breast cancer Cousin     Heart disease Other         Grandparents            2/22/2023    11:35 AM   PHQ-2/PHQ-9 Depression Screening   Little Interest or Pleasure in Doing Things 0-->not at all   Feeling Down, Depressed or Hopeless 0-->not at all   PHQ-9: Brief Depression Severity Measure Score 0       Review of Systems   Constitutional:  Positive for chills.   HENT:  Positive for ear pain, postnasal drip, sore throat and trouble swallowing.    Respiratory:  Positive for cough.      Objective     BP 90/64 (BP Location: Right arm, Patient Position: Sitting, Cuff Size: Adult)   Pulse 87   Resp 18   Ht 162.2 cm (63.86\")   Wt 81.6 kg (180 lb)   LMP 02/12/2015   SpO2 96%   BMI 31.03 kg/m²     BP Readings from Last 2 Encounters:   10/02/23 90/64   07/31/23 118/89       Wt Readings from Last 2 Encounters:   10/02/23 81.6 kg (180 lb)   07/31/23 79.8 kg (176 lb)       {BMI is >= 30 and <35. (Class 1 " Obesity). The following options were offered after discussion; (Optional):36883}         Physical Exam  Vitals and nursing note reviewed.   Constitutional:       General: She is not in acute distress.     Appearance: Normal appearance. She is well-groomed and overweight. She is ill-appearing. She is not toxic-appearing or diaphoretic.   HENT:      Head: Normocephalic and atraumatic.      Right Ear: Ear canal and external ear normal. A middle ear effusion is present.      Left Ear: Ear canal and external ear normal. A middle ear effusion is present.      Nose: Congestion and rhinorrhea present.      Right Sinus: Maxillary sinus tenderness present. No frontal sinus tenderness.      Left Sinus: Maxillary sinus tenderness present. No frontal sinus tenderness.      Mouth/Throat:      Lips: Pink.      Mouth: Mucous membranes are moist.      Pharynx: Uvula midline.   Eyes:      General: Vision grossly intact.      Pupils: Pupils are equal, round, and reactive to light.   Neck:      Vascular: No carotid bruit.   Cardiovascular:      Rate and Rhythm: Normal rate and regular rhythm.      Heart sounds: Normal heart sounds. No murmur heard.  Pulmonary:      Effort: Pulmonary effort is normal.      Breath sounds: Normal breath sounds and air entry.      Comments: Occasional loose sounding cough  Musculoskeletal:      Right lower leg: No edema.      Left lower leg: No edema.   Lymphadenopathy:      Cervical: No cervical adenopathy.   Skin:     General: Skin is warm and dry.   Neurological:      Mental Status: She is alert and oriented to person, place, and time. Mental status is at baseline.   Psychiatric:         Mood and Affect: Mood normal.         Behavior: Behavior normal. Behavior is cooperative.     Derm Physical Exam  Result Review :{ Labs  Result Review  Imaging  Med Tab  Media :23}     Assessment and Plan      {CC Problem List  Visit Diagnosis  ROS  Review (Popup)  Health Maintenance  Quality  BestPractice   Medications  [facility] Encounters  Media :23}   Diagnoses and all orders for this visit:    1. Acute non-recurrent maxillary sinusitis (Primary)  Comments:  Z-Hollis prescription given.  Patient request to use Mucinex at home.  Orders:  -     azithromycin (Zithromax Z-Hollis) 250 MG tablet; Take 2 tablets by mouth on day 1, then 1 tablet daily on days 2-5  Dispense: 6 tablet; Refill: 0    2. Hypotension, unspecified hypotension type  Comments:  Blood pressure 90/64 today.  Patient does not feel well.  Advised to check complete BP at home and follow-up with PCP    3. Tinnitus aurium, bilateral  Comments:  Encouraged daily cetirizine use to mitigate seasonal allergy symptoms.    4. Fluid level behind tympanic membrane of both ears  Comments:  Antibiotic prescribed.  Decongestant prescribed.  Recommend daily allergy medicine after acute illness.  Orders:  -     azithromycin (Zithromax Z-Hollis) 250 MG tablet; Take 2 tablets by mouth on day 1, then 1 tablet daily on days 2-5  Dispense: 6 tablet; Refill: 0    5. Seasonal allergies  Comments:  Encouraged daily cetirizine use.    6. Sore throat  -     POCT rapid strep A    7. Productive cough  Comments:  Previous green phlegm x 2 days not productive now x 2 days.   Reports worsening cough. worse at night.   Relieved wht Mucinex  Orders:  -     azithromycin (Zithromax Z-Hollis) 250 MG tablet; Take 2 tablets by mouth on day 1, then 1 tablet daily on days 2-5  Dispense: 6 tablet; Refill: 0    Other orders  -     fluconazole (Diflucan) 100 MG tablet; Take 1 tablet by mouth Daily.  Dispense: 1 tablet; Refill: 0  -     Discontinue: brompheniramine-pseudoephedrine-DM 30-2-10 MG/5ML syrup; Take 10 mL by mouth 4 (Four) Times a Day As Needed for Allergies.  Dispense: 300 mL; Refill: 0       Procedures    Problem List Items Addressed This Visit          Active Problems    Seasonal allergies    Hypotension    Tinnitus aurium, bilateral     Other Visit Diagnoses       Acute  non-recurrent maxillary sinusitis    -  Primary    Z-Hollis prescription given.  Patient request to use Mucinex at home.    Relevant Medications    azithromycin (Zithromax Z-Hollis) 250 MG tablet    Fluid level behind tympanic membrane of both ears        Antibiotic prescribed.  Decongestant prescribed.  Recommend daily allergy medicine after acute illness.    Relevant Medications    azithromycin (Zithromax Z-Hollis) 250 MG tablet    Sore throat        Relevant Orders    POCT rapid strep A (Completed)    Productive cough        Previous green phlegm x 2 days not productive now x 2 days.   Reports worsening cough. worse at night.   Relieved wht Mucinex    Relevant Medications    azithromycin (Zithromax Z-Hollis) 250 MG tablet           {Time Spent (Optional):28252}  {Instructions Charge Capture  Follow-up Communications :23}    Wrapup Tab  No follow-ups on file.     Patient Instructions   Continue current plan of care as discussed.   Take medication as ordered (if applicable).    Change toothbrush after 24 hour antibiotic use.  Gargle with warm salt water for symptomatic relief of sore throat.   Stay hydrated.     Practice good sleep hygiene.  Eat a well balanced diet with fresh fruit and vegetables.    Drink at least 8 bottles of water or equivalent per day.     Limit sweetened beverages, sodas, juices.    Bake, boil, broil or grill your food, avoid eating fried foods.   Exercise at least 150 minutes per week.       Stop b/p medication and check with Susanne at next appt.          Patient was given instructions and counseling regarding her condition or for health maintenance advice. Please see specific information pulled into the AVS if appropriate.  Hand hygiene was performed during entrance to exam room and following assessment of patient. This document is intended for medical expert use only.     EMR Dragon/Transcription disclaimer:   Much of this encounter note is an electronic transcription/translation of spoken language  to printed text. The electronic translation of spoken language may permit erroneous, or at times, nonsensical words or phrases to be inadvertently transcribed.      LELA Nice, FNP-C  [address]  [facility]  [address] [place] [address] [address]  [place] [address] [phone]

## 2023-10-02 NOTE — PROGRESS NOTES
Office Note     Name: Lisseth Linder    : 1965     MRN: [id number]     Chief Complaint  Cough and Sore Throat    Subjective     Lisseth Linder presents to [facility] for an acute complaint of Cough and sore throat.    History of Present Illness  Patient is here today for a cough, sore throat and feeling like her ears are full. She is concerned the cough may turn into pneumonia, but her chest does not hurt when she coughs. The cough is worse at night. She has taken tylenol and mucinex and they help somewhat. She states nobody at home has been sick. She took a covid test on  at home and it was negative.     Cough  This is a new problem. The current episode started 1 to 4 weeks ago. The problem has been gradually worsening. The cough is Productive of sputum. Associated symptoms include chills, ear pain, nasal congestion, postnasal drip and a sore throat. Nothing aggravates the symptoms. She has tried rest and oral steroids for the symptoms.   Sore Throat   This is a new problem. The current episode started 1 to 4 weeks ago. Neither side of throat is experiencing more pain than the other. There has been no fever. The pain is mild. Associated symptoms include coughing, ear pain and trouble swallowing.   History of Present Illness    Sinusitis, bilateral maxillary, enlarged tonsils, sore throat.       Current Outpatient Medications:     albuterol sulfate  (90 Base) MCG/ACT inhaler, Inhale 2 puffs., Disp: , Rfl:     anastrozole (ARIMIDEX) 1 MG tablet, TAKE 1 TABLET DAILY, Disp: 90 tablet, Rfl: 3    Calcium Carbonate-Vitamin D 600-200 MG-UNIT tablet, 2 (Two) Times a Day., Disp: , Rfl:     cetirizine (zyrTEC) 10 MG tablet, Take 1 tablet by mouth Daily. At bedtime for allergies, Disp: 90 tablet, Rfl: 3    fluticasone (FLONASE) 50 MCG/ACT nasal spray, 2 sprays in each nostril once daily for allergies, Disp: 15.8 mL, Rfl: 11    losartan (Cozaar) 25 MG tablet, Take 1  tablet by mouth Daily., Disp: 90 tablet, Rfl: 1    Multiple Vitamin (MULTI-VITAMIN) tablet, MULTI-VITAMIN TABS, Disp: , Rfl:     pantoprazole (PROTONIX) 40 MG EC tablet, TAKE 1 TABLET DAILY, Disp: 90 tablet, Rfl: 3    azithromycin (Zithromax Z-Hollis) 250 MG tablet, Take 2 tablets by mouth on day 1, then 1 tablet daily on days 2-5, Disp: 6 tablet, Rfl: 0    fluconazole (Diflucan) 100 MG tablet, Take 1 tablet by mouth Daily., Disp: 1 tablet, Rfl: 0    Not on File    Past Surgical History:   Procedure Laterality Date    AUGMENTATION MAMMAPLASTY  1989    BREAST LUMPECTOMY Left 2014    BREAST RECONSTRUCTION Left 2014    COLONOSCOPY  01/06/2021    normal; repeat 5 years; Dr. Daniels    ENDOSCOPY  01/06/2021    Large 8 cm hiatal hernia; Dr. Daniels    SENTINEL LYMPH NODE BIOPSY          Family History   Problem Relation Age of Onset    Diabetes Mother     Hypertension Mother     Hyperlipidemia Mother     High cholesterol Mother     Kidney cancer Father     Prostate cancer Father     Cancer Father     Hypertension Father     Breast cancer Maternal Aunt     Prostate cancer Paternal Aunt     Lung cancer Paternal Uncle     Breast cancer Cousin     Heart disease Other         Grandparents            2/22/2023    11:35 AM   PHQ-2/PHQ-9 Depression Screening   Little Interest or Pleasure in Doing Things 0-->not at all   Feeling Down, Depressed or Hopeless 0-->not at all   PHQ-9: Brief Depression Severity Measure Score 0       Review of Systems   Constitutional:  Positive for chills. Negative for fatigue and fever.   HENT:  Positive for ear pain, postnasal drip, sore throat and trouble swallowing.    Respiratory:  Positive for cough. Negative for shortness of breath and wheezing.    Cardiovascular: Negative.    Gastrointestinal: Negative.    Musculoskeletal: Negative.    Allergic/Immunologic: Positive for environmental allergies. Negative for food allergies.   Neurological:  Positive for headache. Negative for dizziness, weakness,  "light-headedness, memory problem and confusion.   Objective     BP 90/64 (BP Location: Right arm, Patient Position: Sitting, Cuff Size: Adult)   Pulse 87   Resp 18   Ht 162.2 cm (63.86\")   Wt 81.6 kg (180 lb)   LMP 02/12/2015   SpO2 96%   BMI 31.03 kg/m²     BP Readings from Last 2 Encounters:   10/02/23 90/64   07/31/23 118/89       Wt Readings from Last 2 Encounters:   10/02/23 81.6 kg (180 lb)   07/31/23 79.8 kg (176 lb)                Physical Exam  Vitals and nursing note reviewed.   Constitutional:       General: She is not in acute distress.     Appearance: Normal appearance. She is well-groomed and overweight. She is ill-appearing. She is not toxic-appearing or diaphoretic.   HENT:      Head: Normocephalic and atraumatic.      Right Ear: Ear canal and external ear normal. A middle ear effusion is present.      Left Ear: Ear canal and external ear normal. A middle ear effusion is present.      Ears:      Comments: Middle ear effusion     Nose: Congestion and rhinorrhea present.      Right Sinus: Maxillary sinus tenderness present. No frontal sinus tenderness.      Left Sinus: Maxillary sinus tenderness present. No frontal sinus tenderness.      Comments: Maxillary sinusitis bilaterally.     Mouth/Throat:      Lips: Pink.      Mouth: Mucous membranes are moist.      Pharynx: Uvula midline.      Comments: Enlarged tonsils and adenoids.  Throat pain.  Eyes:      General: Vision grossly intact.      Pupils: Pupils are equal, round, and reactive to light.   Neck:      Vascular: No carotid bruit.   Cardiovascular:      Rate and Rhythm: Normal rate and regular rhythm.      Heart sounds: Normal heart sounds. No murmur heard.  Pulmonary:      Effort: Pulmonary effort is normal.      Breath sounds: Normal breath sounds and air entry.      Comments: Occasional loose sounding cough  Musculoskeletal:      Right lower leg: No edema.      Left lower leg: No edema.   Lymphadenopathy:      Cervical: No cervical " adenopathy.   Skin:     General: Skin is warm and dry.   Neurological:      Mental Status: She is alert and oriented to person, place, and time. Mental status is at baseline.   Psychiatric:         Mood and Affect: Mood normal.         Behavior: Behavior normal. Behavior is cooperative.     Physical Exam   EENT     Ear comments: Middle ear effusion  Nose comments: Maxillary sinusitis bilaterally.  Result Review :   Strep swab performed in office was negative.  Assessment and Plan         Problems Addressed this Visit          Active Problems    Seasonal allergies    Hypotension    Tinnitus aurium, bilateral     Other Visit Diagnoses       Acute non-recurrent maxillary sinusitis    -  Primary    Z-Hollis prescription given.  Patient request to use Mucinex at home.    Relevant Medications    azithromycin (Zithromax Z-Hollis) 250 MG tablet    Fluid level behind tympanic membrane of both ears        Antibiotic prescribed.  Decongestant prescribed.  Recommend daily allergy medicine after acute illness.    Relevant Medications    azithromycin (Zithromax Z-Hollis) 250 MG tablet    Sore throat        Relevant Orders    POCT rapid strep A (Completed)    Productive cough        Previous green phlegm x 2 days not productive now x 2 days.   Reports worsening cough. worse at night.   Relieved wht Mucinex    Relevant Medications    azithromycin (Zithromax Z-Hollis) 250 MG tablet          Diagnoses         Codes Comments    Acute non-recurrent maxillary sinusitis    -  Primary ICD-10-CM: J01.00  ICD-9-CM: 461.0 Z-Hollis prescription given.  Patient request to use Mucinex at home.    Hypotension, unspecified hypotension type     ICD-10-CM: I95.9  ICD-9-CM: 458.9 Blood pressure 90/64 today.  Patient does not feel well.  Advised to check complete BP at home and follow-up with PCP    Tinnitus aurium, bilateral     ICD-10-CM: H93.13  ICD-9-CM: 388.31 Encouraged daily cetirizine use to mitigate seasonal allergy symptoms.    Fluid level behind tympanic  membrane of both ears     ICD-10-CM: H65.93  ICD-9-CM: 381.4 Antibiotic prescribed.  Decongestant prescribed.  Recommend daily allergy medicine after acute illness.    Seasonal allergies     ICD-10-CM: J30.2  ICD-9-CM: 477.9 Encouraged daily cetirizine use.    Sore throat     ICD-10-CM: J02.9  ICD-9-CM: 462     Productive cough     ICD-10-CM: R05.8  ICD-9-CM: 786.2 Previous green phlegm x 2 days not productive now x 2 days.   Reports worsening cough. worse at night.   Relieved wht Mucinex           Procedures    Problem List Items Addressed This Visit          Active Problems    Seasonal allergies    Hypotension    Tinnitus aurium, bilateral     Other Visit Diagnoses       Acute non-recurrent maxillary sinusitis    -  Primary    Z-Hollis prescription given.  Patient request to use Mucinex at home.    Relevant Medications    azithromycin (Zithromax Z-Hollis) 250 MG tablet    Fluid level behind tympanic membrane of both ears        Antibiotic prescribed.  Decongestant prescribed.  Recommend daily allergy medicine after acute illness.    Relevant Medications    azithromycin (Zithromax Z-Hollis) 250 MG tablet    Sore throat        Relevant Orders    POCT rapid strep A (Completed)    Productive cough        Previous green phlegm x 2 days not productive now x 2 days.   Reports worsening cough. worse at night.   Relieved wht Mucinex    Relevant Medications    azithromycin (Zithromax Z-Hollis) 250 MG tablet                   Wrapup Tab  No follow-ups on file.     Patient Instructions   Continue current plan of care as discussed.   Take medication as ordered (if applicable).    Change toothbrush after 24 hour antibiotic use.  Gargle with warm salt water for symptomatic relief of sore throat.   Stay hydrated.     Practice good sleep hygiene.  Eat a well balanced diet with fresh fruit and vegetables.    Drink at least 8 bottles of water or equivalent per day.     Limit sweetened beverages, sodas, juices.    Bake, boil, broil or grill your  food, avoid eating fried foods.   Exercise at least 150 minutes per week.       Stop b/p medication and check with Susanne at next appt.          Patient was given instructions and counseling regarding her condition or for health maintenance advice. Please see specific information pulled into the AVS if appropriate.  Hand hygiene was performed during entrance to exam room and following assessment of patient. This document is intended for medical expert use only.     EMR Dragon/Transcription disclaimer:   Much of this encounter note is an electronic transcription/translation of spoken language to printed text. The electronic translation of spoken language may permit erroneous, or at times, nonsensical words or phrases to be inadvertently transcribed.      LELA Nice, FNP-C  [address]  [facility]  [address] [place] [address] [address] [place] [address] [phone]    Transcribed from ambient dictation for LELA Nice by Leona Galdamez.  10/02/23   17:27 EDT    Patient or patient representative verbalized consent to the visit recording.  I have personally performed the services described in this document as transcribed by the above individual, and it is both accurate and complete.

## 2023-10-02 NOTE — PATIENT INSTRUCTIONS
Continue current plan of care as discussed.   Take medication as ordered (if applicable).    Change toothbrush after 24 hour antibiotic use.  Gargle with warm salt water for symptomatic relief of sore throat.   Stay hydrated.     Practice good sleep hygiene.  Eat a well balanced diet with fresh fruit and vegetables.    Drink at least 8 bottles of water or equivalent per day.     Limit sweetened beverages, sodas, juices.    Bake, boil, broil or grill your food, avoid eating fried foods.   Exercise at least 150 minutes per week.       Stop b/p medication and check with Susanne at next appt.

## 2023-10-25 ENCOUNTER — LAB (OUTPATIENT)
Dept: LAB | Facility: HOSPITAL | Age: 58
End: 2023-10-25
Payer: COMMERCIAL

## 2023-10-25 ENCOUNTER — OFFICE VISIT (OUTPATIENT)
Dept: ONCOLOGY | Facility: CLINIC | Age: 58
End: 2023-10-25
Payer: COMMERCIAL

## 2023-10-25 VITALS
WEIGHT: 180 LBS | OXYGEN SATURATION: 99 % | RESPIRATION RATE: 18 BRPM | HEART RATE: 68 BPM | TEMPERATURE: 98 F | DIASTOLIC BLOOD PRESSURE: 76 MMHG | SYSTOLIC BLOOD PRESSURE: 120 MMHG | HEIGHT: 64 IN | BODY MASS INDEX: 30.73 KG/M2

## 2023-10-25 DIAGNOSIS — Z17.0 MALIGNANT NEOPLASM OF BREAST IN FEMALE, ESTROGEN RECEPTOR POSITIVE, UNSPECIFIED LATERALITY, UNSPECIFIED SITE OF BREAST: Primary | ICD-10-CM

## 2023-10-25 DIAGNOSIS — C50.919 MALIGNANT NEOPLASM OF BREAST IN FEMALE, ESTROGEN RECEPTOR POSITIVE, UNSPECIFIED LATERALITY, UNSPECIFIED SITE OF BREAST: Primary | ICD-10-CM

## 2023-10-25 DIAGNOSIS — Z78.0 POST-MENOPAUSAL: ICD-10-CM

## 2023-10-25 LAB
ALBUMIN SERPL-MCNC: 3.7 G/DL (ref 3.5–5.2)
ALBUMIN/GLOB SERPL: 1.1 G/DL
ALP SERPL-CCNC: 104 U/L (ref 39–117)
ALT SERPL W P-5'-P-CCNC: 13 U/L (ref 1–33)
ANION GAP SERPL CALCULATED.3IONS-SCNC: 9 MMOL/L (ref 5–15)
AST SERPL-CCNC: 16 U/L (ref 1–32)
BASOPHILS # BLD AUTO: 0.02 10*3/MM3 (ref 0–0.2)
BASOPHILS NFR BLD AUTO: 0.3 % (ref 0–1.5)
BILIRUB SERPL-MCNC: 0.4 MG/DL (ref 0–1.2)
BUN SERPL-MCNC: 15 MG/DL (ref 6–20)
BUN/CREAT SERPL: 18.8 (ref 7–25)
CALCIUM SPEC-SCNC: 9.6 MG/DL (ref 8.6–10.5)
CHLORIDE SERPL-SCNC: 106 MMOL/L (ref 98–107)
CO2 SERPL-SCNC: 27 MMOL/L (ref 22–29)
CREAT SERPL-MCNC: 0.8 MG/DL (ref 0.57–1)
DEPRECATED RDW RBC AUTO: 44.3 FL (ref 37–54)
EGFRCR SERPLBLD CKD-EPI 2021: 85 ML/MIN/1.73
EOSINOPHIL # BLD AUTO: 0.15 10*3/MM3 (ref 0–0.4)
EOSINOPHIL NFR BLD AUTO: 1.9 % (ref 0.3–6.2)
ERYTHROCYTE [DISTWIDTH] IN BLOOD BY AUTOMATED COUNT: 13.3 % (ref 12.3–15.4)
GLOBULIN UR ELPH-MCNC: 3.3 GM/DL
GLUCOSE SERPL-MCNC: 86 MG/DL (ref 65–99)
HCT VFR BLD AUTO: 38.7 % (ref 34–46.6)
HGB BLD-MCNC: 12.4 G/DL (ref 12–15.9)
HOLD SPECIMEN: NORMAL
HOLD SPECIMEN: NORMAL
LYMPHOCYTES # BLD AUTO: 2.11 10*3/MM3 (ref 0.7–3.1)
LYMPHOCYTES NFR BLD AUTO: 27.3 % (ref 19.6–45.3)
MCH RBC QN AUTO: 30.1 PG (ref 26.6–33)
MCHC RBC AUTO-ENTMCNC: 32 G/DL (ref 31.5–35.7)
MCV RBC AUTO: 93.9 FL (ref 79–97)
MONOCYTES # BLD AUTO: 0.77 10*3/MM3 (ref 0.1–0.9)
MONOCYTES NFR BLD AUTO: 10 % (ref 5–12)
NEUTROPHILS NFR BLD AUTO: 4.68 10*3/MM3 (ref 1.7–7)
NEUTROPHILS NFR BLD AUTO: 60.5 % (ref 42.7–76)
PLATELET # BLD AUTO: 302 10*3/MM3 (ref 140–450)
PMV BLD AUTO: 9.7 FL (ref 6–12)
POTASSIUM SERPL-SCNC: 4 MMOL/L (ref 3.5–5.2)
PROT SERPL-MCNC: 7 G/DL (ref 6–8.5)
RBC # BLD AUTO: 4.12 10*6/MM3 (ref 3.77–5.28)
SODIUM SERPL-SCNC: 142 MMOL/L (ref 136–145)
WBC NRBC COR # BLD: 7.73 10*3/MM3 (ref 3.4–10.8)

## 2023-10-25 PROCEDURE — 36415 COLL VENOUS BLD VENIPUNCTURE: CPT

## 2023-10-25 PROCEDURE — 85025 COMPLETE CBC W/AUTO DIFF WBC: CPT

## 2023-10-25 PROCEDURE — 80053 COMPREHEN METABOLIC PANEL: CPT | Performed by: INTERNAL MEDICINE

## 2023-10-25 NOTE — PROGRESS NOTES
Hematology/Oncology Outpatient Follow Up    PATIENT NAME:Lisseth Linder  :1965  MRN: [id number]  PRIMARY CARE PHYSICIAN: Susanne Avelar APRN  REFERRING PHYSICIAN: No ref. provider found    Chief Complaint   Patient presents with    Follow-up     Malignant neoplasm of breast in female, estrogen receptor positive, unspecified laterality, unspecified site of breast        HISTORY OF PRESENT ILLNESS:     This is a 58-year-old female who was originally seen on 10/6/14.  Patient’s original screening mammogram, not available.   14, she underwent needle localized excisional biopsy of the left breast mass and sentinel lymph node biopsy.  Tumor measured 1.1 cm, negative surgical margins.  ER positive, RI positive, HER-2/jamir negative.  14 - She had Oncotype DX assay performed on the tumor, which returned with a recurrent score of 10, which corresponds to 7% risk of distant recurrence following five years of Tamoxifen over a course of 10 years.  This places patient at low-risk disease.  ER was positive, RI positive and HER-2/jamir was negative on the validation assay.    14 - Patient completed accelerated breast radiation under the care of Dr. Pickett.  14 - Patient had a comprehensive genetic analysis with [facility].  This returned with no significant mutation identified, but she was found to have variant of unknown significance in the APC and BARD-1 genes.      14 - Patient initiated on Tamoxifen 20 mg daily.   6/8/15 - Bilateral digital diagnostic mammogram which showed fatty breasts and postsurgical changes on the left breast, otherwise negative.  Follow up in one year was recommended.  She also had an ultrasound of the left breast which did not show any abnormalities.   2/10/16 - EGD and colonoscopy were done.  On the EGD she was found to have erosive gastritis, Manuel erosions and a duodenal ulcer.  Colonoscopy showed tubular adenoma.   16 - Follow up mammogram was essentially  normal with stable post-op changes in the left breast.  Follow up in one year was recommended.    12/28/16 - Anemia work up revealed reticulocyte count (N), ferritin (L) 4, folate > 24, haptoglobin (N) 145, , iron binding capacity (H) 555.  SPEP with immunofixation assay did not reveal any monoclonal protein.  B12 (N) 353.  Methylmalonic acid level (N) 244.    1/11/16 - Urinalysis done showed small blood.  Urine culture did not grow any significant organisms.     6/16/17 - Bilateral mammogram with tomosynthesis was a stable exam without any abnormalities detected.    8/23/17 - Chemistry panel:  BUN 13, creatinine 0.7, liver function tests (N).  Estradiol level < 20.  FHS 31.5, also in the postmenopausal range.    December 2017 - Patient received Injectafer for iron deficiency anemia.  Received 750 mg Injectafer day 1 and day 8.  December 2017 - Ferritin 10.    12/13/17 - Tamoxifen discontinued.  Arimidex 1 mg daily and Os-Brenden D were initiated.    1/24/18 - DEXA scan, normal.    6/19/18 - Patient had bilateral diagnostic mammogram with tomosynthesis.  No mammographic evidence of malignancy.  Follow up in one year was recommended.    8/8/18 - Ferritin level was 227.  BUN 13.  Creatinine 0.6.   6/14/2019 patient had bilateral diagnostic mammogram with tomosynthesis there is no mammographic evidence of malignancy follow-up in 1 year was recommended.  2/4/2020-patient had a DEXA scan which showed a normal bone density  February 2020 patient had anemia work-up which showed persistent iron deficiency with a ferritin level of 4.  Patient was referred back to [facility] and is to receive  IV Injectafer but due to the pandemic she canceled her appointments  5/18/2020 patient had bilateral screening mammogram, there was no mammographic evidence of malignancy.  Follow-up in 1 year was recommended  HPI has been reviewed      Past Medical History:   Diagnosis Date    Adenomatous colon polyp     Allergic rhinitis     Asthma      Breast cancer     Cholelithiasis     Constipation     Duodenal ulcer     Erosive gastritis     Hiatal hernia     History of breast cancer     Hypothyroidism     Iron deficiency anemia     Microscopic hematuria     Primary hypertension 12/22/2021    SELMA (stress urinary incontinence, female)     Thyroid nodule        Past Surgical History:   Procedure Laterality Date    AUGMENTATION MAMMAPLASTY  1989    BREAST LUMPECTOMY Left 2014    BREAST RECONSTRUCTION Left 2014    COLONOSCOPY  01/06/2021    normal; repeat 5 years; Dr. Daniels    ENDOSCOPY  01/06/2021    Large 8 cm hiatal hernia; Dr. Daniels    SENTINEL LYMPH NODE BIOPSY           Current Outpatient Medications:     albuterol sulfate  (90 Base) MCG/ACT inhaler, Inhale 2 puffs., Disp: , Rfl:     anastrozole (ARIMIDEX) 1 MG tablet, TAKE 1 TABLET DAILY, Disp: 90 tablet, Rfl: 3    azithromycin (Zithromax Z-Hollis) 250 MG tablet, Take 2 tablets by mouth on day 1, then 1 tablet daily on days 2-5, Disp: 6 tablet, Rfl: 0    Calcium Carbonate-Vitamin D 600-200 MG-UNIT tablet, 2 (Two) Times a Day., Disp: , Rfl:     cetirizine (zyrTEC) 10 MG tablet, Take 1 tablet by mouth Daily. At bedtime for allergies, Disp: 90 tablet, Rfl: 3    fluconazole (Diflucan) 100 MG tablet, Take 1 tablet by mouth Daily., Disp: 1 tablet, Rfl: 0    fluticasone (FLONASE) 50 MCG/ACT nasal spray, 2 sprays in each nostril once daily for allergies, Disp: 15.8 mL, Rfl: 11    losartan (Cozaar) 25 MG tablet, Take 1 tablet by mouth Daily. (Patient not taking: Reported on 10/25/2023), Disp: 90 tablet, Rfl: 1    Multiple Vitamin (MULTI-VITAMIN) tablet, MULTI-VITAMIN TABS, Disp: , Rfl:     pantoprazole (PROTONIX) 40 MG EC tablet, TAKE 1 TABLET DAILY, Disp: 90 tablet, Rfl: 3    Not on File    Family History   Problem Relation Age of Onset    Diabetes Mother     Hypertension Mother     Hyperlipidemia Mother     High cholesterol Mother     Kidney cancer Father     Prostate cancer Father     Cancer Father      Hypertension Father     Breast cancer Maternal Aunt     Prostate cancer Paternal Aunt     Lung cancer Paternal Uncle     Breast cancer Cousin     Heart disease Other         Grandparents       Cancer-related family history includes Breast cancer in her cousin and maternal aunt; Cancer in her father; Kidney cancer in her father; Lung cancer in her paternal uncle; Prostate cancer in her father and paternal aunt.    Social History     Tobacco Use    Smoking status: Never    Smokeless tobacco: Never   Vaping Use    Vaping Use: Never used   Substance Use Topics    Alcohol use: No    Drug use: Never       I have reviewed and confirmed the accuracy of the patient's history: Chief complaint, HPI, ROS, and Subjective as entered by the MA/LPN/RN. Courtneyxi Bautista MD 10/25/23          SUBJECTIVE:    Patient does not have any specific complaints today.  She performs breast exams and does not report any changes.    Patient denies any specific complaints today        REVIEW OF SYSTEMS:    Review of Systems   Constitutional:  Negative for chills and fever.   HENT:  Negative for ear pain, mouth sores, nosebleeds and sore throat.    Eyes:  Negative for photophobia and visual disturbance.   Respiratory:  Negative for wheezing and stridor.    Cardiovascular:  Negative for chest pain and palpitations.   Gastrointestinal:  Negative for abdominal pain, diarrhea, nausea and vomiting.   Endocrine: Negative for cold intolerance and heat intolerance.   Genitourinary:  Negative for dysuria and hematuria.   Musculoskeletal:  Negative for joint swelling and neck stiffness.   Skin:  Negative for color change and rash.   Neurological:  Negative for seizures and syncope.   Hematological:  Negative for adenopathy.        No obvious bleeding   Psychiatric/Behavioral:  Negative for agitation, confusion and hallucinations.        OBJECTIVE:    Vitals:    10/25/23 1214   BP: 120/76   Pulse: 68   Resp: 18   Temp: 98 °F (36.7 °C)   TempSrc:  "Infrared   SpO2: 99%   Weight: 81.6 kg (180 lb)   Height: 162.6 cm (64\")   PainSc: 0-No pain         ECOG    (0) Fully active, able to carry on all predisease performance without restriction    Physical Exam   Constitutional: She is oriented to person, place, and time. No distress.   HENT:   Head: Normocephalic and atraumatic.   Eyes: Conjunctivae are normal. Right eye exhibits no discharge. Left eye exhibits no discharge. No scleral icterus.   Neck: No thyromegaly present.   Cardiovascular: Normal rate, regular rhythm and normal heart sounds. Exam reveals no gallop and no friction rub.   Pulmonary/Chest: Effort normal. No stridor. No respiratory distress. She has no wheezes. Right breast exhibits tenderness. Right breast exhibits no inverted nipple, no mass, no nipple discharge and no skin change. Left breast exhibits no inverted nipple, no mass, no nipple discharge, no skin change and no tenderness. No breast bleeding. Breasts are symmetrical.   Bilateral breast exam was negative  Bilateral axillary exam was negative   Abdominal: Soft. Bowel sounds are normal. She exhibits no mass. There is no abdominal tenderness. There is no rebound and no guarding.   Genitourinary: No breast bleeding.   Musculoskeletal: Normal range of motion. No tenderness.   Lymphadenopathy:     She has no cervical adenopathy.   Neurological: She is alert and oriented to person, place, and time. She exhibits normal muscle tone.   Skin: Skin is warm. No rash noted. She is not diaphoretic. No erythema.   Psychiatric: Her behavior is normal.   Nursing note and vitals reviewed.    Physical exam as documented above    I have reexamined the patient and the results are consistent with the previously documented exam. Courtney Bautista MD      RECENT LABS    WBC   Date Value Ref Range Status   10/25/2023 7.73 3.40 - 10.80 10*3/mm3 Final   12/22/2022 9.5 3.4 - 10.8 x10E3/uL Final     RBC   Date Value Ref Range Status   10/25/2023 4.12 3.77 - 5.28 " 10*6/mm3 Final   12/22/2022 4.47 3.77 - 5.28 x10E6/uL Final     Hemoglobin   Date Value Ref Range Status   10/25/2023 12.4 12.0 - 15.9 g/dL Final     Hematocrit   Date Value Ref Range Status   10/25/2023 38.7 34.0 - 46.6 % Final     MCV   Date Value Ref Range Status   10/25/2023 93.9 79.0 - 97.0 fL Final     MCH   Date Value Ref Range Status   10/25/2023 30.1 26.6 - 33.0 pg Final     MCHC   Date Value Ref Range Status   10/25/2023 32.0 31.5 - 35.7 g/dL Final     RDW   Date Value Ref Range Status   10/25/2023 13.3 12.3 - 15.4 % Final     RDW-SD   Date Value Ref Range Status   10/25/2023 44.3 37.0 - 54.0 fl Final     MPV   Date Value Ref Range Status   10/25/2023 9.7 6.0 - 12.0 fL Final     Platelets   Date Value Ref Range Status   10/25/2023 302 140 - 450 10*3/mm3 Final     Neutrophil %   Date Value Ref Range Status   10/25/2023 60.5 42.7 - 76.0 % Final     Lymphocyte %   Date Value Ref Range Status   10/25/2023 27.3 19.6 - 45.3 % Final     Monocyte %   Date Value Ref Range Status   10/25/2023 10.0 5.0 - 12.0 % Final     Eosinophil %   Date Value Ref Range Status   10/25/2023 1.9 0.3 - 6.2 % Final     Basophil %   Date Value Ref Range Status   10/25/2023 0.3 0.0 - 1.5 % Final     Neutrophils, Absolute   Date Value Ref Range Status   10/25/2023 4.68 1.70 - 7.00 10*3/mm3 Final     Lymphocytes, Absolute   Date Value Ref Range Status   10/25/2023 2.11 0.70 - 3.10 10*3/mm3 Final     Monocytes, Absolute   Date Value Ref Range Status   10/25/2023 0.77 0.10 - 0.90 10*3/mm3 Final     Eosinophils, Absolute   Date Value Ref Range Status   10/25/2023 0.15 0.00 - 0.40 10*3/mm3 Final     Basophils, Absolute   Date Value Ref Range Status   10/25/2023 0.02 0.00 - 0.20 10*3/mm3 Final       Lab Results   Component Value Date    GLUCOSE 99 04/24/2023    BUN 16 04/24/2023    CREATININE 0.62 04/24/2023    EGFRIFNONA 82 01/27/2022    EGFRIFAFRI 95 01/27/2022    BCR 25.8 (H) 04/24/2023    K 3.9 04/24/2023    CO2 27.0 04/24/2023    CALCIUM  10.0 04/24/2023    PROTENTOTREF 7.6 02/28/2022    ALBUMIN 4.2 04/24/2023    LABIL2 1.8 01/27/2022    AST 16 04/24/2023    ALT 15 04/24/2023         ASSESSMENT:    Invasive ductal carcinoma involving the left breast, status post left excisional biopsy with sentinel lymph node biopsy, ER positive, RI positive, HER-2/jamir negative for T1c N0 M0.  Ongoing surveillance  She has completed accelerated partial breast radiation under the care of Dr. Pickett.  Low recurrence score on Oncotype DX assay.     Family history of three relatives with breast cancer including patient, worrisome for hereditary breast cancer syndrome.   Negative for deleterious mutation, but patient was found to have variant of unknown significance in the APC and BARD-1 genes.  Status post EGD which showed erosive gastritis, Manuel erosions and duodenal ulcer in February 2016.  Status post colonoscopy which showed tubular adenoma in February 2016.  Scheduled for repeat EGD colonoscopy January 2021 by Dr. Daniels.  Hemoglobin is stable  Long history of hematuria.  Follows  up with urology  Status post Tamoxifen 12/22/14 to 12/13/17.  Arimidex initiated 12/13/17.  Monitoring for side effects.  Patient will continue Arimidex till December 2025.  Reviewed  Recurrent anemia due to iron deficiency: This has resolved  Assessment has been reviewed and updated      PLANS:    CBC reviewed    CMP ordered  Bone density Feb 2022 was normal. Next bone density will be due Feb 2024.  Bone density was ordered today    Continue Arimidex and Os-Brenden D.  Reviewed    Her bilateral diagnostic mammogram will be due June 2024    Continue monthly breast self-exam and call for lumps, nipple discharge, skin discoloration or breast pain.  She will continue Arimidex and Os-Brenden D.  Reviewed               I have reviewed labs results, imaging, vitals, and medications with the patient today. Will follow up in 6 months with me.         Patient verbalized understanding and is in  agreement of the above plan.

## 2023-10-31 DIAGNOSIS — J30.1 SEASONAL ALLERGIC RHINITIS DUE TO POLLEN: ICD-10-CM

## 2023-10-31 RX ORDER — CETIRIZINE HYDROCHLORIDE 10 MG/1
10 TABLET ORAL DAILY
Qty: 90 TABLET | Refills: 3 | Status: SHIPPED | OUTPATIENT
Start: 2023-10-31

## 2023-12-04 RX ORDER — PANTOPRAZOLE SODIUM 40 MG/1
TABLET, DELAYED RELEASE ORAL
Qty: 90 TABLET | Refills: 3 | Status: SHIPPED | OUTPATIENT
Start: 2023-12-04

## 2023-12-28 ENCOUNTER — OFFICE VISIT (OUTPATIENT)
Dept: FAMILY MEDICINE CLINIC | Facility: CLINIC | Age: 58
End: 2023-12-28
Payer: COMMERCIAL

## 2023-12-28 VITALS
TEMPERATURE: 97.9 F | WEIGHT: 179 LBS | OXYGEN SATURATION: 96 % | HEART RATE: 72 BPM | SYSTOLIC BLOOD PRESSURE: 125 MMHG | DIASTOLIC BLOOD PRESSURE: 81 MMHG | HEIGHT: 64 IN | BODY MASS INDEX: 30.56 KG/M2 | RESPIRATION RATE: 18 BRPM

## 2023-12-28 DIAGNOSIS — E04.2 MULTINODULAR THYROID: ICD-10-CM

## 2023-12-28 DIAGNOSIS — Z85.3 HISTORY OF BREAST CANCER: ICD-10-CM

## 2023-12-28 DIAGNOSIS — I10 PRIMARY HYPERTENSION: ICD-10-CM

## 2023-12-28 DIAGNOSIS — E66.09 CLASS 1 OBESITY DUE TO EXCESS CALORIES WITH SERIOUS COMORBIDITY AND BODY MASS INDEX (BMI) OF 30.0 TO 30.9 IN ADULT: ICD-10-CM

## 2023-12-28 DIAGNOSIS — Z00.00 WELLNESS EXAMINATION: Primary | ICD-10-CM

## 2023-12-28 DIAGNOSIS — J30.1 NON-SEASONAL ALLERGIC RHINITIS DUE TO POLLEN: ICD-10-CM

## 2023-12-28 PROBLEM — J30.2 SEASONAL ALLERGIES: Status: RESOLVED | Noted: 2023-10-02 | Resolved: 2023-12-28

## 2023-12-28 RX ORDER — FLUTICASONE PROPIONATE 50 MCG
SPRAY, SUSPENSION (ML) NASAL
Qty: 15.8 ML | Refills: 11 | Status: SHIPPED | OUTPATIENT
Start: 2023-12-28

## 2023-12-28 RX ORDER — CETIRIZINE HYDROCHLORIDE 10 MG/1
10 TABLET ORAL DAILY
Qty: 90 TABLET | Refills: 3 | Status: SHIPPED | OUTPATIENT
Start: 2023-12-28

## 2023-12-28 NOTE — PROGRESS NOTES
Chief Complaint  Annual Exam, Hypertension, and Thyroid Problem    Aramis Montanez is a 58 y.o. female  who presents to [facility]     Patient Care Team:  Susanne Avelar APRN as PCP - General (Family Medicine)  Courtney Bautista MD as Consulting Physician (Hematology and Oncology)  Zeke Daniels MD as Consulting Physician (Gastroenterology)     History of Present Illness  Adult Annual Wellness    Social History    Tobacco Use      Smoking status: Never      Smokeless tobacco: Never    Vaping Use      Vaping Use: Never used    Alcohol use: No    Drug use: Never     General health habits  Last eye exam - about 2 years ago  Last dental exam - Spring 2023    Diet - Regular diet    Weight / BMI - obese, BMI 30.7    Exercise - no    Hours of sleep per night ? 8-9    Safety -   Do you use seat belts consistently ? Yes   Working smoke detector in home ? Yes   Do you use sunscreen when outdoors? Yes     Reproductive health -  Sexually active - yes  Birth control - menopause  History of abnormal pap smear? No    Screening/prevention  Last mammogram/ breast cancer screening - 6/6/2023  Last pap smear/ cervical cancer screening - 12/22/21 NILM  Last DEXA scan - 2/7/2022, normal  Colon cancer screening - colonoscopy 2021    Immunizations -   Tdap - 11/1/2018  Pneumococcal vaccine - up to date  Shingles (Shingrix) - she has had 2 doses     ++++++++++++++++++++++++    Patient presents for follow-up of HTN  This is an established problem.  Interim treatment changes: stopped losartan in October 2023 due to hypotension  Current medications: none  Checks blood pressure at home ? Yes     Patient presents for follow-up of multinodular thyroid  This is an established problem.  Interim treatment changes: none  Current medications: none  Last thyroid US 5/15/2023.  Needs a follow up thyroid US in 1 year (May 2024)  Last TSH 5/19/2023 was low.  Saw endocrinology, Dr. Pierre,  on 7/31/2023     Hx breast cancer.  Takes anastrozole 1 mg daily  Sees oncology, Dr. Bautista  Last mammogram 6/6/2023      Lisseth Linder  has a past medical history of Adenomatous colon polyp, Allergic rhinitis, Asthma, Breast cancer, Cholelithiasis, Constipation, Duodenal ulcer, Erosive gastritis, Hiatal hernia, History of breast cancer, Hypothyroidism, Iron deficiency anemia, Microscopic hematuria, Primary hypertension (12/22/2021), SELMA (stress urinary incontinence, female), and Thyroid nodule.      Review of Systems   Constitutional:  Negative for fatigue and fever.   HENT:  Negative for ear pain, sore throat and trouble swallowing.    Eyes:  Negative for visual disturbance.   Respiratory:  Negative for cough, choking and shortness of breath.    Cardiovascular:  Negative for chest pain, palpitations and leg swelling.   Gastrointestinal:  Negative for abdominal pain, blood in stool, constipation, diarrhea, nausea and vomiting.        + GERD   Endocrine: Negative for cold intolerance, heat intolerance, polydipsia, polyphagia and polyuria.   Genitourinary:  Negative for dysuria, hematuria, vaginal bleeding and vaginal discharge.   Allergic/Immunologic: Positive for environmental allergies.   Neurological:  Negative for dizziness and headaches.   Hematological:  Negative for adenopathy.   Psychiatric/Behavioral:  Negative for dysphoric mood and sleep disturbance. The patient is not nervous/anxious.         Family History   Problem Relation Age of Onset    Diabetes Mother     Hypertension Mother     Hyperlipidemia Mother     High cholesterol Mother     Kidney cancer Father     Prostate cancer Father     Cancer Father     Hypertension Father     Breast cancer Maternal Aunt     Prostate cancer Paternal Aunt     Lung cancer Paternal Uncle     Breast cancer Cousin     Heart disease Other         Grandparents        Past Surgical History:   Procedure Laterality Date    AUGMENTATION MAMMAPLASTY  1989    BREAST LUMPECTOMY  Left 2014    BREAST RECONSTRUCTION Left 2014    COLONOSCOPY  2021    normal; repeat 5 years; Dr. Daniels    ENDOSCOPY  2021    Large 8 cm hiatal hernia; Dr. Daniels    SENTINEL LYMPH NODE BIOPSY          Social History     Socioeconomic History    Marital status:      Spouse name: Edu Wheeler    Number of children: 2    Highest education level: High school graduate   Tobacco Use    Smoking status: Never    Smokeless tobacco: Never   Vaping Use    Vaping Use: Never used   Substance and Sexual Activity    Alcohol use: No    Drug use: Never    Sexual activity: Yes     Partners: Male     Birth control/protection: Post-menopausal        Immunization History   Administered Date(s) Administered    COVID-19 (MODERNA) 1st,2nd,3rd Dose Monovalent 2021, 2021, 2021, 2022    COVID-19 (PFIZER) BIVALENT 12+YRS 10/10/2022    COVID-19 F23 (MODERNA) 12YRS+ (SPIKEVAX) 2023    Fluzone (or Fluarix & Flulaval for VFC) >6mos 10/22/2019    Fluzone Quad >6mos (Multi-dose) 2020, 2021    Hepatitis A 2018, 10/24/2018    Influenza Injectable Mdck Pf Quad 10/10/2022, 2023    Pneumococcal Conjugate 13-Valent (PCV13) 2016    Pneumococcal Polysaccharide (PPSV23) 2014    Shingrix 2019, 2020    Tdap 2010, 2018    Zostavax 2016     Menstrual History:  OB History          2    Para        Term                AB        Living             SAB        IAB        Ectopic        Molar        Multiple        Live Births                   Patient's last menstrual period was 2015.       Objective       Current Outpatient Medications:     albuterol sulfate  (90 Base) MCG/ACT inhaler, Inhale 2 puffs., Disp: , Rfl:     anastrozole (ARIMIDEX) 1 MG tablet, TAKE 1 TABLET DAILY, Disp: 90 tablet, Rfl: 3    Calcium Carbonate-Vitamin D 600-200 MG-UNIT tablet, 2 (Two) Times a Day., Disp: , Rfl:     cetirizine (zyrTEC) 10 MG  "tablet, Take 1 tablet by mouth Daily. At bedtime for allergies, Disp: 90 tablet, Rfl: 3    fluticasone (FLONASE) 50 MCG/ACT nasal spray, 2 sprays in each nostril once daily for allergies, Disp: 15.8 mL, Rfl: 11    Multiple Vitamin (MULTI-VITAMIN) tablet, MULTI-VITAMIN TABS, Disp: , Rfl:     pantoprazole (PROTONIX) 40 MG EC tablet, TAKE 1 TABLET DAILY, Disp: 90 tablet, Rfl: 3    Vital Signs:      /81   Pulse 72   Temp 97.9 °F (36.6 °C) (Temporal)   Resp 18   Ht 162.6 cm (64.02\")   Wt 81.2 kg (179 lb)   LMP 02/12/2015   SpO2 96%   BMI 30.71 kg/m²     Vitals:    12/28/23 0816   BP: 125/81   Pulse: 72   Resp: 18   Temp: 97.9 °F (36.6 °C)   TempSrc: Temporal   SpO2: 96%   Weight: 81.2 kg (179 lb)   Height: 162.6 cm (64.02\")   PainSc: 0-No pain      Physical Exam  Vitals reviewed.   Constitutional:       General: She is not in acute distress.     Appearance: Normal appearance.   HENT:      Head: Normocephalic and atraumatic.      Right Ear: Tympanic membrane, ear canal and external ear normal.      Left Ear: Tympanic membrane, ear canal and external ear normal.      Nose: Nose normal.      Mouth/Throat:      Mouth: Mucous membranes are moist.      Pharynx: Oropharynx is clear. No oropharyngeal exudate.   Eyes:      General: No scleral icterus.     Conjunctiva/sclera: Conjunctivae normal.   Neck:      Thyroid: No thyromegaly.   Cardiovascular:      Rate and Rhythm: Normal rate and regular rhythm.      Heart sounds: Normal heart sounds.   Pulmonary:      Effort: Pulmonary effort is normal. No respiratory distress.      Breath sounds: Normal breath sounds. No wheezing.   Abdominal:      General: Bowel sounds are normal. There is no distension.      Palpations: Abdomen is soft. There is no mass.      Tenderness: There is no abdominal tenderness. There is no guarding or rebound.   Musculoskeletal:      Cervical back: Neck supple.      Right lower leg: No edema.      Left lower leg: No edema.   Lymphadenopathy:    "   Cervical: No cervical adenopathy.   Skin:     General: Skin is warm and dry.   Neurological:      Mental Status: She is alert and oriented to person, place, and time.   Psychiatric:         Mood and Affect: Mood normal.         Behavior: Behavior normal.        Result Review :                PHQ-2 Depression Screening  Little interest or pleasure in doing things? 0-->not at all   Feeling down, depressed, or hopeless? 0-->not at all   PHQ-2 Total Score 0        Assessment and Plan    Diagnoses and all orders for this visit:    1. Wellness examination (Primary)  Assessment & Plan:  Discussed preventative healthcare.  Labs ordered. Recommended annual eye and dental exams. Recommended use of seatbelts, sunscreen and smoke detectors in the home. Tdap up to date. Cervical cancer screening (pap smear) up to date.  Breast cancer screening (mammogram) up to date.  Colon cancer screening up to date.      Orders:  -     CBC & Differential  -     Comprehensive Metabolic Panel  -     Lipid Panel  -     TSH Rfx On Abnormal To Free T4    2. Primary hypertension  Assessment & Plan:  Chronic stable problem.  She is off medication.    Continue to monitor blood pressure at home and bring a copy of home readings to next appointment.        3. Multinodular thyroid  Assessment & Plan:  Repeat thyroid ultrasound in May 2024  Check TSH    Orders:  -     TSH Rfx On Abnormal To Free T4  -     US Thyroid; Future    4. Non-seasonal allergic rhinitis due to pollen  Overview:  She has seen an allergist in the past and took allergy shots in the past.    Assessment & Plan:  Chronic stable problem.  Continue Zyrtec 10 mg daily and Flonase nasal spray 2 sprays each nostril once daily    Orders:  -     cetirizine (zyrTEC) 10 MG tablet; Take 1 tablet by mouth Daily. At bedtime for allergies  Dispense: 90 tablet; Refill: 3  -     fluticasone (FLONASE) 50 MCG/ACT nasal spray; 2 sprays in each nostril once daily for allergies  Dispense: 15.8 mL;  Refill: 11    5. History of breast cancer  Assessment & Plan:  Follow-up with oncology, Dr. Bautista, as scheduled.  Mammogram is due in June 2024.    Orders:  -     CBC & Differential    6. Class 1 obesity due to excess calories with serious comorbidity and body mass index (BMI) of 30.0 to 30.9 in adult  Assessment & Plan:  Patient's (Body mass index is 30.71 kg/m².) indicates that they are obese (BMI >30) with health conditions that include hypertension. Weight is unchanged. BMI is above average; BMI management plan is completed. We discussed portion control and increasing exercise.              Follow Up   Return in about 1 year (around 12/28/2024) for Annual physical.  Patient was given instructions and counseling regarding her condition or for health maintenance advice. Please see specific information pulled into the AVS if appropriate.    Patient Instructions   Go to [facility] (across the [address] from office- [address]) for bloodwork.     The [facility] Scheduling Department will contact you to schedule ordered testing.  If you would like to schedule or verify your appointment, you can call [facility] Scheduling at [phone].

## 2023-12-28 NOTE — PATIENT INSTRUCTIONS
Go to [facility] (across the [address] from office- [address]) for bloodwork.     The [facility] Scheduling Department will contact you to schedule ordered testing.  If you would like to schedule or verify your appointment, you can call [facility] Scheduling at [phone].

## 2023-12-30 NOTE — ASSESSMENT & PLAN NOTE
Discussed preventative healthcare.  Labs ordered. Recommended annual eye and dental exams. Recommended use of seatbelts, sunscreen and smoke detectors in the home. Tdap up to date. Cervical cancer screening (pap smear) up to date.  Breast cancer screening (mammogram) up to date.  Colon cancer screening up to date.

## 2023-12-30 NOTE — ASSESSMENT & PLAN NOTE
Chronic stable problem.  She is off medication.    Continue to monitor blood pressure at home and bring a copy of home readings to next appointment.

## 2023-12-30 NOTE — ASSESSMENT & PLAN NOTE
Patient's (Body mass index is 30.71 kg/m².) indicates that they are obese (BMI >30) with health conditions that include hypertension. Weight is unchanged. BMI is above average; BMI management plan is completed. We discussed portion control and increasing exercise.

## 2023-12-30 NOTE — ASSESSMENT & PLAN NOTE
Chronic stable problem.  Continue Zyrtec 10 mg daily and Flonase nasal spray 2 sprays each nostril once daily

## 2024-01-01 RX ORDER — LOSARTAN POTASSIUM 25 MG/1
25 TABLET ORAL DAILY
Qty: 90 TABLET | Refills: 3 | OUTPATIENT
Start: 2024-01-01

## 2024-01-03 LAB
ALBUMIN SERPL-MCNC: 4.4 G/DL (ref 3.8–4.9)
ALBUMIN/GLOB SERPL: 1.7 {RATIO} (ref 1.2–2.2)
ALP SERPL-CCNC: 112 IU/L (ref 44–121)
ALT SERPL-CCNC: 16 IU/L (ref 0–32)
AST SERPL-CCNC: 17 IU/L (ref 0–40)
BASOPHILS # BLD AUTO: 0 X10E3/UL (ref 0–0.2)
BASOPHILS NFR BLD AUTO: 1 %
BILIRUB SERPL-MCNC: 0.4 MG/DL (ref 0–1.2)
BUN SERPL-MCNC: 16 MG/DL (ref 6–24)
BUN/CREAT SERPL: 23 (ref 9–23)
CALCIUM SERPL-MCNC: 9.9 MG/DL (ref 8.7–10.2)
CHLORIDE SERPL-SCNC: 101 MMOL/L (ref 96–106)
CHOLEST SERPL-MCNC: 130 MG/DL (ref 100–199)
CO2 SERPL-SCNC: 24 MMOL/L (ref 20–29)
CREAT SERPL-MCNC: 0.7 MG/DL (ref 0.57–1)
EGFRCR SERPLBLD CKD-EPI 2021: 100 ML/MIN/1.73
EOSINOPHIL # BLD AUTO: 0.2 X10E3/UL (ref 0–0.4)
EOSINOPHIL NFR BLD AUTO: 2 %
ERYTHROCYTE [DISTWIDTH] IN BLOOD BY AUTOMATED COUNT: 12.4 % (ref 11.7–15.4)
GLOBULIN SER CALC-MCNC: 2.6 G/DL (ref 1.5–4.5)
GLUCOSE SERPL-MCNC: 105 MG/DL (ref 70–99)
HCT VFR BLD AUTO: 40.1 % (ref 34–46.6)
HDLC SERPL-MCNC: 63 MG/DL
HGB BLD-MCNC: 13.3 G/DL (ref 11.1–15.9)
IMM GRANULOCYTES # BLD AUTO: 0 X10E3/UL (ref 0–0.1)
IMM GRANULOCYTES NFR BLD AUTO: 0 %
LDLC SERPL CALC-MCNC: 54 MG/DL (ref 0–99)
LYMPHOCYTES # BLD AUTO: 1.8 X10E3/UL (ref 0.7–3.1)
LYMPHOCYTES NFR BLD AUTO: 21 %
MCH RBC QN AUTO: 29.4 PG (ref 26.6–33)
MCHC RBC AUTO-ENTMCNC: 33.2 G/DL (ref 31.5–35.7)
MCV RBC AUTO: 89 FL (ref 79–97)
MONOCYTES # BLD AUTO: 0.5 X10E3/UL (ref 0.1–0.9)
MONOCYTES NFR BLD AUTO: 6 %
NEUTROPHILS # BLD AUTO: 5.9 X10E3/UL (ref 1.4–7)
NEUTROPHILS NFR BLD AUTO: 70 %
PLATELET # BLD AUTO: 327 X10E3/UL (ref 150–450)
POTASSIUM SERPL-SCNC: 4.4 MMOL/L (ref 3.5–5.2)
PROT SERPL-MCNC: 7 G/DL (ref 6–8.5)
RBC # BLD AUTO: 4.53 X10E6/UL (ref 3.77–5.28)
SODIUM SERPL-SCNC: 140 MMOL/L (ref 134–144)
T4 FREE SERPL-MCNC: 1.64 NG/DL (ref 0.82–1.77)
TRIGL SERPL-MCNC: 63 MG/DL (ref 0–149)
TSH SERPL DL<=0.005 MIU/L-ACNC: 0.22 UIU/ML (ref 0.45–4.5)
VLDLC SERPL CALC-MCNC: 13 MG/DL (ref 5–40)
WBC # BLD AUTO: 8.5 X10E3/UL (ref 3.4–10.8)

## 2024-01-04 PROBLEM — R73.03 PREDIABETES: Status: ACTIVE | Noted: 2024-01-04

## 2024-01-04 LAB
HBA1C MFR BLD: 5.8 % (ref 4.8–5.6)
WRITTEN AUTHORIZATION: NORMAL

## 2024-01-10 ENCOUNTER — TELEPHONE (OUTPATIENT)
Dept: ENDOCRINOLOGY | Facility: CLINIC | Age: 59
End: 2024-01-10
Payer: COMMERCIAL

## 2024-01-10 NOTE — TELEPHONE ENCOUNTER
Provider: DR DEMARCO SANCHEZ     Caller: NAKUL RODARTE     Relationship to Patient: SELF     Phone Number: [phone]    Reason for Call: PT STATED THAT SOMEONE CALLED HER AND STATED THAT SHE NEEDS TO COME BACK IN 1 YEAR AND SHE WANTS TO MAKE SURE THIS IS RIGHT PLEASE ADVISE

## 2024-02-12 ENCOUNTER — HOSPITAL ENCOUNTER (OUTPATIENT)
Dept: BONE DENSITY | Facility: HOSPITAL | Age: 59
Discharge: HOME | End: 2024-02-12
Admitting: INTERNAL MEDICINE
Payer: COMMERCIAL

## 2024-02-12 DIAGNOSIS — Z78.0 POST-MENOPAUSAL: ICD-10-CM

## 2024-02-12 PROCEDURE — 77080 DXA BONE DENSITY AXIAL: CPT

## 2024-03-11 DIAGNOSIS — C50.919 MALIGNANT NEOPLASM OF BREAST IN FEMALE, ESTROGEN RECEPTOR POSITIVE, UNSPECIFIED LATERALITY, UNSPECIFIED SITE OF BREAST: ICD-10-CM

## 2024-03-11 DIAGNOSIS — Z17.0 MALIGNANT NEOPLASM OF BREAST IN FEMALE, ESTROGEN RECEPTOR POSITIVE, UNSPECIFIED LATERALITY, UNSPECIFIED SITE OF BREAST: ICD-10-CM

## 2024-03-12 RX ORDER — ANASTROZOLE 1 MG/1
TABLET ORAL
Qty: 90 TABLET | Refills: 3 | Status: SHIPPED | OUTPATIENT
Start: 2024-03-12

## 2024-04-23 ENCOUNTER — TELEPHONE (OUTPATIENT)
Dept: ONCOLOGY | Facility: CLINIC | Age: 59
End: 2024-04-23
Payer: COMMERCIAL

## 2024-05-13 ENCOUNTER — HOSPITAL ENCOUNTER (OUTPATIENT)
Dept: ULTRASOUND IMAGING | Facility: HOSPITAL | Age: 59
Discharge: HOME | End: 2024-05-13
Admitting: NURSE PRACTITIONER
Payer: COMMERCIAL

## 2024-05-13 ENCOUNTER — TELEPHONE (OUTPATIENT)
Dept: ONCOLOGY | Facility: CLINIC | Age: 59
End: 2024-05-13

## 2024-05-13 ENCOUNTER — TELEPHONE (OUTPATIENT)
Dept: FAMILY MEDICINE CLINIC | Facility: CLINIC | Age: 59
End: 2024-05-13
Payer: COMMERCIAL

## 2024-05-13 DIAGNOSIS — C50.919 MALIGNANT NEOPLASM OF BREAST IN FEMALE, ESTROGEN RECEPTOR POSITIVE, UNSPECIFIED LATERALITY, UNSPECIFIED SITE OF BREAST: Primary | ICD-10-CM

## 2024-05-13 DIAGNOSIS — Z17.0 MALIGNANT NEOPLASM OF BREAST IN FEMALE, ESTROGEN RECEPTOR POSITIVE, UNSPECIFIED LATERALITY, UNSPECIFIED SITE OF BREAST: Primary | ICD-10-CM

## 2024-05-13 DIAGNOSIS — E04.2 MULTINODULAR THYROID: ICD-10-CM

## 2024-05-13 PROCEDURE — 76536 US EXAM OF HEAD AND NECK: CPT

## 2024-05-13 NOTE — TELEPHONE ENCOUNTER
Called pt to let her know that the order has been entered, but is not due until June. I told her that she should receive a call to schedule, but also provided scheduling's number if she does not hear from them. Pt verbalized understanding.

## 2024-05-13 NOTE — TELEPHONE ENCOUNTER
Called and spoke to pt.  Pt states she had noticed she has not been scheduled for mammogram and requested one.  States that she is calling Dr Bautista's office to see if they will schedule this as they have been scheduling mammograms for pt in the past.

## 2024-05-13 NOTE — TELEPHONE ENCOUNTER
Appointment Request From: Lisseth Linder      With Provider: Susanne Avelar [-Primary Care Physician-]      Preferred Date Range: Any date 6/6/2024 or later      Preferred Times: Any      Reason: To address the following health maintenance concerns.   Mammogram      Comments:   I have this at [facility] every year.

## 2024-05-13 NOTE — TELEPHONE ENCOUNTER
Caller: Lisseth Linder    Relationship: Self    Best call back number: [phone]       Who are you requesting to speak with (clinical staff, provider,  specific staff member):CLINICAL      What was the call regarding: PATIENT REQUESTING ORDERS FOR YEARLY CHANCE

## 2024-06-06 ENCOUNTER — HOSPITAL ENCOUNTER (OUTPATIENT)
Dept: ULTRASOUND IMAGING | Facility: HOSPITAL | Age: 59
Discharge: HOME | End: 2024-06-06
Payer: COMMERCIAL

## 2024-06-06 ENCOUNTER — HOSPITAL ENCOUNTER (OUTPATIENT)
Dept: MAMMOGRAPHY | Facility: HOSPITAL | Age: 59
Discharge: HOME | End: 2024-06-06
Payer: COMMERCIAL

## 2024-06-06 DIAGNOSIS — C50.919 MALIGNANT NEOPLASM OF BREAST IN FEMALE, ESTROGEN RECEPTOR POSITIVE, UNSPECIFIED LATERALITY, UNSPECIFIED SITE OF BREAST: ICD-10-CM

## 2024-06-06 DIAGNOSIS — Z17.0 MALIGNANT NEOPLASM OF BREAST IN FEMALE, ESTROGEN RECEPTOR POSITIVE, UNSPECIFIED LATERALITY, UNSPECIFIED SITE OF BREAST: ICD-10-CM

## 2024-06-06 PROCEDURE — G0279 TOMOSYNTHESIS, MAMMO: HCPCS

## 2024-06-06 PROCEDURE — 77066 DX MAMMO INCL CAD BI: CPT

## 2024-06-06 NOTE — PROGRESS NOTES
Hematology/Oncology Outpatient Follow Up    PATIENT NAME:Lisseth Linder  :1965  MRN: [id number]  PRIMARY CARE PHYSICIAN: Susanne Avelar APRN  REFERRING PHYSICIAN: No ref. provider found    Chief Complaint   Patient presents with    Follow-up     Malignant neoplasm of breast in female, estrogen receptor positive, unspecified laterality, unspecified site of breast        HISTORY OF PRESENT ILLNESS:     This is a 58-year-old female who was originally seen on 10/6/14.  Patient’s original screening mammogram, not available.   14, she underwent needle localized excisional biopsy of the left breast mass and sentinel lymph node biopsy.  Tumor measured 1.1 cm, negative surgical margins.  ER positive, MO positive, HER-2/jamir negative.  14 - She had Oncotype DX assay performed on the tumor, which returned with a recurrent score of 10, which corresponds to 7% risk of distant recurrence following five years of Tamoxifen over a course of 10 years.  This places patient at low-risk disease.  ER was positive, MO positive and HER-2/jamir was negative on the validation assay.    14 - Patient completed accelerated breast radiation under the care of Dr. Pickett.  14 - Patient had a comprehensive genetic analysis with [facility].  This returned with no significant mutation identified, but she was found to have variant of unknown significance in the APC and BARD-1 genes.      14 - Patient initiated on Tamoxifen 20 mg daily.   6/8/15 - Bilateral digital diagnostic mammogram which showed fatty breasts and postsurgical changes on the left breast, otherwise negative.  Follow up in one year was recommended.  She also had an ultrasound of the left breast which did not show any abnormalities.   2/10/16 - EGD and colonoscopy were done.  On the EGD she was found to have erosive gastritis, Manuel erosions and a duodenal ulcer.  Colonoscopy showed tubular adenoma.   16 - Follow up mammogram was essentially  normal with stable post-op changes in the left breast.  Follow up in one year was recommended.    12/28/16 - Anemia work up revealed reticulocyte count (N), ferritin (L) 4, folate > 24, haptoglobin (N) 145, , iron binding capacity (H) 555.  SPEP with immunofixation assay did not reveal any monoclonal protein.  B12 (N) 353.  Methylmalonic acid level (N) 244.    1/11/16 - Urinalysis done showed small blood.  Urine culture did not grow any significant organisms.     6/16/17 - Bilateral mammogram with tomosynthesis was a stable exam without any abnormalities detected.    8/23/17 - Chemistry panel:  BUN 13, creatinine 0.7, liver function tests (N).  Estradiol level < 20.  FHS 31.5, also in the postmenopausal range.    December 2017 - Patient received Injectafer for iron deficiency anemia.  Received 750 mg Injectafer day 1 and day 8.  December 2017 - Ferritin 10.    12/13/17 - Tamoxifen discontinued.  Arimidex 1 mg daily and Os-Brenden D were initiated.    1/24/18 - DEXA scan, normal.    6/19/18 - Patient had bilateral diagnostic mammogram with tomosynthesis.  No mammographic evidence of malignancy.  Follow up in one year was recommended.    8/8/18 - Ferritin level was 227.  BUN 13.  Creatinine 0.6.   6/14/2019 patient had bilateral diagnostic mammogram with tomosynthesis there is no mammographic evidence of malignancy follow-up in 1 year was recommended.  2/4/2020-patient had a DEXA scan which showed a normal bone density  February 2020 patient had anemia work-up which showed persistent iron deficiency with a ferritin level of 4.  Patient was referred back to [facility] and is to receive  IV Injectafer but due to the pandemic she canceled her appointments  5/18/2020 patient had bilateral screening mammogram, there was no mammographic evidence of malignancy.  Follow-up in 1 year was recommended  HPI has been reviewed      Past Medical History:   Diagnosis Date    Adenomatous colon polyp     Allergic rhinitis     Asthma      Breast cancer     Cholelithiasis     Constipation     Duodenal ulcer     Erosive gastritis     Hiatal hernia     History of breast cancer     Hypothyroidism     Iron deficiency anemia     Microscopic hematuria     Prediabetes 01/02/2024    Primary hypertension 12/22/2021    SELMA (stress urinary incontinence, female)     Thyroid nodule        Past Surgical History:   Procedure Laterality Date    AUGMENTATION MAMMAPLASTY  1989    BREAST LUMPECTOMY Left 2014    BREAST RECONSTRUCTION Left 2014    COLONOSCOPY  01/06/2021    normal; repeat 5 years; Dr. Daniels    ENDOSCOPY  01/06/2021    Large 8 cm hiatal hernia; Dr. Daniels    SENTINEL LYMPH NODE BIOPSY           Current Outpatient Medications:     albuterol sulfate  (90 Base) MCG/ACT inhaler, Inhale 2 puffs., Disp: , Rfl:     anastrozole (ARIMIDEX) 1 MG tablet, TAKE 1 TABLET DAILY, Disp: 90 tablet, Rfl: 3    Calcium Carbonate-Vitamin D 600-200 MG-UNIT tablet, 2 (Two) Times a Day., Disp: , Rfl:     cetirizine (zyrTEC) 10 MG tablet, Take 1 tablet by mouth Daily. At bedtime for allergies, Disp: 90 tablet, Rfl: 3    fluticasone (FLONASE) 50 MCG/ACT nasal spray, 2 sprays in each nostril once daily for allergies, Disp: 15.8 mL, Rfl: 11    Multiple Vitamin (MULTI-VITAMIN) tablet, MULTI-VITAMIN TABS, Disp: , Rfl:     pantoprazole (PROTONIX) 40 MG EC tablet, TAKE 1 TABLET DAILY, Disp: 90 tablet, Rfl: 3    No Known Allergies    Family History   Problem Relation Age of Onset    Diabetes Mother     Hypertension Mother     Hyperlipidemia Mother     High cholesterol Mother     Kidney cancer Father     Prostate cancer Father     Cancer Father     Hypertension Father     Breast cancer Maternal Aunt     Prostate cancer Paternal Aunt     Lung cancer Paternal Uncle     Breast cancer Cousin     Heart disease Other         Grandparents       Cancer-related family history includes Breast cancer in her cousin and maternal aunt; Cancer in her father; Kidney cancer in her father; Lung  "cancer in her paternal uncle; Prostate cancer in her father and paternal aunt.    Social History     Tobacco Use    Smoking status: Never    Smokeless tobacco: Never   Vaping Use    Vaping status: Never Used   Substance Use Topics    Alcohol use: No    Drug use: Never       I have reviewed and confirmed the accuracy of the patient's history: Chief complaint, HPI, ROS, and Subjective as entered by the MA/LPN/RN. Courtney Kierra Bautista MD 06/07/24          SUBJECTIVE:    Patient does not have any specific complaints today.  She performs breast exams and does not report any changes.    Patient does not have any new issues today.  She takes her endocrine therapy regularly        REVIEW OF SYSTEMS:     Review of Systems   Constitutional:  Negative for chills and fever.   HENT:  Negative for ear pain, mouth sores, nosebleeds and sore throat.    Eyes:  Negative for photophobia and visual disturbance.   Respiratory:  Negative for wheezing and stridor.    Cardiovascular:  Negative for chest pain and palpitations.   Gastrointestinal:  Negative for abdominal pain, diarrhea, nausea and vomiting.   Endocrine: Negative for cold intolerance and heat intolerance.   Genitourinary:  Negative for dysuria and hematuria.   Musculoskeletal:  Negative for joint swelling and neck stiffness.   Skin:  Negative for color change and rash.   Neurological:  Negative for seizures and syncope.   Hematological:  Negative for adenopathy.        No obvious bleeding   Psychiatric/Behavioral:  Negative for agitation, confusion and hallucinations.        OBJECTIVE:    Vitals:    06/07/24 1210   BP: 145/80   Pulse: 79   Resp: 18   Temp: 98 °F (36.7 °C)   TempSrc: Infrared   SpO2: 96%   Weight: 83 kg (183 lb)   Height: 162.6 cm (64\")   PainSc: 0-No pain           ECOG    (0) Fully active, able to carry on all predisease performance without restriction    Physical Exam   Constitutional: She is oriented to person, place, and time. No distress.   HENT: "   Head: Normocephalic and atraumatic.   Eyes: Conjunctivae are normal. Right eye exhibits no discharge. Left eye exhibits no discharge. No scleral icterus.   Neck: No thyromegaly present.   Cardiovascular: Normal rate, regular rhythm and normal heart sounds. Exam reveals no gallop and no friction rub.   Pulmonary/Chest: Effort normal. No stridor. No respiratory distress. She has no wheezes. Right breast exhibits tenderness. Right breast exhibits no inverted nipple, no mass, no nipple discharge and no skin change. Left breast exhibits no inverted nipple, no mass, no nipple discharge, no skin change and no tenderness. No breast bleeding. Breasts are symmetrical.   Bilateral breast exam was negative  Bilateral axillary exam was negative   Abdominal: Soft. Bowel sounds are normal. She exhibits no mass. There is no abdominal tenderness. There is no rebound and no guarding.   Genitourinary: No breast bleeding.   Musculoskeletal: Normal range of motion. No tenderness.   Lymphadenopathy:     She has no cervical adenopathy.   Neurological: She is alert and oriented to person, place, and time. She exhibits normal muscle tone.   Skin: Skin is warm. No rash noted. She is not diaphoretic. No erythema.   Psychiatric: Her behavior is normal.   Nursing note and vitals reviewed.    Physical exam as documented above    I have reexamined the patient and the results are consistent with the previously documented exam. Courtney Bautista MD      RECENT LABS    WBC   Date Value Ref Range Status   06/07/2024 7.89 3.40 - 10.80 10*3/mm3 Final   01/02/2024 8.5 3.4 - 10.8 x10E3/uL Final     RBC   Date Value Ref Range Status   06/07/2024 4.52 3.77 - 5.28 10*6/mm3 Final   01/02/2024 4.53 3.77 - 5.28 x10E6/uL Final     Hemoglobin   Date Value Ref Range Status   06/07/2024 13.7 12.0 - 15.9 g/dL Final     Hematocrit   Date Value Ref Range Status   06/07/2024 41.3 34.0 - 46.6 % Final     MCV   Date Value Ref Range Status   06/07/2024 91.4 79.0 -  97.0 fL Final     MCH   Date Value Ref Range Status   06/07/2024 30.3 26.6 - 33.0 pg Final     MCHC   Date Value Ref Range Status   06/07/2024 33.2 31.5 - 35.7 g/dL Final     RDW   Date Value Ref Range Status   06/07/2024 13.0 12.3 - 15.4 % Final     RDW-SD   Date Value Ref Range Status   06/07/2024 43.0 37.0 - 54.0 fl Final     MPV   Date Value Ref Range Status   06/07/2024 9.9 6.0 - 12.0 fL Final     Platelets   Date Value Ref Range Status   06/07/2024 312 140 - 450 10*3/mm3 Final     Neutrophil %   Date Value Ref Range Status   06/07/2024 56.1 42.7 - 76.0 % Final     Lymphocyte %   Date Value Ref Range Status   06/07/2024 30.0 19.6 - 45.3 % Final     Monocyte %   Date Value Ref Range Status   06/07/2024 10.4 5.0 - 12.0 % Final     Eosinophil %   Date Value Ref Range Status   06/07/2024 3.2 0.3 - 6.2 % Final     Basophil %   Date Value Ref Range Status   06/07/2024 0.3 0.0 - 1.5 % Final     Neutrophils, Absolute   Date Value Ref Range Status   06/07/2024 4.43 1.70 - 7.00 10*3/mm3 Final     Lymphocytes, Absolute   Date Value Ref Range Status   06/07/2024 2.37 0.70 - 3.10 10*3/mm3 Final     Monocytes, Absolute   Date Value Ref Range Status   06/07/2024 0.82 0.10 - 0.90 10*3/mm3 Final     Eosinophils, Absolute   Date Value Ref Range Status   06/07/2024 0.25 0.00 - 0.40 10*3/mm3 Final     Basophils, Absolute   Date Value Ref Range Status   06/07/2024 0.02 0.00 - 0.20 10*3/mm3 Final       Lab Results   Component Value Date    GLUCOSE 105 (H) 01/02/2024    BUN 16 01/02/2024    CREATININE 0.70 01/02/2024    EGFRIFNONA 82 01/27/2022    EGFRIFAFRI 95 01/27/2022    BCR 23 01/02/2024    K 4.4 01/02/2024    CO2 24 01/02/2024    CALCIUM 9.9 01/02/2024    PROTENTOTREF 7.0 01/02/2024    ALBUMIN 4.4 01/02/2024    LABIL2 1.7 01/02/2024    AST 17 01/02/2024    ALT 16 01/02/2024         ASSESSMENT:    Invasive ductal carcinoma involving the left breast, status post left excisional biopsy with sentinel lymph node biopsy, ER positive,  IA positive, HER-2/jamir negative for T1c N0 M0.  Ongoing surveillance  She has completed accelerated partial breast radiation under the care of Dr. Pickett.  Low recurrence score on Oncotype DX assay.     Family history of three relatives with breast cancer including patient, worrisome for hereditary breast cancer syndrome.   Negative for deleterious mutation, but patient was found to have variant of unknown significance in the APC and BARD-1 genes.  Status post EGD which showed erosive gastritis, Manuel erosions and duodenal ulcer in February 2016.  Status post colonoscopy which showed tubular adenoma in February 2016.  Scheduled for repeat EGD colonoscopy January 2021 by Dr. Daniels.  Hemoglobin is stable  Long history of hematuria.  Follows  up with urology  Status post Tamoxifen 12/22/14 to 12/13/17.  Arimidex initiated 12/13/17.  Monitoring for side effects.  Patient will continue Arimidex till December 2025.  Reviewed  Recurrent anemia due to iron deficiency: Has resolved  Assessment has been reviewed and updated      PLANS:    CBC is normal    CMP ordered      Last bone density was completed 2/12/2024 was normal.  Next bone density will be due June 2, 2012 2026    Continue Arimidex and Os-Brenden D.  Reviewed    Her bilateral diagnostic mammogram will be due June 2025.  Reviewed her last mammogram    Continue monthly breast self-exam and call for lumps, nipple discharge, skin discoloration or breast pain.  She will continue Arimidex and Os-Brenden D.  Reviewed               I have reviewed labs results, imaging, vitals, and medications with the patient today. Will follow up in 6 months with me.         Patient verbalized understanding and is in agreement of the above plan.      I spent 30 total minutes, face-to-face, caring for Lisseth today. 90% of this time involved counseling and/or coordination of care as documented within this note.

## 2024-06-07 ENCOUNTER — OFFICE VISIT (OUTPATIENT)
Dept: ONCOLOGY | Facility: CLINIC | Age: 59
End: 2024-06-07
Payer: COMMERCIAL

## 2024-06-07 ENCOUNTER — LAB (OUTPATIENT)
Dept: LAB | Facility: HOSPITAL | Age: 59
End: 2024-06-07
Payer: COMMERCIAL

## 2024-06-07 VITALS
WEIGHT: 183 LBS | DIASTOLIC BLOOD PRESSURE: 80 MMHG | HEART RATE: 79 BPM | TEMPERATURE: 98 F | SYSTOLIC BLOOD PRESSURE: 145 MMHG | OXYGEN SATURATION: 96 % | RESPIRATION RATE: 18 BRPM | BODY MASS INDEX: 31.24 KG/M2 | HEIGHT: 64 IN

## 2024-06-07 DIAGNOSIS — Z17.0 MALIGNANT NEOPLASM OF BREAST IN FEMALE, ESTROGEN RECEPTOR POSITIVE, UNSPECIFIED LATERALITY, UNSPECIFIED SITE OF BREAST: Primary | ICD-10-CM

## 2024-06-07 DIAGNOSIS — C50.919 MALIGNANT NEOPLASM OF BREAST IN FEMALE, ESTROGEN RECEPTOR POSITIVE, UNSPECIFIED LATERALITY, UNSPECIFIED SITE OF BREAST: Primary | ICD-10-CM

## 2024-06-07 LAB
ALBUMIN SERPL-MCNC: 4.3 G/DL (ref 3.5–5.2)
ALBUMIN/GLOB SERPL: 1.5 G/DL
ALP SERPL-CCNC: 125 U/L (ref 39–117)
ALT SERPL W P-5'-P-CCNC: 19 U/L (ref 1–33)
ANION GAP SERPL CALCULATED.3IONS-SCNC: 10.2 MMOL/L (ref 5–15)
AST SERPL-CCNC: 20 U/L (ref 1–32)
BASOPHILS # BLD AUTO: 0.02 10*3/MM3 (ref 0–0.2)
BASOPHILS NFR BLD AUTO: 0.3 % (ref 0–1.5)
BILIRUB SERPL-MCNC: 0.5 MG/DL (ref 0–1.2)
BUN SERPL-MCNC: 14 MG/DL (ref 8–23)
BUN/CREAT SERPL: 18.9 (ref 7–25)
CALCIUM SPEC-SCNC: 10.2 MG/DL (ref 8.6–10.5)
CHLORIDE SERPL-SCNC: 102 MMOL/L (ref 98–107)
CO2 SERPL-SCNC: 26.8 MMOL/L (ref 22–29)
CREAT SERPL-MCNC: 0.74 MG/DL (ref 0.57–1)
DEPRECATED RDW RBC AUTO: 43 FL (ref 37–54)
EGFRCR SERPLBLD CKD-EPI 2021: 92.8 ML/MIN/1.73
EOSINOPHIL # BLD AUTO: 0.25 10*3/MM3 (ref 0–0.4)
EOSINOPHIL NFR BLD AUTO: 3.2 % (ref 0.3–6.2)
ERYTHROCYTE [DISTWIDTH] IN BLOOD BY AUTOMATED COUNT: 13 % (ref 12.3–15.4)
GLOBULIN UR ELPH-MCNC: 2.8 GM/DL
GLUCOSE SERPL-MCNC: 84 MG/DL (ref 65–99)
HCT VFR BLD AUTO: 41.3 % (ref 34–46.6)
HGB BLD-MCNC: 13.7 G/DL (ref 12–15.9)
HOLD SPECIMEN: NORMAL
HOLD SPECIMEN: NORMAL
LYMPHOCYTES # BLD AUTO: 2.37 10*3/MM3 (ref 0.7–3.1)
LYMPHOCYTES NFR BLD AUTO: 30 % (ref 19.6–45.3)
MCH RBC QN AUTO: 30.3 PG (ref 26.6–33)
MCHC RBC AUTO-ENTMCNC: 33.2 G/DL (ref 31.5–35.7)
MCV RBC AUTO: 91.4 FL (ref 79–97)
MONOCYTES # BLD AUTO: 0.82 10*3/MM3 (ref 0.1–0.9)
MONOCYTES NFR BLD AUTO: 10.4 % (ref 5–12)
NEUTROPHILS NFR BLD AUTO: 4.43 10*3/MM3 (ref 1.7–7)
NEUTROPHILS NFR BLD AUTO: 56.1 % (ref 42.7–76)
PLATELET # BLD AUTO: 312 10*3/MM3 (ref 140–450)
PMV BLD AUTO: 9.9 FL (ref 6–12)
POTASSIUM SERPL-SCNC: 4.2 MMOL/L (ref 3.5–5.2)
PROT SERPL-MCNC: 7.1 G/DL (ref 6–8.5)
RBC # BLD AUTO: 4.52 10*6/MM3 (ref 3.77–5.28)
SODIUM SERPL-SCNC: 139 MMOL/L (ref 136–145)
WBC NRBC COR # BLD AUTO: 7.89 10*3/MM3 (ref 3.4–10.8)

## 2024-06-07 PROCEDURE — 36415 COLL VENOUS BLD VENIPUNCTURE: CPT

## 2024-06-07 PROCEDURE — 85025 COMPLETE CBC W/AUTO DIFF WBC: CPT

## 2024-06-07 PROCEDURE — 80053 COMPREHEN METABOLIC PANEL: CPT | Performed by: INTERNAL MEDICINE

## 2024-06-22 LAB
T3FREE SERPL-MCNC: 3.5 PG/ML (ref 2–4.4)
T4 FREE SERPL-MCNC: 1.55 NG/DL (ref 0.82–1.77)
TSH SERPL DL<=0.005 MIU/L-ACNC: 0.22 UIU/ML (ref 0.45–4.5)
TSI SER-ACNC: <0.1 IU/L (ref 0–0.55)

## 2024-06-28 ENCOUNTER — OFFICE VISIT (OUTPATIENT)
Dept: ENDOCRINOLOGY | Facility: CLINIC | Age: 59
End: 2024-06-28
Payer: COMMERCIAL

## 2024-06-28 VITALS
DIASTOLIC BLOOD PRESSURE: 82 MMHG | WEIGHT: 176 LBS | HEIGHT: 64 IN | HEART RATE: 105 BPM | OXYGEN SATURATION: 94 % | SYSTOLIC BLOOD PRESSURE: 132 MMHG | BODY MASS INDEX: 30.05 KG/M2

## 2024-06-28 DIAGNOSIS — I10 HYPERTENSION, UNSPECIFIED TYPE: ICD-10-CM

## 2024-06-28 DIAGNOSIS — E05.90 SUBCLINICAL HYPERTHYROIDISM: ICD-10-CM

## 2024-06-28 DIAGNOSIS — E66.9 CLASS 1 OBESITY WITH BODY MASS INDEX (BMI) OF 30.0 TO 30.9 IN ADULT, UNSPECIFIED OBESITY TYPE, UNSPECIFIED WHETHER SERIOUS COMORBIDITY PRESENT: ICD-10-CM

## 2024-06-28 DIAGNOSIS — E04.2 NONTOXIC MULTINODULAR GOITER: Primary | ICD-10-CM

## 2024-06-28 NOTE — PROGRESS NOTES
-----------------------------------------------------------------  ENDOCRINE CLINIC NOTE  -----------------------------------------------------------------        PATIENT NAME: Lisseth Linder  PATIENT : 1965 AGE: 59 y.o.  MRN NUMBER: [id number]  PRIMARY CARE: Susanne Avelar APRN    ==========================================================================    CHIEF COMPLAINT: Thyroid Nodule and Subclinical Hyperthyroidism  DATE OF SERVICE: 24     ==========================================================================    HPI / SUBJECTIVE    59 y.o. female is seen in the clinic today for follow-up of thyroid nodule and subclinical hyperthyroidism.  Known to have thyroid nodule for last 10 years time.  Last visit was 2023.  Patient denied any neck swelling, swallowing issues or shortness of breath.  Denies any exposure to radiation.  Denies any neck surgery or thyroid surgery.  No symptoms of thyroid function disorder.  History of breast cancer and currently on anastrozole therapy.  Repeat DEXA scan in 2024 which showed normal bone health.  Last thyroid profile 2024 showed mildly suppressed TSH with normal free T4.  Family history of Graves' disease.  Patient recently had repeat thyroid ultrasound on 2024.    ==========================================================================                                                PAST MEDICAL HISTORY    Past Medical History:   Diagnosis Date    Adenomatous colon polyp     Allergic rhinitis     Asthma     Breast cancer     Cholelithiasis     Constipation     Duodenal ulcer     Erosive gastritis     Hiatal hernia     History of breast cancer     Hypothyroidism     Iron deficiency anemia     Microscopic hematuria     Prediabetes 2024    Primary hypertension 2021    SELMA (stress urinary incontinence, female)     Thyroid nodule         ==========================================================================    PAST SURGICAL HISTORY    Past Surgical History:   Procedure Laterality Date    AUGMENTATION MAMMAPLASTY  1989    BREAST LUMPECTOMY Left 2014    BREAST RECONSTRUCTION Left 2014    COLONOSCOPY  01/06/2021    normal; repeat 5 years; Dr. Daniels    ENDOSCOPY  01/06/2021    Large 8 cm hiatal hernia; Dr. Daniels    SENTINEL LYMPH NODE BIOPSY         ==========================================================================    FAMILY HISTORY    Family History   Problem Relation Age of Onset    Diabetes Mother     Hypertension Mother     Hyperlipidemia Mother     High cholesterol Mother     Kidney cancer Father     Prostate cancer Father     Cancer Father     Hypertension Father     Breast cancer Maternal Aunt     Prostate cancer Paternal Aunt     Lung cancer Paternal Uncle     Breast cancer Cousin     Heart disease Other         Grandparents       ==========================================================================    SOCIAL HISTORY    Social History     Socioeconomic History    Marital status:      Spouse name: Edu Wheeler    Number of children: 2    Highest education level: High school graduate   Tobacco Use    Smoking status: Never    Smokeless tobacco: Never   Vaping Use    Vaping status: Never Used   Substance and Sexual Activity    Alcohol use: No    Drug use: Never    Sexual activity: Yes     Partners: Male     Birth control/protection: Post-menopausal       ==========================================================================    MEDICATIONS      Current Outpatient Medications:     albuterol sulfate  (90 Base) MCG/ACT inhaler, Inhale 2 puffs., Disp: , Rfl:     anastrozole (ARIMIDEX) 1 MG tablet, TAKE 1 TABLET DAILY, Disp: 90 tablet, Rfl: 3    Calcium Carbonate-Vitamin D 600-200 MG-UNIT tablet, 2 (Two) Times a Day., Disp: , Rfl:     cetirizine (zyrTEC) 10 MG tablet, Take 1 tablet by mouth Daily. At bedtime  for allergies, Disp: 90 tablet, Rfl: 3    fluticasone (FLONASE) 50 MCG/ACT nasal spray, 2 sprays in each nostril once daily for allergies, Disp: 15.8 mL, Rfl: 11    Multiple Vitamin (MULTI-VITAMIN) tablet, MULTI-VITAMIN TABS, Disp: , Rfl:     pantoprazole (PROTONIX) 40 MG EC tablet, TAKE 1 TABLET DAILY, Disp: 90 tablet, Rfl: 3    ==========================================================================    ALLERGIES    No Known Allergies    ==========================================================================    OBJECTIVE    Vitals:    06/28/24 1437   BP: 132/82   Pulse: 105   SpO2: 94%     Body mass index is 30.21 kg/m².     General: Alert, cooperative, no acute distress  Thyroid:  no enlargement/tenderness/palpable nodules  Back: Symmetric, no curvature, ROM normal  Lungs: Clear to auscultation bilaterally, respirations unlabored  Heart: Regular rate and rhythm, S1 and S2 normal, no murmur, rub or gallop  Abdomen: Soft, NT, ND and Bowel sounds Positive  Extremities:  Extremities normal, atraumatic, no cyanosis or edema    ==========================================================================    LAB EVALUATION    Lab Results   Component Value Date    GLUCOSE 84 06/07/2024    BUN 14 06/07/2024    CREATININE 0.74 06/07/2024    EGFRIFNONA 82 01/27/2022    EGFRIFAFRI 95 01/27/2022    BCR 18.9 06/07/2024    K 4.2 06/07/2024    CO2 26.8 06/07/2024    CALCIUM 10.2 06/07/2024    PROTENTOTREF 7.0 01/02/2024    ALBUMIN 4.3 06/07/2024    LABIL2 1.7 01/02/2024    AST 20 06/07/2024    ALT 19 06/07/2024    CHOL 182 10/16/2018    TRIG 63 01/02/2024    HDL 63 01/02/2024    LDL 54 01/02/2024     Lab Results   Component Value Date    HGBA1C 5.8 (H) 01/02/2024     Lab Results   Component Value Date    CREATININE 0.74 06/07/2024     Lab Results   Component Value Date    TSH 0.221 (L) 06/20/2024    FREET4 1.55 06/20/2024      Latest Reference Range & Units 06/20/24 10:46   TSH Baseline 0.450 - 4.500 uIU/mL 0.221 (L)   Free T4  0.82 - 1.77 ng/dL 1.55   T3, Free 2.0 - 4.4 pg/mL 3.5   Thyroid Stimulating Immunoglobulin 0.00 - 0.55 IU/L <0.10   (L): Data is abnormally low    ==========================================================================    Thyroid Nodules:    5/13/2024  Right thyroid lobe: 4.6 x 2.0 x 1.5 cm  Left thyroid lobe: 3.7 x 1.7 x 1.5 cm  Isthmus: 3.5 mm    Multiple morphologically similar circumscribed isodense subtotally hypoechoic wider than 12 nodules with smooth margins and no echogenic foci with the largest in the mid left thyroid measuring 1.1 x 1.3 x 0.7 cm the others are 1 cm or less.  As per the ultrasound report these nodules have been stable since 11/14/2018 and stable for last 5 years time.    5/15/2023    Right Lobe: 3.8 x 1.8 x 1.6 cms  Left Lobe: 3.2 x 1.5 x 1.6 cms    Right inferior nodule: 1.2 x 0.9 x 1.3 cms hypoechoic and lobulated  Left upper pole: 1.1 x 0.9 x 0.8 cms  Left inferior pold: 0.8 x 0.7 x 1.0 cms  Left medial closer to isthmus nodule: 0.9 x 0.5 x 0.9 cms      5/16/2022    Right lobe:  4.2 x 1.5 x 1.7 cm  Left lobe:  3.6 x 1.6 x 1.9 cm.  Isthmus:  3 mm in thickness.     Right inferior and central: 1.2 x 0.9 x 1.1 cm, previously 1.0 x 0.8 x 1.0 cm in 2020 and 1.3  x 0.8 x 1.1 cm in 2018, solid/almost completely solid, hypoechoic, wider than tall, ill-defined  Left mid and lateral: 1.1 x 0.7 x 0.7 cm, previously 0.9 x 0.7 x 0.7 cm in 2020, not  seen on 2018 ultrasound, solid, isoechoic, wider than tall   Left mid and lateral: 0.6 x 0.3 x 0.4 cm, previously 0.5 x 0.3 x 0.4 cm in 2020 and 0.4  cm in 2018, solid, hypoechoic  Left side of the isthmus: 0.9 x 0.3 x 0.8 cm, previously 1.0 x 0.5 x 0.9 cm in 2020 and 0.8  x 0.5 cm in 2018. Solid, hypoechoic and wider than tall.      ==========================================================================    ASSESSMENT AND PLAN    # Nontoxic multinodular goiter  # Subclinical hyperthyroidism without evidence of toxic nodule on uptake scan  #  "Hypertension  # Obesity with BMI 30.21    Plan:  - Patient have repeat ultrasound on May 13, 2024  - As per radiologist report these nodules as compared to ultrasound in November 2018 are remaining stable and no changes and therefore no need to continued monitoring but of the primary care and patient plans to follow can follow it every 2 years time  - Blood work still show evidence of subclinical hyperthyroidism but patient does not qualify for surgery given age less than 65, repeat DEXA scan showed no evidence of osteoporosis and TSH more than 0.1, patient can continue following thyroid function once a year  - TSI levels are also negative and patient previously had uptake scan which was negative as well  - Medical treatment at this time does not surpasses risk-benefit and therefore patient to continue clinical monitoring once a year with TSH and free T4 levels  - Patient to follow-up with her primary care on yearly basis to be monitored for thyroid function and can be seen in the endocrinology clinic as needed    Thank you for courtesy of consultation.    Return to clinic: 1 year    Entire assessment and plan was discussed and counseled the patient in detail to which patient verbalized understanding and agreed with care.  Answered all queries and concerns.    This note was created using voice recognition software and is inherently subject to errors including those of syntax and \"sound-alike\" substitutions which may escape proofreading.  In such instances, original meaning may be extrapolated by contextual derivation.    ==========================================================================    INFORMATION PROVIDED TO PATIENT    Patient Instructions   Please,    As there is no need for any further thyroid ultrasound evaluations but if you plan to still pursue ultrasound please follow thyroid ultrasound every 2 years.  You can continue follow-up with your primary care and monitor thyroid function labs every 1 year " time.    Thank you for your visit today.    If you have any questions or concerns please feel free to reach out of the office.      ==========================================================================  Chirag Pierre MD  Department of Endocrine, Diabetes and Metabolism  [place], [place]  ==========================================================================

## 2024-06-28 NOTE — PATIENT INSTRUCTIONS
Please,    As there is no need for any further thyroid ultrasound evaluations but if you plan to still pursue ultrasound please follow thyroid ultrasound every 2 years.  You can continue follow-up with your primary care and monitor thyroid function labs every 1 year time.    Thank you for your visit today.    If you have any questions or concerns please feel free to reach out of the office.

## 2024-10-15 ENCOUNTER — TELEPHONE (OUTPATIENT)
Dept: FAMILY MEDICINE CLINIC | Facility: CLINIC | Age: 59
End: 2024-10-15
Payer: COMMERCIAL

## 2024-10-15 NOTE — TELEPHONE ENCOUNTER
Caller: Lisseth Linder    Relationship: Self    Best call back number:     [phone]       What medication are you requesting: SOMETHING FOR A POSSIBLE INFECTION    What are your current symptoms: LEFT EYE RED AND IRRIATED,LEFT EYE WATERY    How long have you been experiencing symptoms: 1 DAY    Have you had these symptoms before:    [] Yes  [] No    Have you been treated for these symptoms before:   [] Yes  [] No    If a prescription is needed, what is your preferred pharmacy and phone number: [id number] - [place] [address] [address] AT [facility] OF  &  [address] - [phone] [facility] [phone]      Additional notes: GRAND DAUGHTER SCRATCHED HER EYE

## 2024-10-29 DIAGNOSIS — J30.1 NON-SEASONAL ALLERGIC RHINITIS DUE TO POLLEN: ICD-10-CM

## 2024-10-29 RX ORDER — CETIRIZINE HYDROCHLORIDE 10 MG/1
10 TABLET ORAL DAILY
Qty: 90 TABLET | Refills: 0 | Status: SHIPPED | OUTPATIENT
Start: 2024-10-29

## 2024-12-02 RX ORDER — PANTOPRAZOLE SODIUM 40 MG/1
TABLET, DELAYED RELEASE ORAL
Qty: 90 TABLET | Refills: 3 | Status: SHIPPED | OUTPATIENT
Start: 2024-12-02

## 2024-12-12 NOTE — PROGRESS NOTES
Hematology/Oncology Outpatient Follow Up    PATIENT NAME:Lisseth Linder  :1965  MRN: [id number]  PRIMARY CARE PHYSICIAN: Susanne Avelar APRN  REFERRING PHYSICIAN: Susanne Avelar APRN    Chief Complaint   Patient presents with    Follow-up     Malignant neoplasm of breast in female, estrogen receptor positive, unspecified laterality, unspecified site of breast        HISTORY OF PRESENT ILLNESS:     This is a 58-year-old female who was originally seen on 10/6/14.  Patient’s original screening mammogram, not available.   14, she underwent needle localized excisional biopsy of the left breast mass and sentinel lymph node biopsy.  Tumor measured 1.1 cm, negative surgical margins.  ER positive, VT positive, HER-2/jamir negative.  14 - She had Oncotype DX assay performed on the tumor, which returned with a recurrent score of 10, which corresponds to 7% risk of distant recurrence following five years of Tamoxifen over a course of 10 years.  This places patient at low-risk disease.  ER was positive, VT positive and HER-2/jamir was negative on the validation assay.    14 - Patient completed accelerated breast radiation under the care of Dr. Pickett.  14 - Patient had a comprehensive genetic analysis with [facility].  This returned with no significant mutation identified, but she was found to have variant of unknown significance in the APC and BARD-1 genes.      14 - Patient initiated on Tamoxifen 20 mg daily.   6/8/15 - Bilateral digital diagnostic mammogram which showed fatty breasts and postsurgical changes on the left breast, otherwise negative.  Follow up in one year was recommended.  She also had an ultrasound of the left breast which did not show any abnormalities.   2/10/16 - EGD and colonoscopy were done.  On the EGD she was found to have erosive gastritis, Manuel erosions and a duodenal ulcer.  Colonoscopy showed tubular adenoma.   16 - Follow up mammogram was essentially  normal with stable post-op changes in the left breast.  Follow up in one year was recommended.    12/28/16 - Anemia work up revealed reticulocyte count (N), ferritin (L) 4, folate > 24, haptoglobin (N) 145, , iron binding capacity (H) 555.  SPEP with immunofixation assay did not reveal any monoclonal protein.  B12 (N) 353.  Methylmalonic acid level (N) 244.    1/11/16 - Urinalysis done showed small blood.  Urine culture did not grow any significant organisms.     6/16/17 - Bilateral mammogram with tomosynthesis was a stable exam without any abnormalities detected.    8/23/17 - Chemistry panel:  BUN 13, creatinine 0.7, liver function tests (N).  Estradiol level < 20.  FHS 31.5, also in the postmenopausal range.    December 2017 - Patient received Injectafer for iron deficiency anemia.  Received 750 mg Injectafer day 1 and day 8.  December 2017 - Ferritin 10.    12/13/17 - Tamoxifen discontinued.  Arimidex 1 mg daily and Os-Brenden D were initiated.    1/24/18 - DEXA scan, normal.    6/19/18 - Patient had bilateral diagnostic mammogram with tomosynthesis.  No mammographic evidence of malignancy.  Follow up in one year was recommended.    8/8/18 - Ferritin level was 227.  BUN 13.  Creatinine 0.6.   6/14/2019 patient had bilateral diagnostic mammogram with tomosynthesis there is no mammographic evidence of malignancy follow-up in 1 year was recommended.  2/4/2020-patient had a DEXA scan which showed a normal bone density  February 2020 patient had anemia work-up which showed persistent iron deficiency with a ferritin level of 4.  Patient was referred back to [facility] and is to receive  IV Injectafer but due to the pandemic she canceled her appointments  5/18/2020 patient had bilateral screening mammogram, there was no mammographic evidence of malignancy.  Follow-up in 1 year was recommended  2/12/2024: DEXA scan with normal bone density  6/6/2024: Bilateral diagnostic mammogram with no evidence of malignancy      Past  Medical History:   Diagnosis Date    Adenomatous colon polyp     Allergic rhinitis     Asthma     Breast cancer     Cholelithiasis     Constipation     Duodenal ulcer     Erosive gastritis     Hiatal hernia     History of breast cancer     Hypothyroidism     Iron deficiency anemia     Microscopic hematuria     Prediabetes 01/02/2024    Primary hypertension 12/22/2021    SELMA (stress urinary incontinence, female)     Thyroid nodule        Past Surgical History:   Procedure Laterality Date    AUGMENTATION MAMMAPLASTY  1989    BREAST LUMPECTOMY Left 2014    BREAST RECONSTRUCTION Left 2014    COLONOSCOPY  01/06/2021    normal; repeat 5 years; Dr. Daniels    ENDOSCOPY  01/06/2021    Large 8 cm hiatal hernia; Dr. Daniels    SENTINEL LYMPH NODE BIOPSY           Current Outpatient Medications:     albuterol sulfate  (90 Base) MCG/ACT inhaler, Inhale 2 puffs., Disp: , Rfl:     anastrozole (ARIMIDEX) 1 MG tablet, TAKE 1 TABLET DAILY, Disp: 90 tablet, Rfl: 3    Calcium Carbonate-Vitamin D 600-200 MG-UNIT tablet, 2 (Two) Times a Day., Disp: , Rfl:     cetirizine (zyrTEC) 10 MG tablet, TAKE 1 TABLET BY MOUTH DAILY AT BEDTIME FOR ALLERGIES., Disp: 90 tablet, Rfl: 0    fluticasone (FLONASE) 50 MCG/ACT nasal spray, 2 sprays in each nostril once daily for allergies, Disp: 15.8 mL, Rfl: 11    Multiple Vitamin (MULTI-VITAMIN) tablet, MULTI-VITAMIN TABS, Disp: , Rfl:     pantoprazole (PROTONIX) 40 MG EC tablet, TAKE 1 TABLET DAILY, Disp: 90 tablet, Rfl: 3    No Known Allergies    Family History   Problem Relation Age of Onset    Diabetes Mother     Hypertension Mother     Hyperlipidemia Mother     High cholesterol Mother     Kidney cancer Father     Prostate cancer Father     Cancer Father     Hypertension Father     Breast cancer Maternal Aunt     Prostate cancer Paternal Aunt     Lung cancer Paternal Uncle     Breast cancer Cousin     Heart disease Other         Grandparents       Cancer-related family history includes Breast  "cancer in her cousin and maternal aunt; Cancer in her father; Kidney cancer in her father; Lung cancer in her paternal uncle; Prostate cancer in her father and paternal aunt.    Social History     Tobacco Use    Smoking status: Never    Smokeless tobacco: Never   Vaping Use    Vaping status: Never Used   Substance Use Topics    Alcohol use: No    Drug use: Never       I have reviewed and confirmed the accuracy of the patient's history: Chief complaint, HPI, ROS, and Subjective as entered by the MA/LPN/RN. 12/13/24          SUBJECTIVE:  Lisseth is here today for her routine 6-month follow-up.  She reports that she is doing very well.  She is compliant with her anastrozole and calcium and vitamin D supplementation and tolerates without issue.  She is up-to-date on both her DEXA scan and her mammogram.  She is compliant with monthly self breast exam and has no new findings or concerns.        REVIEW OF SYSTEMS:     Review of Systems   Constitutional:  Negative for chills and fever.   HENT:  Negative for ear pain, mouth sores, nosebleeds and sore throat.    Eyes:  Negative for photophobia and visual disturbance.   Respiratory:  Negative for wheezing and stridor.    Cardiovascular:  Negative for chest pain and palpitations.   Gastrointestinal:  Negative for abdominal pain, diarrhea, nausea and vomiting.   Endocrine: Negative for cold intolerance and heat intolerance.   Genitourinary:  Negative for dysuria and hematuria.   Musculoskeletal:  Negative for joint swelling and neck stiffness.   Skin:  Negative for color change and rash.   Neurological:  Negative for seizures and syncope.   Hematological:  Negative for adenopathy.        No obvious bleeding   Psychiatric/Behavioral:  Negative for agitation, confusion and hallucinations.        OBJECTIVE:    Vitals:    12/13/24 1009   BP: 128/83   Pulse: 80   Temp: 98 °F (36.7 °C)   SpO2: 96%   Weight: 80.3 kg (177 lb)   Height: 162.6 cm (64.02\")   PainSc: 0-No pain "       ECOG    (0) Fully active, able to carry on all predisease performance without restriction    Physical Exam   Constitutional: She is oriented to person, place, and time. No distress.   HENT:   Head: Normocephalic and atraumatic.   Eyes: Conjunctivae are normal. Right eye exhibits no discharge. Left eye exhibits no discharge. No scleral icterus.   Neck: No thyromegaly present.   Cardiovascular: Normal rate, regular rhythm and normal heart sounds. Exam reveals no gallop and no friction rub.   Pulmonary/Chest: Effort normal. No stridor. No respiratory distress. She has no wheezes. Right breast exhibits no inverted nipple, no mass, no nipple discharge, no skin change and no tenderness. Left breast exhibits no inverted nipple, no mass, no nipple discharge, no skin change and no tenderness. No breast bleeding. Breasts are symmetrical.   Bilateral breast exam was negative  Bilateral axillary exam was negative   Abdominal: Soft. Bowel sounds are normal. She exhibits no mass. There is no abdominal tenderness. There is no rebound and no guarding.   Genitourinary: No breast bleeding.   Musculoskeletal: Normal range of motion. No tenderness.   Lymphadenopathy:     She has no cervical adenopathy.   Neurological: She is alert and oriented to person, place, and time. She exhibits normal muscle tone.   Skin: Skin is warm. No rash noted. She is not diaphoretic. No erythema.   Psychiatric: Her behavior is normal.   Nursing note and vitals reviewed.      I have reexamined the patient and the results are consistent with the previously documented exam.  RECENT LABS    WBC   Date Value Ref Range Status   12/13/2024 7.06 3.40 - 10.80 10*3/mm3 Final   01/02/2024 8.5 3.4 - 10.8 x10E3/uL Final     RBC   Date Value Ref Range Status   12/13/2024 4.69 3.77 - 5.28 10*6/mm3 Final   01/02/2024 4.53 3.77 - 5.28 x10E6/uL Final     Hemoglobin   Date Value Ref Range Status   12/13/2024 13.8 12.0 - 15.9 g/dL Final     Hematocrit   Date Value Ref  Range Status   12/13/2024 43.4 34.0 - 46.6 % Final     MCV   Date Value Ref Range Status   12/13/2024 92.5 79.0 - 97.0 fL Final     MCH   Date Value Ref Range Status   12/13/2024 29.4 26.6 - 33.0 pg Final     MCHC   Date Value Ref Range Status   12/13/2024 31.8 31.5 - 35.7 g/dL Final     RDW   Date Value Ref Range Status   12/13/2024 13.3 12.3 - 15.4 % Final     RDW-SD   Date Value Ref Range Status   12/13/2024 43.7 37.0 - 54.0 fl Final     MPV   Date Value Ref Range Status   12/13/2024 9.6 6.0 - 12.0 fL Final     Platelets   Date Value Ref Range Status   12/13/2024 312 140 - 450 10*3/mm3 Final     Neutrophil %   Date Value Ref Range Status   12/13/2024 59.1 42.7 - 76.0 % Final     Lymphocyte %   Date Value Ref Range Status   12/13/2024 28.5 19.6 - 45.3 % Final     Monocyte %   Date Value Ref Range Status   12/13/2024 9.6 5.0 - 12.0 % Final     Eosinophil %   Date Value Ref Range Status   12/13/2024 2.4 0.3 - 6.2 % Final     Basophil %   Date Value Ref Range Status   12/13/2024 0.4 0.0 - 1.5 % Final     Neutrophils, Absolute   Date Value Ref Range Status   12/13/2024 4.17 1.70 - 7.00 10*3/mm3 Final     Lymphocytes, Absolute   Date Value Ref Range Status   12/13/2024 2.01 0.70 - 3.10 10*3/mm3 Final     Monocytes, Absolute   Date Value Ref Range Status   12/13/2024 0.68 0.10 - 0.90 10*3/mm3 Final     Eosinophils, Absolute   Date Value Ref Range Status   12/13/2024 0.17 0.00 - 0.40 10*3/mm3 Final     Basophils, Absolute   Date Value Ref Range Status   12/13/2024 0.03 0.00 - 0.20 10*3/mm3 Final       Lab Results   Component Value Date    GLUCOSE 84 06/07/2024    BUN 14 06/07/2024    CREATININE 0.74 06/07/2024    EGFRIFNONA 82 01/27/2022    EGFRIFAFRI 95 01/27/2022    BCR 18.9 06/07/2024    K 4.2 06/07/2024    CO2 26.8 06/07/2024    CALCIUM 10.2 06/07/2024    PROTENTOTREF 7.0 01/02/2024    ALBUMIN 4.3 06/07/2024    LABIL2 1.7 01/02/2024    AST 20 06/07/2024    ALT 19 06/07/2024         ASSESSMENT:    Invasive ductal  carcinoma involving the left breast, status post left excisional biopsy with sentinel lymph node biopsy, ER positive, WI positive, HER-2/jamir negative for T1c N0 M0.  Ongoing surveillance  She has completed accelerated partial breast radiation under the care of Dr. Pickett.  Low recurrence score on Oncotype DX assay.     Family history of three relatives with breast cancer including patient, worrisome for hereditary breast cancer syndrome.   Negative for deleterious mutation, but patient was found to have variant of unknown significance in the APC and BARD-1 genes.  Status post EGD which showed erosive gastritis, Manuel erosions and duodenal ulcer in February 2016.  Status post colonoscopy which showed tubular adenoma in February 2016.  Scheduled for repeat EGD colonoscopy January 2021 by Dr. Daniels.  Hemoglobin normal at 13.8 g/dL.  Long history of hematuria.  Follows  up with urology  Status post Tamoxifen 12/22/14 to 12/13/17.  Arimidex initiated 12/13/17.  Monitoring for side effects.  Patient will continue Arimidex till December 2025.  Reviewed  Recurrent anemia due to iron deficiency: Has resolved  Assessment has been reviewed and updated      PLANS:    CBC today reviewed with the patient, normal    CMP ordered and pending    Last bone density was completed June 2024 was normal.  Next bone density will be due 6/2/2026    Continue Arimidex and Os-Brenden D.  Reviewed    Her bilateral diagnostic mammogram will be due June 2025.  Ordered today.  Reviewed her last mammogram    Continue monthly breast self-exam and call for lumps, nipple discharge, skin discoloration or breast pain.  She will continue Arimidex and Os-Brenden D.  Reviewed    Follow-up in 6 months with Dr. Bautista or sooner if needed     Patient verbalized understanding and is in agreement of the above plan.

## 2024-12-13 ENCOUNTER — LAB (OUTPATIENT)
Dept: LAB | Facility: HOSPITAL | Age: 59
End: 2024-12-13
Payer: COMMERCIAL

## 2024-12-13 ENCOUNTER — OFFICE VISIT (OUTPATIENT)
Dept: ONCOLOGY | Facility: CLINIC | Age: 59
End: 2024-12-13
Payer: COMMERCIAL

## 2024-12-13 VITALS
OXYGEN SATURATION: 96 % | WEIGHT: 177 LBS | HEART RATE: 80 BPM | DIASTOLIC BLOOD PRESSURE: 83 MMHG | SYSTOLIC BLOOD PRESSURE: 128 MMHG | BODY MASS INDEX: 30.22 KG/M2 | TEMPERATURE: 98 F | HEIGHT: 64 IN

## 2024-12-13 DIAGNOSIS — C50.919 MALIGNANT NEOPLASM OF BREAST IN FEMALE, ESTROGEN RECEPTOR POSITIVE, UNSPECIFIED LATERALITY, UNSPECIFIED SITE OF BREAST: Primary | ICD-10-CM

## 2024-12-13 DIAGNOSIS — Z17.0 MALIGNANT NEOPLASM OF BREAST IN FEMALE, ESTROGEN RECEPTOR POSITIVE, UNSPECIFIED LATERALITY, UNSPECIFIED SITE OF BREAST: Primary | ICD-10-CM

## 2024-12-13 LAB
ALBUMIN SERPL-MCNC: 4.6 G/DL (ref 3.5–5.2)
ALBUMIN/GLOB SERPL: 1.4 G/DL
ALP SERPL-CCNC: 112 U/L (ref 39–117)
ALT SERPL W P-5'-P-CCNC: 19 U/L (ref 1–33)
ANION GAP SERPL CALCULATED.3IONS-SCNC: 10.6 MMOL/L (ref 5–15)
AST SERPL-CCNC: 24 U/L (ref 1–32)
BASOPHILS # BLD AUTO: 0.03 10*3/MM3 (ref 0–0.2)
BASOPHILS NFR BLD AUTO: 0.4 % (ref 0–1.5)
BILIRUB SERPL-MCNC: 0.6 MG/DL (ref 0–1.2)
BUN SERPL-MCNC: 19 MG/DL (ref 8–23)
BUN/CREAT SERPL: 22.9 (ref 7–25)
CALCIUM SPEC-SCNC: 10.4 MG/DL (ref 8.6–10.5)
CHLORIDE SERPL-SCNC: 104 MMOL/L (ref 98–107)
CO2 SERPL-SCNC: 25.4 MMOL/L (ref 22–29)
CREAT SERPL-MCNC: 0.83 MG/DL (ref 0.57–1)
DEPRECATED RDW RBC AUTO: 43.7 FL (ref 37–54)
EGFRCR SERPLBLD CKD-EPI 2021: 80.8 ML/MIN/1.73
EOSINOPHIL # BLD AUTO: 0.17 10*3/MM3 (ref 0–0.4)
EOSINOPHIL NFR BLD AUTO: 2.4 % (ref 0.3–6.2)
ERYTHROCYTE [DISTWIDTH] IN BLOOD BY AUTOMATED COUNT: 13.3 % (ref 12.3–15.4)
GLOBULIN UR ELPH-MCNC: 3.3 GM/DL
GLUCOSE SERPL-MCNC: 88 MG/DL (ref 65–99)
HCT VFR BLD AUTO: 43.4 % (ref 34–46.6)
HGB BLD-MCNC: 13.8 G/DL (ref 12–15.9)
HOLD SPECIMEN: NORMAL
LYMPHOCYTES # BLD AUTO: 2.01 10*3/MM3 (ref 0.7–3.1)
LYMPHOCYTES NFR BLD AUTO: 28.5 % (ref 19.6–45.3)
MCH RBC QN AUTO: 29.4 PG (ref 26.6–33)
MCHC RBC AUTO-ENTMCNC: 31.8 G/DL (ref 31.5–35.7)
MCV RBC AUTO: 92.5 FL (ref 79–97)
MONOCYTES # BLD AUTO: 0.68 10*3/MM3 (ref 0.1–0.9)
MONOCYTES NFR BLD AUTO: 9.6 % (ref 5–12)
NEUTROPHILS NFR BLD AUTO: 4.17 10*3/MM3 (ref 1.7–7)
NEUTROPHILS NFR BLD AUTO: 59.1 % (ref 42.7–76)
PLATELET # BLD AUTO: 312 10*3/MM3 (ref 140–450)
PMV BLD AUTO: 9.6 FL (ref 6–12)
POTASSIUM SERPL-SCNC: 4.4 MMOL/L (ref 3.5–5.2)
PROT SERPL-MCNC: 7.9 G/DL (ref 6–8.5)
RBC # BLD AUTO: 4.69 10*6/MM3 (ref 3.77–5.28)
SODIUM SERPL-SCNC: 140 MMOL/L (ref 136–145)
WBC NRBC COR # BLD AUTO: 7.06 10*3/MM3 (ref 3.4–10.8)

## 2024-12-13 PROCEDURE — 85025 COMPLETE CBC W/AUTO DIFF WBC: CPT

## 2024-12-13 PROCEDURE — 36415 COLL VENOUS BLD VENIPUNCTURE: CPT

## 2024-12-13 PROCEDURE — 80053 COMPREHEN METABOLIC PANEL: CPT | Performed by: NURSE PRACTITIONER

## 2025-01-02 ENCOUNTER — OFFICE VISIT (OUTPATIENT)
Dept: FAMILY MEDICINE CLINIC | Facility: CLINIC | Age: 60
End: 2025-01-02
Payer: COMMERCIAL

## 2025-01-02 VITALS
BODY MASS INDEX: 30.19 KG/M2 | HEIGHT: 64 IN | OXYGEN SATURATION: 96 % | DIASTOLIC BLOOD PRESSURE: 91 MMHG | HEART RATE: 78 BPM | RESPIRATION RATE: 12 BRPM | SYSTOLIC BLOOD PRESSURE: 150 MMHG | WEIGHT: 176.8 LBS | TEMPERATURE: 97.6 F

## 2025-01-02 DIAGNOSIS — K21.9 GASTROESOPHAGEAL REFLUX DISEASE WITHOUT ESOPHAGITIS: ICD-10-CM

## 2025-01-02 DIAGNOSIS — I10 PRIMARY HYPERTENSION: ICD-10-CM

## 2025-01-02 DIAGNOSIS — J30.1 NON-SEASONAL ALLERGIC RHINITIS DUE TO POLLEN: ICD-10-CM

## 2025-01-02 DIAGNOSIS — E04.2 MULTINODULAR THYROID: ICD-10-CM

## 2025-01-02 DIAGNOSIS — Z12.4 CERVICAL CANCER SCREENING: ICD-10-CM

## 2025-01-02 DIAGNOSIS — Z85.3 HISTORY OF BREAST CANCER: ICD-10-CM

## 2025-01-02 DIAGNOSIS — Z00.00 WELLNESS EXAMINATION: Primary | ICD-10-CM

## 2025-01-02 DIAGNOSIS — E66.09 CLASS 1 OBESITY DUE TO EXCESS CALORIES WITH SERIOUS COMORBIDITY AND BODY MASS INDEX (BMI) OF 30.0 TO 30.9 IN ADULT: ICD-10-CM

## 2025-01-02 DIAGNOSIS — R73.03 PREDIABETES: ICD-10-CM

## 2025-01-02 DIAGNOSIS — E66.811 CLASS 1 OBESITY DUE TO EXCESS CALORIES WITH SERIOUS COMORBIDITY AND BODY MASS INDEX (BMI) OF 30.0 TO 30.9 IN ADULT: ICD-10-CM

## 2025-01-02 PROCEDURE — 99214 OFFICE O/P EST MOD 30 MIN: CPT | Performed by: NURSE PRACTITIONER

## 2025-01-02 PROCEDURE — 99396 PREV VISIT EST AGE 40-64: CPT | Performed by: NURSE PRACTITIONER

## 2025-01-02 NOTE — PROGRESS NOTES
Chief Complaint  Annual Exam, Hypertension, and Prediabetes    Subjective          Lisseth is a 59 y.o. female  who presents to [facility]     Patient Care Team:  Susanne Avelar APRN as PCP - General (Family Medicine)  Courtney Bautista MD as Consulting Physician (Hematology and Oncology)  Zeke Daniels MD as Consulting Physician (Gastroenterology)     History of Present Illness  Adult Annual Wellness    Social History    Tobacco Use      Smoking status: Never      Smokeless tobacco: Never    Vaping Use      Vaping status: Never Used    Alcohol use: No    Drug use: Never     General health habits  Last eye exam - December 2024, wears readers  Last dental exam -  Summer 2024    Diet - Regular diet    Weight / BMI - obese, BMI 30.3    Exercise - no    Hours of sleep per night ? 8    Safety -   Do you use seat belts consistently ? Yes   Working smoke detector in home ? Yes   Do you use sunscreen when outdoors ? Yes        Reproductive health -  Sexually active ? Yes   Birth control - menopause  History of abnormal pap smear ? No     Screening/prevention  Last mammogram/ breast cancer screening - diagnostic mammogram bilateral on 6/6/2024  Last pap smear/ cervical cancer screening - 12/22/21 NILM  Last DEXA scan - 2/12/2024 - Normal Bone Mineral Density  Colon cancer screening - colonoscopy 1/6/2021; repeat 5 years (due 2026)  CT low dose lung cancer screening - never smoker    Immunizations -   Tdap - 11/1/2018  Pneumococcal vaccine - yes PCV13 and PPSV23  Shingles (Shingrix) - yes    ++++++++++++++++++++++++    GYN Annual Exam     Periods are absent.     Current contraception: post menopausal status  Last Pap : Pap smear 12/22/2021 NILM   History of abnormal Pap smear: no    Family history of uterine cancer: no  Family history of colon cancer: no  Family history of ovarian cancer: no    Last mammogram: 6/6/2024  History of abnormal mammogram: yes - hx breast  cancer  Family history of breast cancer: yes - cousin, maternal  aunt     ++++++++++++++++++++++++    Patient presents for follow-up of HTN  This is an established problem.  Interim treatment changes: stopped losartan in October 2023 due to hypotension  Current medications: none  Checks blood pressure at home ? Yes  Reports Systolic runs 120-130 and diastolic in the 80's    ++++++++++++++++++++++++    Patient presents for follow-up of prediabetes  This is an established problem  Interim treatment changes: making lifestyle changes  Current medications: none  Hgba1c 5.8 % on 1/2/2024    ++++++++++++++++++++++++    Patient presents for follow-up of multinodular thyroid  This is an established problem.  Interim treatment changes: none  Current medications: none  Last thyroid US 5/13/2024  Last TSH 6/20/2024  Saw endocrinology, Dr. Pierre, on 6/28/2024.   Endocrinology recommends clinical monitoring once a year with TSH and free T4 levels (due June 2025)    ++++++++++++++++++++++++    Hx breast cancer.  Takes anastrozole 1 mg daily  Sees oncology.  Saw Princess Crane NP on 12/13/2024 and will follow up with Dr Bautista in June 2025    ++++++++++++++++++++++++    Patient presents for follow-up of GERD  This is an established problem  Interim treatment changes: none  Current medications: pantoprazole 40 mg daily  EGD 1/6/2021 = hiatal hernia  She tried to take pantoprazole every other day but she had abdominal pain when she did not take it routinely.    Lisseth Linder  has a past medical history of Adenomatous colon polyp, Allergic rhinitis, Asthma, Breast cancer, Cholelithiasis, Constipation, Duodenal ulcer, Erosive gastritis, GERD (gastroesophageal reflux disease), Hiatal hernia, History of breast cancer, Hypothyroidism, Iron deficiency anemia, Microscopic hematuria, Prediabetes (01/02/2024), Primary hypertension (12/22/2021), SELMA (stress urinary incontinence, female), and Thyroid nodule.      Review of Systems    Constitutional:  Negative for fatigue and fever.   HENT:  Negative for ear pain, sore throat and trouble swallowing.    Eyes:  Negative for visual disturbance.   Respiratory:  Negative for cough and shortness of breath.    Cardiovascular:  Negative for chest pain and leg swelling.   Gastrointestinal:  Negative for abdominal pain, blood in stool, constipation, diarrhea, nausea and vomiting.        + GERD   Genitourinary:  Negative for dyspareunia, dysuria and hematuria.   Allergic/Immunologic: Positive for environmental allergies.   Psychiatric/Behavioral:  Negative for dysphoric mood and sleep disturbance. The patient is not nervous/anxious.         Family History   Problem Relation Age of Onset    Diabetes Mother     Hypertension Mother     Hyperlipidemia Mother     High cholesterol Mother     Kidney cancer Father     Prostate cancer Father     Cancer Father     Hypertension Father     Breast cancer Maternal Aunt     Prostate cancer Paternal Aunt     Lung cancer Paternal Uncle     Breast cancer Cousin     Heart disease Other         Grandparents        Past Surgical History:   Procedure Laterality Date    AUGMENTATION MAMMAPLASTY  1989    BREAST LUMPECTOMY Left 2014    BREAST RECONSTRUCTION Left 2014    COLONOSCOPY  01/06/2021    normal; repeat 5 years; Dr. Daniels    ENDOSCOPY  01/06/2021    Large 8 cm hiatal hernia; Dr. Daniels    SENTINEL LYMPH NODE BIOPSY          Social History     Socioeconomic History    Marital status:      Spouse name: Edu Wheeler    Number of children: 2    Highest education level: High school graduate   Tobacco Use    Smoking status: Never    Smokeless tobacco: Never   Vaping Use    Vaping status: Never Used   Substance and Sexual Activity    Alcohol use: No    Drug use: Never    Sexual activity: Yes     Partners: Male     Birth control/protection: Post-menopausal        Immunization History   Administered Date(s) Administered    ABRYSVO (RSV, 60+ or pregnant women 32-36 wks)  2024    COVID-19 (MODERNA) 12YRS+ (SPIKEVAX) 2023, 04/10/2024    COVID-19 (MODERNA) 1st,2nd,3rd Dose Monovalent 2021, 2021, 2021, 2022    COVID-19 (PFIZER) 12YRS+ (COMIRNATY) 2024    COVID-19 (PFIZER) BIVALENT 12+YRS 10/10/2022    Flu Vaccine Quad PF 6-35MO 10/13/2015, 2016, 2017, 10/22/2019, 2021    Fluzone  >6mos 2024    Fluzone (or Fluarix & Flulaval for VFC) >6mos 10/22/2019, 2020    Fluzone Quad >6mos (Multi-dose) 2021    H1N1 Inj 2009    Hepatitis A 2018, 10/24/2018    Influenza Inj MDCK Preserative Free 10/09/2018    Influenza Injectable Mdck Pf Quad 2023    Influenza MDCK Quadrivalent with Preserative 10/10/2022    Influenza Seasonal Injectable 2010, 2011, 2012    Pneumococcal Conjugate 13-Valent (PCV13) 2016    Pneumococcal Polysaccharide (PPSV23) 2014    Shingrix 2019, 2020, 04/10/2024, 2024    Tdap 2010, 2018    Zostavax 2016       Menstrual History:  OB History          2    Para   2    Term   2            AB        Living   2         SAB        IAB        Ectopic        Molar        Multiple        Live Births   2          Obstetric Comments   Menarche 10  Menopause 49      Pap smear 2021 NILM                Patient's last menstrual period was 2015.        Objective       Current Outpatient Medications:     albuterol sulfate  (90 Base) MCG/ACT inhaler, Inhale 2 puffs., Disp: , Rfl:     anastrozole (ARIMIDEX) 1 MG tablet, TAKE 1 TABLET DAILY, Disp: 90 tablet, Rfl: 3    Calcium Carbonate-Vitamin D 600-200 MG-UNIT tablet, 2 (Two) Times a Day., Disp: , Rfl:     cetirizine (zyrTEC) 10 MG tablet, TAKE 1 TABLET BY MOUTH DAILY AT BEDTIME FOR ALLERGIES., Disp: 90 tablet, Rfl: 0    fluticasone (FLONASE) 50 MCG/ACT nasal spray, 2 sprays in each nostril once daily for allergies, Disp: 15.8 mL, Rfl: 11    Multiple Vitamin  "(MULTI-VITAMIN) tablet, MULTI-VITAMIN TABS, Disp: , Rfl:     pantoprazole (PROTONIX) 40 MG EC tablet, TAKE 1 TABLET DAILY, Disp: 90 tablet, Rfl: 3    Vital Signs:      /91 (BP Location: Left arm, Patient Position: Sitting, Cuff Size: Adult)   Pulse 78   Temp 97.6 °F (36.4 °C) (Infrared)   Resp 12   Ht 162.6 cm (64.02\")   Wt 80.2 kg (176 lb 12.8 oz)   LMP 02/12/2015   SpO2 96%   BMI 30.33 kg/m²     Vitals:    01/02/25 1003   BP: 150/91   BP Location: Left arm   Patient Position: Sitting   Cuff Size: Adult   Pulse: 78   Resp: 12   Temp: 97.6 °F (36.4 °C)   TempSrc: Infrared   SpO2: 96%   Weight: 80.2 kg (176 lb 12.8 oz)   Height: 162.6 cm (64.02\")      Physical Exam  Vitals reviewed.   Constitutional:       General: She is not in acute distress.     Appearance: Normal appearance. She is obese.   HENT:      Head: Normocephalic and atraumatic.      Right Ear: Tympanic membrane, ear canal and external ear normal.      Left Ear: Tympanic membrane, ear canal and external ear normal.      Mouth/Throat:      Mouth: Mucous membranes are moist.      Pharynx: Oropharynx is clear. No oropharyngeal exudate.   Eyes:      General: No scleral icterus.     Conjunctiva/sclera: Conjunctivae normal.   Neck:      Thyroid: No thyromegaly.   Cardiovascular:      Rate and Rhythm: Normal rate and regular rhythm.      Heart sounds: Normal heart sounds.   Pulmonary:      Effort: Pulmonary effort is normal. No respiratory distress.      Breath sounds: Normal breath sounds.   Abdominal:      General: Bowel sounds are normal. There is no distension.      Palpations: Abdomen is soft. There is no mass.      Tenderness: There is no abdominal tenderness. There is no right CVA tenderness, left CVA tenderness, guarding or rebound.   Genitourinary:     General: Normal vulva.      Exam position: Lithotomy position.      Pubic Area: No rash.       Labia:         Right: No rash, tenderness or lesion.         Left: No rash, tenderness or " lesion.       Urethra: No prolapse.      Vagina: No vaginal discharge, erythema, tenderness or lesions.      Cervix: No cervical motion tenderness, discharge, lesion or erythema.      Uterus: Not enlarged, not fixed, not tender and no uterine prolapse.       Adnexa:         Right: No mass, tenderness or fullness.          Left: No mass, tenderness or fullness.        Comments: Specimen obtained for pap smear  Musculoskeletal:      Cervical back: Neck supple.      Right lower leg: No edema.      Left lower leg: No edema.   Lymphadenopathy:      Cervical: No cervical adenopathy.   Skin:     General: Skin is warm and dry.   Neurological:      Mental Status: She is alert and oriented to person, place, and time.   Psychiatric:         Mood and Affect: Mood normal.         Behavior: Behavior normal.        Result Review :   The following data was reviewed by: LELA Grande on 01/02/2025:  Common labs          6/7/2024    11:01 12/13/2024    10:06   Common Labs   Glucose 84  88    BUN 14  19    Creatinine 0.74  0.83    Sodium 139  140    Potassium 4.2  4.4    Chloride 102  104    Calcium 10.2  10.4    Albumin 4.3  4.6    Total Bilirubin 0.5  0.6    Alkaline Phosphatase 125  112    AST (SGOT) 20  24    ALT (SGPT) 19  19    WBC 7.89  7.06    Hemoglobin 13.7  13.8    Hematocrit 41.3  43.4    Platelets 312  312           Assessment and Plan    Diagnoses and all orders for this visit:    1. Wellness examination (Primary)  Assessment & Plan:  Discussed preventative healthcare.  Labs ordered. Recommended annual eye and dental exams. Recommended use of seatbelts, sunscreen and smoke detectors in the home.  Tdap up to date. Cervical cancer screening (pap smear) completed today. Breast cancer screening (mammogram) up to date.  Colon cancer screening up to date.  Repeat colonoscopy due in 1 year (January 2026).     Orders:  -     Lipid Panel    2. Primary hypertension  Assessment & Plan:  Chronic problem.  Blood pressure  elevated today but she reports home readings to goal.    Discussed goal systolic blood pressure is less than 130 and goal diastolic is less than 80.  Discussed non-pharmacologic treatment options to lower blood pressure: low-sodium diet, weight loss, exercise 10 to 15 minutes/day and consumption of alcohol in moderation.    Continue to monitor blood pressure at home and bring a copy of home readings to next appointment.      3. Prediabetes  Overview:  Hgba1c  5.8 % on 1/2/2024    Assessment & Plan:  Chronic problem.  Check hgba1c    Orders:  -     Hemoglobin A1c    4. Multinodular thyroid  Overview:  thyroid US 5/13/2024    Assessment & Plan:  Chronic problem  She has seen endocrinology.  Consider repeat thyroid US in 2 years    Orders:  -     T4, Free  -     TSH    5. History of breast cancer  Assessment & Plan:  Follow-up with oncology, Dr. Bautista, as scheduled.  Mammogram is due in June 2025      6. Gastroesophageal reflux disease without esophagitis  Overview:  EGD 1/6/2021 = hiatal hernia    Assessment & Plan:  Chronic stable problem.  Continue pantoprazole 40 mg daily        7. Non-seasonal allergic rhinitis due to pollen  Overview:  She has seen an allergist in the past and took allergy shots in the past.    Assessment & Plan:  Chronic stable problem.  Continue Zyrtec 10 mg daily and Flonase nasal spray 2 sprays each nostril once daily      8. Cervical cancer screening  Comments:  pap smear completed today  Orders:  -     IGP,Aptima HPV,Age Gdln    9. Class 1 obesity due to excess calories with serious comorbidity and body mass index (BMI) of 30.0 to 30.9 in adult  Assessment & Plan:  Patient's (Body mass index is 30.33 kg/m².) indicates that they are obese (BMI >30) with health conditions that include hypertension . Weight is  stable . BMI  is above average; BMI management plan is completed. We discussed low calorie, low carb based diet program, portion control, and increasing exercise.            Follow Up    Return in about 1 year (around 1/2/2026) for Annual physical.  Patient was given instructions and counseling regarding her condition or for health maintenance advice. Please see specific information pulled into the AVS if appropriate.    Patient Instructions   Go to [facility] (across the [address] from office- [address]) for bloodwork.

## 2025-01-03 LAB
CHOLEST SERPL-MCNC: 153 MG/DL (ref 100–199)
HBA1C MFR BLD: 5.7 % (ref 4.8–5.6)
HDLC SERPL-MCNC: 65 MG/DL
LDLC SERPL CALC-MCNC: 72 MG/DL (ref 0–99)
T4 FREE SERPL-MCNC: 1.66 NG/DL (ref 0.82–1.77)
TRIGL SERPL-MCNC: 87 MG/DL (ref 0–149)
TSH SERPL DL<=0.005 MIU/L-ACNC: 0.21 UIU/ML (ref 0.45–4.5)
VLDLC SERPL CALC-MCNC: 16 MG/DL (ref 5–40)

## 2025-01-03 NOTE — ASSESSMENT & PLAN NOTE
Discussed preventative healthcare.  Labs ordered. Recommended annual eye and dental exams. Recommended use of seatbelts, sunscreen and smoke detectors in the home.  Tdap up to date. Cervical cancer screening (pap smear) completed today. Breast cancer screening (mammogram) up to date.  Colon cancer screening up to date.  Repeat colonoscopy due in 1 year (January 2026).

## 2025-01-03 NOTE — ASSESSMENT & PLAN NOTE
Patient's (Body mass index is 30.33 kg/m².) indicates that they are obese (BMI >30) with health conditions that include hypertension . Weight is  stable . BMI  is above average; BMI management plan is completed. We discussed low calorie, low carb based diet program, portion control, and increasing exercise.

## 2025-01-03 NOTE — ASSESSMENT & PLAN NOTE
Chronic problem.  Blood pressure elevated today but she reports home readings to goal.    Discussed goal systolic blood pressure is less than 130 and goal diastolic is less than 80.  Discussed non-pharmacologic treatment options to lower blood pressure: low-sodium diet, weight loss, exercise 10 to 15 minutes/day and consumption of alcohol in moderation.    Continue to monitor blood pressure at home and bring a copy of home readings to next appointment.

## 2025-01-04 LAB
AGE GDLN ACOG TESTING: NORMAL
CYTOLOGIST CVX/VAG CYTO: NORMAL
CYTOLOGY CVX/VAG DOC CYTO: NORMAL
CYTOLOGY CVX/VAG DOC THIN PREP: NORMAL
DX ICD CODE: NORMAL
HPV GENOTYPE REFLEX: NORMAL
HPV I/H RISK 4 DNA CVX QL PROBE+SIG AMP: NEGATIVE
Lab: NORMAL
OTHER STN SPEC: NORMAL
STAT OF ADQ CVX/VAG CYTO-IMP: NORMAL

## 2025-01-04 NOTE — PROGRESS NOTES
Let her know:    1. Hgba1c is 5.7 % = prediabetes.  This is slightly better than last year (it was 5.8 % last year).  Continue to make lifestyle changes: low carbohydrate diet, no concentrated sweets, exercise and weight reduction.  Recheck in 6-12 months.     2. TSH and free T4 - thyroid function is stable.  Recheck 1 year.    3. Lipid panel - lipid panel (cholesterol) is normal.

## 2025-01-30 DIAGNOSIS — J30.1 NON-SEASONAL ALLERGIC RHINITIS DUE TO POLLEN: ICD-10-CM

## 2025-01-30 RX ORDER — CETIRIZINE HYDROCHLORIDE 10 MG/1
10 TABLET ORAL DAILY
Qty: 90 TABLET | Refills: 3 | Status: SHIPPED | OUTPATIENT
Start: 2025-01-30

## 2025-03-04 DIAGNOSIS — C50.919 MALIGNANT NEOPLASM OF BREAST IN FEMALE, ESTROGEN RECEPTOR POSITIVE, UNSPECIFIED LATERALITY, UNSPECIFIED SITE OF BREAST: ICD-10-CM

## 2025-03-04 DIAGNOSIS — Z17.0 MALIGNANT NEOPLASM OF BREAST IN FEMALE, ESTROGEN RECEPTOR POSITIVE, UNSPECIFIED LATERALITY, UNSPECIFIED SITE OF BREAST: ICD-10-CM

## 2025-03-04 RX ORDER — ANASTROZOLE 1 MG/1
TABLET ORAL
Qty: 90 TABLET | Refills: 3 | Status: SHIPPED | OUTPATIENT
Start: 2025-03-04

## 2025-06-13 NOTE — PROGRESS NOTES
Hematology/Oncology Outpatient Follow Up    PATIENT NAME:Lisseth Linder  :1965  MRN: [id number]  PRIMARY CARE PHYSICIAN: Susanne Avelar APRN  REFERRING PHYSICIAN: No ref. provider found    Chief Complaint   Patient presents with    Follow-up     Malignant neoplasm of breast in female, estrogen receptor positive, unspecified laterality, unspecified site of breast            HISTORY OF PRESENT ILLNESS:     This is a 58-year-old female who was originally seen on 10/6/14.  Patient’s original screening mammogram, not available.   14, she underwent needle localized excisional biopsy of the left breast mass and sentinel lymph node biopsy.  Tumor measured 1.1 cm, negative surgical margins.  ER positive, IA positive, HER-2/jamir negative.  14 - She had Oncotype DX assay performed on the tumor, which returned with a recurrent score of 10, which corresponds to 7% risk of distant recurrence following five years of Tamoxifen over a course of 10 years.  This places patient at low-risk disease.  ER was positive, IA positive and HER-2/jamir was negative on the validation assay.    14 - Patient completed accelerated breast radiation under the care of Dr. Pickett.  14 - Patient had a comprehensive genetic analysis with [facility].  This returned with no significant mutation identified, but she was found to have variant of unknown significance in the APC and BARD-1 genes.      14 - Patient initiated on Tamoxifen 20 mg daily.   6/8/15 - Bilateral digital diagnostic mammogram which showed fatty breasts and postsurgical changes on the left breast, otherwise negative.  Follow up in one year was recommended.  She also had an ultrasound of the left breast which did not show any abnormalities.   2/10/16 - EGD and colonoscopy were done.  On the EGD she was found to have erosive gastritis, Manuel erosions and a duodenal ulcer.  Colonoscopy showed tubular adenoma.   16 - Follow up mammogram was essentially  normal with stable post-op changes in the left breast.  Follow up in one year was recommended.    12/28/16 - Anemia work up revealed reticulocyte count (N), ferritin (L) 4, folate > 24, haptoglobin (N) 145, , iron binding capacity (H) 555.  SPEP with immunofixation assay did not reveal any monoclonal protein.  B12 (N) 353.  Methylmalonic acid level (N) 244.    1/11/16 - Urinalysis done showed small blood.  Urine culture did not grow any significant organisms.     6/16/17 - Bilateral mammogram with tomosynthesis was a stable exam without any abnormalities detected.    8/23/17 - Chemistry panel:  BUN 13, creatinine 0.7, liver function tests (N).  Estradiol level < 20.  FHS 31.5, also in the postmenopausal range.    December 2017 - Patient received Injectafer for iron deficiency anemia.  Received 750 mg Injectafer day 1 and day 8.  December 2017 - Ferritin 10.    12/13/17 - Tamoxifen discontinued.  Arimidex 1 mg daily and Os-Brenden D were initiated.    1/24/18 - DEXA scan, normal.    6/19/18 - Patient had bilateral diagnostic mammogram with tomosynthesis.  No mammographic evidence of malignancy.  Follow up in one year was recommended.    8/8/18 - Ferritin level was 227.  BUN 13.  Creatinine 0.6.   6/14/2019 patient had bilateral diagnostic mammogram with tomosynthesis there is no mammographic evidence of malignancy follow-up in 1 year was recommended.  2/4/2020-patient had a DEXA scan which showed a normal bone density  February 2020 patient had anemia work-up which showed persistent iron deficiency with a ferritin level of 4.  Patient was referred back to [facility] and is to receive  IV Injectafer but due to the pandemic she canceled her appointments  5/18/2020 patient had bilateral screening mammogram, there was no mammographic evidence of malignancy.  Follow-up in 1 year was recommended  2/12/2024: DEXA scan with normal bone density  6/6/2024: Bilateral diagnostic mammogram with no evidence of malignancy      Past  Medical History:   Diagnosis Date    Adenomatous colon polyp     Allergic rhinitis     Asthma     Breast cancer     Cholelithiasis     Constipation     Duodenal ulcer     Erosive gastritis     GERD (gastroesophageal reflux disease)     Hiatal hernia     History of breast cancer     Hypothyroidism     Iron deficiency anemia     Microscopic hematuria     Prediabetes 01/02/2024    Primary hypertension 12/22/2021    SELMA (stress urinary incontinence, female)     Thyroid nodule        Past Surgical History:   Procedure Laterality Date    AUGMENTATION MAMMAPLASTY  1989    BREAST LUMPECTOMY Left 2014    BREAST RECONSTRUCTION Left 2014    COLONOSCOPY  01/06/2021    normal; repeat 5 years; Dr. Daniels    ENDOSCOPY  01/06/2021    Large 8 cm hiatal hernia; Dr. Daniels    SENTINEL LYMPH NODE BIOPSY           Current Outpatient Medications:     albuterol sulfate  (90 Base) MCG/ACT inhaler, Inhale 2 puffs., Disp: , Rfl:     anastrozole (ARIMIDEX) 1 MG tablet, TAKE 1 TABLET DAILY, Disp: 90 tablet, Rfl: 3    Calcium Carbonate-Vitamin D 600-200 MG-UNIT tablet, 2 (Two) Times a Day., Disp: , Rfl:     cetirizine (zyrTEC) 10 MG tablet, TAKE 1 TABLET BY MOUTH DAILY AT BEDTIME FOR ALLERGIES., Disp: 90 tablet, Rfl: 3    fluticasone (FLONASE) 50 MCG/ACT nasal spray, 2 sprays in each nostril once daily for allergies, Disp: 15.8 mL, Rfl: 11    Multiple Vitamin (MULTI-VITAMIN) tablet, MULTI-VITAMIN TABS, Disp: , Rfl:     pantoprazole (PROTONIX) 40 MG EC tablet, TAKE 1 TABLET DAILY, Disp: 90 tablet, Rfl: 3    No Known Allergies    Family History   Problem Relation Age of Onset    Diabetes Mother     Hypertension Mother     Hyperlipidemia Mother     High cholesterol Mother     Kidney cancer Father     Prostate cancer Father     Cancer Father     Hypertension Father     Breast cancer Maternal Aunt     Prostate cancer Paternal Aunt     Lung cancer Paternal Uncle     Breast cancer Cousin     Heart disease Other         Grandparents  "      Cancer-related family history includes Breast cancer in her cousin and maternal aunt; Cancer in her father; Kidney cancer in her father; Lung cancer in her paternal uncle; Prostate cancer in her father and paternal aunt.    Social History     Tobacco Use    Smoking status: Never    Smokeless tobacco: Never   Vaping Use    Vaping status: Never Used   Substance Use Topics    Alcohol use: No    Drug use: Never       I have reviewed and confirmed the accuracy of the patient's history: Chief complaint, HPI, ROS, and Subjective as entered by the MA/LPN/RN. Courtney Kierra Bautista MD 06/16/25 6/16/25    SUBJECTIVE:    She denies any new issues today.  She is here today for routine follow-up.        REVIEW OF SYSTEMS:     Review of Systems   Constitutional:  Negative for chills and fever.   HENT:  Negative for ear pain, mouth sores, nosebleeds and sore throat.    Eyes:  Negative for photophobia and visual disturbance.   Respiratory:  Negative for wheezing and stridor.    Cardiovascular:  Negative for chest pain and palpitations.   Gastrointestinal:  Negative for abdominal pain, diarrhea, nausea and vomiting.   Endocrine: Negative for cold intolerance and heat intolerance.   Genitourinary:  Negative for dysuria and hematuria.   Musculoskeletal:  Negative for joint swelling and neck stiffness.   Skin:  Negative for color change and rash.   Neurological:  Negative for seizures and syncope.   Hematological:  Negative for adenopathy.        No obvious bleeding   Psychiatric/Behavioral:  Negative for agitation, confusion and hallucinations.        OBJECTIVE:    Vitals:    06/16/25 1126   BP: 136/84   Pulse: 79   Resp: 18   Temp: 98.1 °F (36.7 °C)   TempSrc: Temporal   SpO2: 96%   Weight: 78.9 kg (174 lb)   Height: 162.6 cm (64\")   PainSc: 0-No pain         ECOG    (0) Fully active, able to carry on all predisease performance without restriction    Physical Exam   Constitutional: She is oriented to person, place, and time. " No distress.   HENT:   Head: Normocephalic and atraumatic.   Eyes: Conjunctivae are normal. Right eye exhibits no discharge. Left eye exhibits no discharge. No scleral icterus.   Neck: No thyromegaly present.   Cardiovascular: Normal rate, regular rhythm and normal heart sounds. Exam reveals no gallop and no friction rub.   Pulmonary/Chest: Effort normal. No stridor. No respiratory distress. She has no wheezes. Right breast exhibits no inverted nipple, no mass, no nipple discharge, no skin change and no tenderness. Left breast exhibits no inverted nipple, no mass, no nipple discharge, no skin change and no tenderness. No breast bleeding. Breasts are symmetrical.   Bilateral breast exam was negative  Bilateral axillary exam was negative   Abdominal: Soft. Bowel sounds are normal. She exhibits no mass. There is no abdominal tenderness. There is no rebound and no guarding.   Genitourinary: No breast bleeding.   Musculoskeletal: Normal range of motion. No tenderness.   Lymphadenopathy:     She has no cervical adenopathy.   Neurological: She is alert and oriented to person, place, and time. She exhibits normal muscle tone.   Skin: Skin is warm. No rash noted. She is not diaphoretic. No erythema.   Psychiatric: Her behavior is normal.   Nursing note and vitals reviewed.    I have reexamined the patient and the results are consistent with the previously documented exam. Courtney Bautista MD  6/16/25      RECENT LABS    WBC   Date Value Ref Range Status   06/16/2025 7.91 3.40 - 10.80 10*3/mm3 Final   01/02/2024 8.5 3.4 - 10.8 x10E3/uL Final     RBC   Date Value Ref Range Status   06/16/2025 4.39 3.77 - 5.28 10*6/mm3 Final   01/02/2024 4.53 3.77 - 5.28 x10E6/uL Final     Hemoglobin   Date Value Ref Range Status   06/16/2025 13.0 12.0 - 15.9 g/dL Final     Hematocrit   Date Value Ref Range Status   06/16/2025 40.8 34.0 - 46.6 % Final     MCV   Date Value Ref Range Status   06/16/2025 92.9 79.0 - 97.0 fL Final     MCH    Date Value Ref Range Status   06/16/2025 29.6 26.6 - 33.0 pg Final     MCHC   Date Value Ref Range Status   06/16/2025 31.9 31.5 - 35.7 g/dL Final     RDW   Date Value Ref Range Status   06/16/2025 13.0 12.3 - 15.4 % Final     RDW-SD   Date Value Ref Range Status   06/16/2025 43.1 37.0 - 54.0 fl Final     MPV   Date Value Ref Range Status   06/16/2025 9.5 6.0 - 12.0 fL Final     Platelets   Date Value Ref Range Status   06/16/2025 319 140 - 450 10*3/mm3 Final     Neutrophil %   Date Value Ref Range Status   06/16/2025 59.2 42.7 - 76.0 % Final     Lymphocyte %   Date Value Ref Range Status   06/16/2025 28.8 19.6 - 45.3 % Final     Monocyte %   Date Value Ref Range Status   06/16/2025 9.0 5.0 - 12.0 % Final     Eosinophil %   Date Value Ref Range Status   06/16/2025 2.5 0.3 - 6.2 % Final     Basophil %   Date Value Ref Range Status   06/16/2025 0.5 0.0 - 1.5 % Final     Neutrophils, Absolute   Date Value Ref Range Status   06/16/2025 4.68 1.70 - 7.00 10*3/mm3 Final     Lymphocytes, Absolute   Date Value Ref Range Status   06/16/2025 2.28 0.70 - 3.10 10*3/mm3 Final     Monocytes, Absolute   Date Value Ref Range Status   06/16/2025 0.71 0.10 - 0.90 10*3/mm3 Final     Eosinophils, Absolute   Date Value Ref Range Status   06/16/2025 0.20 0.00 - 0.40 10*3/mm3 Final     Basophils, Absolute   Date Value Ref Range Status   06/16/2025 0.04 0.00 - 0.20 10*3/mm3 Final       Lab Results   Component Value Date    GLUCOSE 88 12/13/2024    BUN 19 12/13/2024    CREATININE 0.83 12/13/2024    EGFRIFNONA 82 01/27/2022    EGFRIFAFRI 95 01/27/2022    BCR 22.9 12/13/2024    K 4.4 12/13/2024    CO2 25.4 12/13/2024    CALCIUM 10.4 12/13/2024    ALBUMIN 4.6 12/13/2024    AST 24 12/13/2024    ALT 19 12/13/2024         ASSESSMENT:    Invasive ductal carcinoma involving the left breast, status post left excisional biopsy with sentinel lymph node biopsy, ER positive, OR positive, HER-2/jamir negative for T1c N0 M0.  Ongoing surveillance  She has  completed accelerated partial breast radiation under the care of Dr. Pickett.  Reviewed  Low recurrence score on Oncotype DX assay.     Family history of three relatives with breast cancer including patient, worrisome for hereditary breast cancer syndrome.   Negative for deleterious mutation, but patient was found to have variant of unknown significance in the APC and BARD-1 genes.  Status post EGD which showed erosive gastritis, Manuel erosions and duodenal ulcer in February 2016.  Status post colonoscopy which showed tubular adenoma in February 2016.  Scheduled for repeat EGD colonoscopy January 2021 by Dr. Daniels.  Hemoglobin normal at 13.8 g/dL.  Long history of hematuria.  Follows  up with urology  Status post Tamoxifen 12/22/14 to 12/13/17.  Arimidex initiated 12/13/17.  Monitoring for side effects.  Patient will continue Arimidex till December 2025.  Reviewed  Recurrent anemia due to iron deficiency: Has resolved  Assessment has been reviewed and updated      PLANS:    CBC reviewed    CMP today    Reviewed her mammogram    Follow-up 6 months  Last bone density was completed June 2024 was normal.  Next bone density will be due 6/2/2026    Continue Arimidex and Os-Brenden D.  Reviewed        Continue monthly breast self-exam and call for lumps, nipple discharge, skin discoloration or breast pain.  She will continue Arimidex and Os-Brenden D.  Reviewed         Patient verbalized understanding and is in agreement of the above plan.      Electronically signed by Courtney Bautista MD, 06/16/25, 4:52 PM EDT.

## 2025-06-15 LAB — NCCN CRITERIA FLAG: ABNORMAL

## 2025-06-16 ENCOUNTER — RESULTS FOLLOW-UP (OUTPATIENT)
Facility: HOSPITAL | Age: 60
End: 2025-06-16
Payer: COMMERCIAL

## 2025-06-16 ENCOUNTER — OFFICE VISIT (OUTPATIENT)
Dept: ONCOLOGY | Facility: CLINIC | Age: 60
End: 2025-06-16
Payer: COMMERCIAL

## 2025-06-16 ENCOUNTER — HOSPITAL ENCOUNTER (OUTPATIENT)
Dept: MAMMOGRAPHY | Facility: HOSPITAL | Age: 60
Discharge: HOME | End: 2025-06-16
Admitting: NURSE PRACTITIONER
Payer: COMMERCIAL

## 2025-06-16 ENCOUNTER — LAB (OUTPATIENT)
Dept: LAB | Facility: HOSPITAL | Age: 60
End: 2025-06-16
Payer: COMMERCIAL

## 2025-06-16 ENCOUNTER — DOCUMENTATION (OUTPATIENT)
Dept: GENETICS | Facility: HOSPITAL | Age: 60
End: 2025-06-16
Payer: COMMERCIAL

## 2025-06-16 VITALS
TEMPERATURE: 98.1 F | BODY MASS INDEX: 29.71 KG/M2 | SYSTOLIC BLOOD PRESSURE: 136 MMHG | HEART RATE: 79 BPM | OXYGEN SATURATION: 96 % | HEIGHT: 64 IN | DIASTOLIC BLOOD PRESSURE: 84 MMHG | WEIGHT: 174 LBS | RESPIRATION RATE: 18 BRPM

## 2025-06-16 DIAGNOSIS — C50.919 MALIGNANT NEOPLASM OF BREAST IN FEMALE, ESTROGEN RECEPTOR POSITIVE, UNSPECIFIED LATERALITY, UNSPECIFIED SITE OF BREAST: ICD-10-CM

## 2025-06-16 DIAGNOSIS — Z17.0 MALIGNANT NEOPLASM OF BREAST IN FEMALE, ESTROGEN RECEPTOR POSITIVE, UNSPECIFIED LATERALITY, UNSPECIFIED SITE OF BREAST: ICD-10-CM

## 2025-06-16 DIAGNOSIS — Z17.0 MALIGNANT NEOPLASM OF BREAST IN FEMALE, ESTROGEN RECEPTOR POSITIVE, UNSPECIFIED LATERALITY, UNSPECIFIED SITE OF BREAST: Primary | ICD-10-CM

## 2025-06-16 DIAGNOSIS — C50.919 MALIGNANT NEOPLASM OF BREAST IN FEMALE, ESTROGEN RECEPTOR POSITIVE, UNSPECIFIED LATERALITY, UNSPECIFIED SITE OF BREAST: Primary | ICD-10-CM

## 2025-06-16 LAB
BASOPHILS # BLD AUTO: 0.04 10*3/MM3 (ref 0–0.2)
BASOPHILS NFR BLD AUTO: 0.5 % (ref 0–1.5)
DEPRECATED RDW RBC AUTO: 43.1 FL (ref 37–54)
EOSINOPHIL # BLD AUTO: 0.2 10*3/MM3 (ref 0–0.4)
EOSINOPHIL NFR BLD AUTO: 2.5 % (ref 0.3–6.2)
ERYTHROCYTE [DISTWIDTH] IN BLOOD BY AUTOMATED COUNT: 13 % (ref 12.3–15.4)
HCT VFR BLD AUTO: 40.8 % (ref 34–46.6)
HGB BLD-MCNC: 13 G/DL (ref 12–15.9)
LYMPHOCYTES # BLD AUTO: 2.28 10*3/MM3 (ref 0.7–3.1)
LYMPHOCYTES NFR BLD AUTO: 28.8 % (ref 19.6–45.3)
MCH RBC QN AUTO: 29.6 PG (ref 26.6–33)
MCHC RBC AUTO-ENTMCNC: 31.9 G/DL (ref 31.5–35.7)
MCV RBC AUTO: 92.9 FL (ref 79–97)
MONOCYTES # BLD AUTO: 0.71 10*3/MM3 (ref 0.1–0.9)
MONOCYTES NFR BLD AUTO: 9 % (ref 5–12)
NEUTROPHILS NFR BLD AUTO: 4.68 10*3/MM3 (ref 1.7–7)
NEUTROPHILS NFR BLD AUTO: 59.2 % (ref 42.7–76)
PLATELET # BLD AUTO: 319 10*3/MM3 (ref 140–450)
PMV BLD AUTO: 9.5 FL (ref 6–12)
RBC # BLD AUTO: 4.39 10*6/MM3 (ref 3.77–5.28)
WBC NRBC COR # BLD AUTO: 7.91 10*3/MM3 (ref 3.4–10.8)

## 2025-06-16 PROCEDURE — 99214 OFFICE O/P EST MOD 30 MIN: CPT | Performed by: INTERNAL MEDICINE

## 2025-06-16 PROCEDURE — 36415 COLL VENOUS BLD VENIPUNCTURE: CPT

## 2025-06-16 PROCEDURE — G0279 TOMOSYNTHESIS, MAMMO: HCPCS

## 2025-06-16 PROCEDURE — 85025 COMPLETE CBC W/AUTO DIFF WBC: CPT

## 2025-06-16 PROCEDURE — 77066 DX MAMMO INCL CAD BI: CPT

## 2025-06-16 NOTE — PROGRESS NOTES
This patient recently completed the CARE risk assessment for a mammogram appointment. Based on the patient's responses, NCCN criteria for genetic testing was met. At the time of the assessment, the patient was provided with both written and video educational materials regarding genetic testing.    Navigator follow-up:   I spoke with the patient regarding the risk assessment results and our genetic testing program.  She declined genetic testing at this time due to having previous testing in 2014 ([employer] which was negative, with a VUS on the APC and BARD-1 gene).  I did encourage her that we do recommend re-testing every 10 years.  She is going to think it over and let me know if she does wish to proceed with re-testing.